# Patient Record
Sex: MALE | Race: WHITE | Employment: OTHER | URBAN - METROPOLITAN AREA
[De-identification: names, ages, dates, MRNs, and addresses within clinical notes are randomized per-mention and may not be internally consistent; named-entity substitution may affect disease eponyms.]

---

## 2018-04-09 ENCOUNTER — HOSPITAL ENCOUNTER (OUTPATIENT)
Dept: MRI IMAGING | Age: 78
Discharge: HOME | End: 2018-04-09
Attending: FAMILY MEDICINE | Admitting: FAMILY MEDICINE

## 2018-04-09 DIAGNOSIS — F19.921: ICD-10-CM

## 2018-04-09 LAB
GFR AFRICAN AMERICAN: 58 ML/MIN/1.73M2
GFR NON-AFRICAN AMERICAN: 48 ML/MIN/1.73M2
POC CREATININE: 1.4 MG/DL (ref 0.9–1.3)

## 2018-08-08 NOTE — ANESTHESIA PRE-OP
Department of Anesthesiology  Preprocedure Note       Name:  Korey Schwarz   Age:  77 y.o.  :  1940                                          MRN:  [id number]         Date:  2018      Surgeon:  Lisa Ramirez    Procedure:  Colonoscopy    Medications prior to admission:   Prior to Admission medications    Medication Sig Start Date End Date Taking? Authorizing Provider   AMITIZA 24 MCG capsule Take 1 capsule by mouth daily 9/15/15   Historical Provider, MD   carbidopa-levodopa-entacapone (STALEVO 200) -200 MG TABS per tablet Take 1 tablet by mouth 4 times daily    Historical Provider, MD   POTASSIUM CHLORIDE PO Take 1 tablet by mouth daily    Historical Provider, MD   MAGNESIUM PO Take 1 tablet by mouth daily    Historical Provider, MD   pregabalin (LYRICA) 100 MG capsule Take 100 mg by mouth 2 times daily     Historical Provider, MD   minocycline (DYNACIN) 100 MG tablet Take 100 mg by mouth daily. Historical Provider, MD   simvastatin (ZOCOR) 20 MG tablet Take 20 mg by mouth nightly. Historical Provider, MD   aspirin 81 MG tablet Take 81 mg by mouth daily. Historical Provider, MD   vitamin B-12 (CYANOCOBALAMIN) 1000 MCG tablet Take 5,000 mcg by mouth 2 times daily     Historical Provider, MD       Current medications:    Current Outpatient Prescriptions   Medication Sig Dispense Refill    AMITIZA 24 MCG capsule Take 1 capsule by mouth daily      carbidopa-levodopa-entacapone (STALEVO 200) -200 MG TABS per tablet Take 1 tablet by mouth 4 times daily      POTASSIUM CHLORIDE PO Take 1 tablet by mouth daily      MAGNESIUM PO Take 1 tablet by mouth daily      pregabalin (LYRICA) 100 MG capsule Take 100 mg by mouth 2 times daily       minocycline (DYNACIN) 100 MG tablet Take 100 mg by mouth daily.  simvastatin (ZOCOR) 20 MG tablet Take 20 mg by mouth nightly.  aspirin 81 MG tablet Take 81 mg by mouth daily.       vitamin B-12 (CYANOCOBALAMIN) 1000 MCG tablet Take 5,000 mcg by mouth 2 Polyps. Endo/Other:    (+) : arthritis:., .                  ROS comment: Scleroderma Abdominal:       Abdomen: soft. Vascular:                                      Anesthesia Plan      general and TIVA     ASA 3     (Pulse Ox on ear for readind because of bad scleraderma of the hands)  Induction: intravenous. Anesthetic plan and risks discussed with patient. Use of blood products discussed with patient whom. Plan discussed with CRNA.                 Emile Bettencourt MD   8/8/2018

## 2018-08-10 ENCOUNTER — HOSPITAL ENCOUNTER (OUTPATIENT)
Dept: SURGERY | Age: 78
Discharge: HOME | End: 2018-08-10
Attending: SPECIALIST | Admitting: SPECIALIST

## 2018-08-10 VITALS
RESPIRATION RATE: 16 BRPM | TEMPERATURE: 97.3 F | SYSTOLIC BLOOD PRESSURE: 154 MMHG | WEIGHT: 172.4 LBS | BODY MASS INDEX: 22.85 KG/M2 | HEART RATE: 67 BPM | HEIGHT: 73 IN | OXYGEN SATURATION: 100 % | DIASTOLIC BLOOD PRESSURE: 92 MMHG

## 2018-08-10 RX ORDER — RIVASTIGMINE TARTRATE 4.5 MG/1
4.5 CAPSULE ORAL 2 TIMES DAILY
COMMUNITY

## 2018-08-10 RX ORDER — SODIUM CHLORIDE, SODIUM LACTATE, POTASSIUM CHLORIDE, CALCIUM CHLORIDE 600; 310; 30; 20 MG/100ML; MG/100ML; MG/100ML; MG/100ML
INJECTION, SOLUTION INTRAVENOUS CONTINUOUS
Status: DISCONTINUED | OUTPATIENT
Start: 2018-08-10 | End: 2018-08-11 | Stop reason: HOSPADM

## 2018-08-10 RX ADMIN — SODIUM CHLORIDE, SODIUM LACTATE, POTASSIUM CHLORIDE, CALCIUM CHLORIDE: 600; 310; 30; 20 INJECTION, SOLUTION INTRAVENOUS at 10:15

## 2018-08-10 ASSESSMENT — PAIN SCALES - GENERAL
PAINLEVEL_OUTOF10: 0
PAINLEVEL_OUTOF10: 0

## 2018-08-10 ASSESSMENT — PAIN - FUNCTIONAL ASSESSMENT: PAIN_FUNCTIONAL_ASSESSMENT: 0-10

## 2018-08-10 NOTE — BRIEF OP NOTE
BRIEF COLONOSCOPY REPORT:    Impression:    1) four 3 mm polyps removed from the ascending colon    2) mild sigmoid divertics   3) internal hemorrhoids        Suggest:   1) If three or more of the resected polyps are adenomas or sessile serrated polyps then follow up colonoscopy in 3 years is suggested   2)  If only 1 or 2 are adenomas or sessile serrated polyps, then follow up in 5 years is suggested   3) If any polyp has a \"villous\" component or high-grade dysplasia, then repeat in 3 years is suggested   4) If all of the polyps are simply hyperplastic then follow up colonoscopy is not needed for 10 years. The complete operative report (including photos) is available in the following locations:   1) soft chart now   2) report will also be scanned and can then be found by going to \"chart review\" then \"notes\" then \"op report\" or by going to \"chart review\" then \"media\" then \"op report\". For review of photos, may need to go to page 2.

## 2018-08-10 NOTE — PROGRESS NOTES
1141-To Pacu, awake, denies any pain, nausea or SOB, family @ bedside, updated on plan of care, call light in reach.   Dr. Ruby Pollack in to update on procedure  1145-Repositioned to semi-fowlers, PO fluids given  1157-Tolerating fluids well, instructions reviewed with pt and family, understanding verbalized  1209-To car per w/c with written instructions,  present

## 2020-08-14 ENCOUNTER — HOSPITAL ENCOUNTER (INPATIENT)
Age: 80
LOS: 15 days | Discharge: HOME HEALTH | DRG: 371 | End: 2020-08-30
Attending: EMERGENCY MEDICINE | Admitting: INTERNAL MEDICINE
Payer: MEDICARE

## 2020-08-14 ENCOUNTER — APPOINTMENT (OUTPATIENT)
Dept: CT IMAGING | Age: 80
DRG: 371 | End: 2020-08-14
Payer: MEDICARE

## 2020-08-14 LAB
ALBUMIN SERPL-MCNC: 3.1 GM/DL (ref 3.4–5)
ALP BLD-CCNC: 98 IU/L (ref 40–129)
ALT SERPL-CCNC: <5 U/L (ref 10–40)
ANION GAP SERPL CALCULATED.3IONS-SCNC: 12 MMOL/L (ref 4–16)
AST SERPL-CCNC: 11 IU/L (ref 15–37)
BASOPHILS ABSOLUTE: 0 K/CU MM
BASOPHILS RELATIVE PERCENT: 0.2 % (ref 0–1)
BILIRUB SERPL-MCNC: 0.4 MG/DL (ref 0–1)
BUN BLDV-MCNC: 21 MG/DL (ref 6–23)
CALCIUM SERPL-MCNC: 9.6 MG/DL (ref 8.3–10.6)
CHLORIDE BLD-SCNC: 98 MMOL/L (ref 99–110)
CO2: 23 MMOL/L (ref 21–32)
CREAT SERPL-MCNC: 1.7 MG/DL (ref 0.9–1.3)
DIFFERENTIAL TYPE: ABNORMAL
EOSINOPHILS ABSOLUTE: 0 K/CU MM
EOSINOPHILS RELATIVE PERCENT: 0.1 % (ref 0–3)
GFR AFRICAN AMERICAN: 47 ML/MIN/1.73M2
GFR NON-AFRICAN AMERICAN: 39 ML/MIN/1.73M2
GLUCOSE BLD-MCNC: 104 MG/DL (ref 70–99)
HCT VFR BLD CALC: 40.2 % (ref 42–52)
HEMOGLOBIN: 12.7 GM/DL (ref 13.5–18)
IMMATURE NEUTROPHIL %: 0.5 % (ref 0–0.43)
LACTATE: 1.3 MMOL/L (ref 0.4–2)
LIPASE: 34 IU/L (ref 13–60)
LYMPHOCYTES ABSOLUTE: 0.5 K/CU MM
LYMPHOCYTES RELATIVE PERCENT: 3.9 % (ref 24–44)
MCH RBC QN AUTO: 30.3 PG (ref 27–31)
MCHC RBC AUTO-ENTMCNC: 31.6 % (ref 32–36)
MCV RBC AUTO: 95.9 FL (ref 78–100)
MONOCYTES ABSOLUTE: 1 K/CU MM
MONOCYTES RELATIVE PERCENT: 8.1 % (ref 0–4)
NUCLEATED RBC %: 0 %
PDW BLD-RTO: 13.5 % (ref 11.7–14.9)
PLATELET # BLD: 233 K/CU MM (ref 140–440)
PMV BLD AUTO: 9.4 FL (ref 7.5–11.1)
POTASSIUM SERPL-SCNC: 4.3 MMOL/L (ref 3.5–5.1)
RBC # BLD: 4.19 M/CU MM (ref 4.6–6.2)
SEGMENTED NEUTROPHILS ABSOLUTE COUNT: 11.2 K/CU MM
SEGMENTED NEUTROPHILS RELATIVE PERCENT: 87.2 % (ref 36–66)
SODIUM BLD-SCNC: 133 MMOL/L (ref 135–145)
TOTAL IMMATURE NEUTOROPHIL: 0.07 K/CU MM
TOTAL NUCLEATED RBC: 0 K/CU MM
TOTAL PROTEIN: 6.8 GM/DL (ref 6.4–8.2)
WBC # BLD: 12.8 K/CU MM (ref 4–10.5)

## 2020-08-14 PROCEDURE — 85025 COMPLETE CBC W/AUTO DIFF WBC: CPT

## 2020-08-14 PROCEDURE — 84154 ASSAY OF PSA FREE: CPT

## 2020-08-14 PROCEDURE — 4500000027

## 2020-08-14 PROCEDURE — 74176 CT ABD & PELVIS W/O CONTRAST: CPT

## 2020-08-14 PROCEDURE — 83690 ASSAY OF LIPASE: CPT

## 2020-08-14 PROCEDURE — 84153 ASSAY OF PSA TOTAL: CPT

## 2020-08-14 PROCEDURE — 80053 COMPREHEN METABOLIC PANEL: CPT

## 2020-08-14 PROCEDURE — 36415 COLL VENOUS BLD VENIPUNCTURE: CPT

## 2020-08-14 PROCEDURE — 83605 ASSAY OF LACTIC ACID: CPT

## 2020-08-14 PROCEDURE — 99285 EMERGENCY DEPT VISIT HI MDM: CPT

## 2020-08-14 PROCEDURE — 2580000003 HC RX 258: Performed by: EMERGENCY MEDICINE

## 2020-08-14 PROCEDURE — 6360000002 HC RX W HCPCS

## 2020-08-14 RX ORDER — 0.9 % SODIUM CHLORIDE 0.9 %
1000 INTRAVENOUS SOLUTION INTRAVENOUS ONCE
Status: COMPLETED | OUTPATIENT
Start: 2020-08-14 | End: 2020-08-14

## 2020-08-14 RX ORDER — ONDANSETRON 2 MG/ML
4 INJECTION INTRAMUSCULAR; INTRAVENOUS EVERY 30 MIN PRN
Status: DISCONTINUED | OUTPATIENT
Start: 2020-08-14 | End: 2020-08-15

## 2020-08-14 RX ADMIN — SODIUM CHLORIDE 1000 ML: 9 INJECTION, SOLUTION INTRAVENOUS at 21:30

## 2020-08-14 NOTE — ED PROVIDER NOTES
As physician-in-triage, I performed a medical screening history and physical exam on this patient. HISTORY OF PRESENT ILLNESS  Mario Dewitt is a 79 y.o. male presents with nausea, vomiting and abdominal pain. No bowel movement for a couple of days. Also having difficulty urinating. Was sent to the ER for possible urinary tract infection versus bowel obstruction. PHYSICAL EXAM  BP (!) 148/86   Pulse 80   Temp 98.9 °F (37.2 °C) (Oral)   Resp 16   Ht 6' 1\" (1.854 m)   Wt 161 lb (73 kg)   SpO2 97%   BMI 21.24 kg/m²     On exam, the patient appears in no acute distress. Speech is clear. Breathing is unlabored. Moves all extremities    Comment: Please note this report has been produced using speech recognition software and may contain errors related to that system including errors in grammar, punctuation, and spelling, as well as words and phrases that may be inappropriate. If there are any questions or concerns please feel free to contact the dictating provider for clarification.         Harjeet Thomas MD  08/14/20 4103

## 2020-08-15 ENCOUNTER — APPOINTMENT (OUTPATIENT)
Dept: CT IMAGING | Age: 80
DRG: 371 | End: 2020-08-15
Payer: MEDICARE

## 2020-08-15 PROBLEM — R19.00 PELVIC MASS: Status: ACTIVE | Noted: 2020-08-15

## 2020-08-15 LAB
AMMONIA: 22 UMOL/L (ref 16–60)
ANION GAP SERPL CALCULATED.3IONS-SCNC: 10 MMOL/L (ref 4–16)
BACTERIA: NEGATIVE /HPF
BACTERIA: NEGATIVE /HPF
BASOPHILS ABSOLUTE: 0 K/CU MM
BASOPHILS RELATIVE PERCENT: 0.2 % (ref 0–1)
BILIRUBIN URINE: NEGATIVE MG/DL
BILIRUBIN URINE: NEGATIVE MG/DL
BLOOD, URINE: ABNORMAL
BLOOD, URINE: ABNORMAL
BUN BLDV-MCNC: 23 MG/DL (ref 6–23)
CALCIUM SERPL-MCNC: 9.3 MG/DL (ref 8.3–10.6)
CHLORIDE BLD-SCNC: 101 MMOL/L (ref 99–110)
CHLORIDE URINE RANDOM: 115 MMOL/L (ref 43–210)
CLARITY: ABNORMAL
CLARITY: CLEAR
CO2: 24 MMOL/L (ref 21–32)
COLOR: ABNORMAL
COLOR: YELLOW
CREAT SERPL-MCNC: 2 MG/DL (ref 0.9–1.3)
CREATININE URINE: 85.6 MG/DL (ref 39–259)
DIFFERENTIAL TYPE: ABNORMAL
EOSINOPHILS ABSOLUTE: 0 K/CU MM
EOSINOPHILS RELATIVE PERCENT: 0.1 % (ref 0–3)
GFR AFRICAN AMERICAN: 39 ML/MIN/1.73M2
GFR NON-AFRICAN AMERICAN: 32 ML/MIN/1.73M2
GLUCOSE BLD-MCNC: 88 MG/DL (ref 70–99)
GLUCOSE, URINE: NEGATIVE MG/DL
GLUCOSE, URINE: NEGATIVE MG/DL
HCT VFR BLD CALC: 38.9 % (ref 42–52)
HEMOGLOBIN: 12.4 GM/DL (ref 13.5–18)
IMMATURE NEUTROPHIL %: 0.5 % (ref 0–0.43)
INR BLD: 1.11 INDEX
KETONES, URINE: ABNORMAL MG/DL
KETONES, URINE: ABNORMAL MG/DL
LEUKOCYTE ESTERASE, URINE: NEGATIVE
LEUKOCYTE ESTERASE, URINE: NEGATIVE
LYMPHOCYTES ABSOLUTE: 0.4 K/CU MM
LYMPHOCYTES RELATIVE PERCENT: 3.7 % (ref 24–44)
MCH RBC QN AUTO: 30.2 PG (ref 27–31)
MCHC RBC AUTO-ENTMCNC: 31.9 % (ref 32–36)
MCV RBC AUTO: 94.9 FL (ref 78–100)
MONOCYTES ABSOLUTE: 1 K/CU MM
MONOCYTES RELATIVE PERCENT: 8.3 % (ref 0–4)
MUCUS: ABNORMAL HPF
MUCUS: ABNORMAL HPF
NITRITE URINE, QUANTITATIVE: NEGATIVE
NITRITE URINE, QUANTITATIVE: NEGATIVE
NUCLEATED RBC %: 0 %
PDW BLD-RTO: 13.4 % (ref 11.7–14.9)
PH, URINE: 5 (ref 5–8)
PH, URINE: 6 (ref 5–8)
PLATELET # BLD: 229 K/CU MM (ref 140–440)
PMV BLD AUTO: 10 FL (ref 7.5–11.1)
POTASSIUM SERPL-SCNC: 4.3 MMOL/L (ref 3.5–5.1)
POTASSIUM, UR: 26.9 MMOL/L (ref 22–119)
PROSTATE SPECIFIC ANTIGEN: 2.76 NG/ML (ref 0–4)
PROT/CREAT RATIO, UR: 0.7
PROTEIN UA: 100 MG/DL
PROTEIN UA: 30 MG/DL
PROTHROMBIN TIME: 13.4 SECONDS (ref 11.7–14.5)
RBC # BLD: 4.1 M/CU MM (ref 4.6–6.2)
RBC URINE: 4 /HPF (ref 0–3)
RBC URINE: 6 /HPF (ref 0–3)
SEGMENTED NEUTROPHILS ABSOLUTE COUNT: 10.4 K/CU MM
SEGMENTED NEUTROPHILS RELATIVE PERCENT: 87.2 % (ref 36–66)
SODIUM BLD-SCNC: 135 MMOL/L (ref 135–145)
SODIUM URINE: 122 MMOL/L (ref 35–167)
SPECIFIC GRAVITY UA: 1.01 (ref 1–1.03)
SPECIFIC GRAVITY UA: 1.02 (ref 1–1.03)
TOTAL IMMATURE NEUTOROPHIL: 0.06 K/CU MM
TOTAL NUCLEATED RBC: 0 K/CU MM
TRANSITIONAL EPITHELIAL: <1 /HPF
TRICHOMONAS: ABNORMAL /HPF
TRICHOMONAS: ABNORMAL /HPF
URINE TOTAL PROTEIN: 63.2 MG/DL
UROBILINOGEN, URINE: NORMAL MG/DL (ref 0.2–1)
UROBILINOGEN, URINE: NORMAL MG/DL (ref 0.2–1)
WBC # BLD: 12 K/CU MM (ref 4–10.5)
WBC UA: 1 /HPF (ref 0–2)
WBC UA: 4 /HPF (ref 0–2)

## 2020-08-15 PROCEDURE — 6360000002 HC RX W HCPCS: Performed by: NURSE PRACTITIONER

## 2020-08-15 PROCEDURE — 81001 URINALYSIS AUTO W/SCOPE: CPT

## 2020-08-15 PROCEDURE — 92610 EVALUATE SWALLOWING FUNCTION: CPT

## 2020-08-15 PROCEDURE — 6360000002 HC RX W HCPCS: Performed by: INTERNAL MEDICINE

## 2020-08-15 PROCEDURE — 36415 COLL VENOUS BLD VENIPUNCTURE: CPT

## 2020-08-15 PROCEDURE — 2700000000 HC OXYGEN THERAPY PER DAY

## 2020-08-15 PROCEDURE — 6370000000 HC RX 637 (ALT 250 FOR IP): Performed by: INTERNAL MEDICINE

## 2020-08-15 PROCEDURE — 82570 ASSAY OF URINE CREATININE: CPT

## 2020-08-15 PROCEDURE — 2580000003 HC RX 258: Performed by: HOSPITALIST

## 2020-08-15 PROCEDURE — 87086 URINE CULTURE/COLONY COUNT: CPT

## 2020-08-15 PROCEDURE — 94761 N-INVAS EAR/PLS OXIMETRY MLT: CPT

## 2020-08-15 PROCEDURE — 2580000003 HC RX 258: Performed by: INTERNAL MEDICINE

## 2020-08-15 PROCEDURE — 84156 ASSAY OF PROTEIN URINE: CPT

## 2020-08-15 PROCEDURE — 85025 COMPLETE CBC W/AUTO DIFF WBC: CPT

## 2020-08-15 PROCEDURE — 85610 PROTHROMBIN TIME: CPT

## 2020-08-15 PROCEDURE — G0103 PSA SCREENING: HCPCS

## 2020-08-15 PROCEDURE — 84133 ASSAY OF URINE POTASSIUM: CPT

## 2020-08-15 PROCEDURE — 84300 ASSAY OF URINE SODIUM: CPT

## 2020-08-15 PROCEDURE — 51702 INSERT TEMP BLADDER CATH: CPT

## 2020-08-15 PROCEDURE — 6360000002 HC RX W HCPCS: Performed by: HOSPITALIST

## 2020-08-15 PROCEDURE — 1200000000 HC SEMI PRIVATE

## 2020-08-15 PROCEDURE — 82140 ASSAY OF AMMONIA: CPT

## 2020-08-15 PROCEDURE — 70450 CT HEAD/BRAIN W/O DYE: CPT

## 2020-08-15 PROCEDURE — 80048 BASIC METABOLIC PNL TOTAL CA: CPT

## 2020-08-15 PROCEDURE — 99221 1ST HOSP IP/OBS SF/LOW 40: CPT | Performed by: SURGERY

## 2020-08-15 PROCEDURE — 82436 ASSAY OF URINE CHLORIDE: CPT

## 2020-08-15 RX ORDER — CARBIDOPA, LEVODOPA AND ENTACAPONE 50; 200; 200 MG/1; MG/1; MG/1
1 TABLET, FILM COATED ORAL 4 TIMES DAILY
Status: DISCONTINUED | OUTPATIENT
Start: 2020-08-15 | End: 2020-08-15 | Stop reason: CLARIF

## 2020-08-15 RX ORDER — ROSUVASTATIN CALCIUM 40 MG/1
40 TABLET, COATED ORAL EVERY MORNING
Status: DISCONTINUED | OUTPATIENT
Start: 2020-08-15 | End: 2020-08-30 | Stop reason: HOSPADM

## 2020-08-15 RX ORDER — PROMETHAZINE HYDROCHLORIDE 12.5 MG/1
12.5 TABLET ORAL EVERY 6 HOURS PRN
Status: DISCONTINUED | OUTPATIENT
Start: 2020-08-15 | End: 2020-08-30 | Stop reason: HOSPADM

## 2020-08-15 RX ORDER — POLYETHYLENE GLYCOL 3350 17 G/17G
17 POWDER, FOR SOLUTION ORAL DAILY PRN
Status: DISCONTINUED | OUTPATIENT
Start: 2020-08-15 | End: 2020-08-30 | Stop reason: HOSPADM

## 2020-08-15 RX ORDER — ESCITALOPRAM OXALATE 10 MG/1
10 TABLET ORAL NIGHTLY
Status: DISCONTINUED | OUTPATIENT
Start: 2020-08-15 | End: 2020-08-30 | Stop reason: HOSPADM

## 2020-08-15 RX ORDER — SODIUM CHLORIDE 0.9 % (FLUSH) 0.9 %
10 SYRINGE (ML) INJECTION PRN
Status: DISCONTINUED | OUTPATIENT
Start: 2020-08-15 | End: 2020-08-30 | Stop reason: HOSPADM

## 2020-08-15 RX ORDER — ONDANSETRON 2 MG/ML
4 INJECTION INTRAMUSCULAR; INTRAVENOUS EVERY 6 HOURS PRN
Status: DISCONTINUED | OUTPATIENT
Start: 2020-08-15 | End: 2020-08-30 | Stop reason: HOSPADM

## 2020-08-15 RX ORDER — ASPIRIN 81 MG/1
81 TABLET, CHEWABLE ORAL DAILY
Status: DISCONTINUED | OUTPATIENT
Start: 2020-08-15 | End: 2020-08-30

## 2020-08-15 RX ORDER — ACETAMINOPHEN 650 MG/1
650 SUPPOSITORY RECTAL EVERY 6 HOURS PRN
Status: DISCONTINUED | OUTPATIENT
Start: 2020-08-15 | End: 2020-08-30 | Stop reason: HOSPADM

## 2020-08-15 RX ORDER — BICALUTAMIDE 50 MG/1
50 TABLET, FILM COATED ORAL DAILY
COMMUNITY

## 2020-08-15 RX ORDER — ENTACAPONE 200 MG/1
200 TABLET ORAL 4 TIMES DAILY
Status: DISCONTINUED | OUTPATIENT
Start: 2020-08-15 | End: 2020-08-30 | Stop reason: HOSPADM

## 2020-08-15 RX ORDER — AMANTADINE HYDROCHLORIDE 100 MG/1
100 CAPSULE, GELATIN COATED ORAL 2 TIMES DAILY
Status: DISCONTINUED | OUTPATIENT
Start: 2020-08-15 | End: 2020-08-30 | Stop reason: HOSPADM

## 2020-08-15 RX ORDER — SODIUM CHLORIDE 0.9 % (FLUSH) 0.9 %
10 SYRINGE (ML) INJECTION EVERY 12 HOURS SCHEDULED
Status: DISCONTINUED | OUTPATIENT
Start: 2020-08-15 | End: 2020-08-30 | Stop reason: HOSPADM

## 2020-08-15 RX ORDER — RIVASTIGMINE TARTRATE 1.5 MG/1
1.5 CAPSULE ORAL 2 TIMES DAILY
Status: DISCONTINUED | OUTPATIENT
Start: 2020-08-15 | End: 2020-08-30 | Stop reason: HOSPADM

## 2020-08-15 RX ORDER — MORPHINE SULFATE 2 MG/ML
2 INJECTION, SOLUTION INTRAMUSCULAR; INTRAVENOUS EVERY 4 HOURS PRN
Status: DISCONTINUED | OUTPATIENT
Start: 2020-08-15 | End: 2020-08-30 | Stop reason: HOSPADM

## 2020-08-15 RX ORDER — BICALUTAMIDE 50 MG/1
50 TABLET, FILM COATED ORAL DAILY
Status: DISCONTINUED | OUTPATIENT
Start: 2020-08-15 | End: 2020-08-30 | Stop reason: HOSPADM

## 2020-08-15 RX ORDER — ACETAMINOPHEN 325 MG/1
650 TABLET ORAL EVERY 6 HOURS PRN
Status: DISCONTINUED | OUTPATIENT
Start: 2020-08-15 | End: 2020-08-30 | Stop reason: HOSPADM

## 2020-08-15 RX ORDER — SODIUM CHLORIDE 9 MG/ML
INJECTION, SOLUTION INTRAVENOUS CONTINUOUS
Status: DISCONTINUED | OUTPATIENT
Start: 2020-08-15 | End: 2020-08-21

## 2020-08-15 RX ADMIN — ACETAMINOPHEN 650 MG: 325 TABLET ORAL at 16:00

## 2020-08-15 RX ADMIN — CARBIDOPA AND LEVODOPA 2 TABLET: 25; 100 TABLET ORAL at 21:06

## 2020-08-15 RX ADMIN — CARBIDOPA AND LEVODOPA 2 TABLET: 25; 100 TABLET ORAL at 10:07

## 2020-08-15 RX ADMIN — RIVASTIGMINE TARTRATE 1.5 MG: 1.5 CAPSULE ORAL at 21:09

## 2020-08-15 RX ADMIN — ENTACAPONE 200 MG: 200 TABLET, FILM COATED ORAL at 13:04

## 2020-08-15 RX ADMIN — SODIUM CHLORIDE: 9 INJECTION, SOLUTION INTRAVENOUS at 04:44

## 2020-08-15 RX ADMIN — CARBIDOPA AND LEVODOPA 2 TABLET: 25; 100 TABLET ORAL at 13:02

## 2020-08-15 RX ADMIN — RIVASTIGMINE TARTRATE 1.5 MG: 1.5 CAPSULE ORAL at 09:55

## 2020-08-15 RX ADMIN — ENTACAPONE 200 MG: 200 TABLET, FILM COATED ORAL at 17:07

## 2020-08-15 RX ADMIN — SODIUM CHLORIDE, PRESERVATIVE FREE 10 ML: 5 INJECTION INTRAVENOUS at 09:56

## 2020-08-15 RX ADMIN — ENTACAPONE 200 MG: 200 TABLET, FILM COATED ORAL at 09:55

## 2020-08-15 RX ADMIN — ESCITALOPRAM OXALATE 10 MG: 10 TABLET ORAL at 21:09

## 2020-08-15 RX ADMIN — ROSUVASTATIN 40 MG: 40 TABLET, FILM COATED ORAL at 10:07

## 2020-08-15 RX ADMIN — ENTACAPONE 200 MG: 200 TABLET, FILM COATED ORAL at 21:09

## 2020-08-15 RX ADMIN — AZITHROMYCIN MONOHYDRATE 500 MG: 500 INJECTION, POWDER, LYOPHILIZED, FOR SOLUTION INTRAVENOUS at 20:14

## 2020-08-15 RX ADMIN — AMANTADINE HYDROCHLORIDE 100 MG: 100 CAPSULE, LIQUID FILLED ORAL at 21:06

## 2020-08-15 RX ADMIN — AMANTADINE HYDROCHLORIDE 100 MG: 100 CAPSULE, LIQUID FILLED ORAL at 10:07

## 2020-08-15 RX ADMIN — ENOXAPARIN SODIUM 30 MG: 30 INJECTION SUBCUTANEOUS at 09:54

## 2020-08-15 RX ADMIN — CEFTRIAXONE 1 G: 1 INJECTION, POWDER, FOR SOLUTION INTRAMUSCULAR; INTRAVENOUS at 16:13

## 2020-08-15 RX ADMIN — ACETAMINOPHEN 650 MG: 650 SUPPOSITORY RECTAL at 23:11

## 2020-08-15 RX ADMIN — CARBIDOPA AND LEVODOPA 2 TABLET: 25; 100 TABLET ORAL at 17:07

## 2020-08-15 RX ADMIN — ASPIRIN 81 MG CHEWABLE TABLET 81 MG: 81 TABLET CHEWABLE at 10:07

## 2020-08-15 RX ADMIN — BICALUTAMIDE 50 MG: 50 TABLET ORAL at 09:55

## 2020-08-15 RX ADMIN — MORPHINE SULFATE 2 MG: 2 INJECTION, SOLUTION INTRAMUSCULAR; INTRAVENOUS at 05:20

## 2020-08-15 RX ADMIN — SODIUM CHLORIDE: 9 INJECTION, SOLUTION INTRAVENOUS at 23:11

## 2020-08-15 ASSESSMENT — PAIN DESCRIPTION - FREQUENCY: FREQUENCY: CONTINUOUS

## 2020-08-15 ASSESSMENT — PAIN SCALES - GENERAL
PAINLEVEL_OUTOF10: 0
PAINLEVEL_OUTOF10: 9
PAINLEVEL_OUTOF10: 9

## 2020-08-15 ASSESSMENT — PAIN DESCRIPTION - PAIN TYPE: TYPE: ACUTE PAIN

## 2020-08-15 ASSESSMENT — PAIN DESCRIPTION - DESCRIPTORS: DESCRIPTORS: CRAMPING;SPASM

## 2020-08-15 ASSESSMENT — PAIN DESCRIPTION - LOCATION: LOCATION: LEG

## 2020-08-15 NOTE — PROGRESS NOTES
PS sent to Dr. Lisa Wilkins \"Pt wife, concerned with how lethargic pt is. Are you able to come assess pt/update wife or call room phone [phone]. Thanks! \"  RINA PinedoN, RN

## 2020-08-15 NOTE — PROGRESS NOTES
mass  Resolved Problems:    * No resolved hospital problems. *    Blood pressure (!) 161/85, pulse 89, temperature 98.6 °F (37 °C), temperature source Oral, resp. rate 17, height 6' 1\" (1.854 m), weight 161 lb (73 kg), SpO2 98 %. Subjective:  Diet:  Poor intake. Pain:  He complains of pain that is mild. Objective:  General Appearance:  Comfortable. Vital signs: (most recent): Blood pressure (!) 161/85, pulse 89, temperature 98.6 °F (37 °C), temperature source Oral, resp. rate 17, height 6' 1\" (1.854 m), weight 161 lb (73 kg), SpO2 98 %. Vital signs are normal.    HEENT: Normal HEENT exam.  (Tanned face compared to torso)    Lungs:  Normal effort. Heart: Normal rate. Abdomen: Abdomen is soft. (No peritoneal signs. BS+). Extremities: Decreased range of motion. Neurological: Patient is alert. Pupils:  Pupils are equal, round, and reactive to light. Skin:  Warm.       Assessment & Plan  abd apin due to pelvic mass with right sided hydronephrosis  -surg and urology eval  -Ct with right pelvic mass   SLIM  - IVF  -has hydronephrosis and consult nephro  Mod PCM  Prostate cancer /sp prostatectomy   -bicalutamide  Dementia parkisons  -excelon, amantidine, sinemet, entapacone  DVT prophyalxis  -lovenox    Lisa Guthrie MD  8/15/2020

## 2020-08-15 NOTE — H&P
- Renee catheter is not present. MS  -B/L extremities strong muscles strength. Full movements. No gross joint deformities. No swelling, intact sensation symmetrical.   SKIN  -Normal coloration, warm, dry. No open wounds or ulcers. NEURO  -normal speech, no lateralizing weakness. [facility]  -Awake, alert, oriented x 3    Past Medical History:      Past Medical History:   Diagnosis Date    Arthritis     bilateral hands    Cancer ([address]     prostate    Hyperlipidemia     Scleroderma ([address]     face skin is black in coloration     Past Surgery History:  Patient  has a past surgical history that includes Prostatectomy (2011); Endoscopy, colon, diagnostic (10/6/2015); Colonoscopy (3/8/13); Colonoscopy (10/6/2015); and Colonoscopy (08/10/2018).   Social History:    FAM HX: Assessed: Noncontributory  Soc HX:   Social History     Socioeconomic History    Marital status:      Spouse name: None    Number of children: None    Years of education: None    Highest education level: None   Occupational History    None   Social Needs    Financial resource strain: None    Food insecurity     Worry: None     Inability: None    Transportation needs     Medical: None     Non-medical: None   Tobacco Use    Smoking status: Never Smoker    Smokeless tobacco: Never Used   Substance and Sexual Activity    Alcohol use: Yes     Comment: rarely    Drug use: No    Sexual activity: None   Lifestyle    Physical activity     Days per week: None     Minutes per session: None    Stress: None   Relationships    Social connections     Talks on phone: None     Gets together: None     Attends Voodoo service: None     Active member of club or organization: None     Attends meetings of clubs or organizations: None     Relationship status: None    Intimate partner violence     Fear of current or ex partner: None     Emotionally abused: None     Physically abused: None     Forced sexual activity: None   Other Topics Concern    None TROPONIN1  Invalid input(s): PRO-BNP    Radiology this visit:  Reviewed. Ct Abdomen Pelvis Wo Contrast Additional Contrast? None    Result Date: 8/14/2020  EXAMINATION: CT OF THE ABDOMEN AND PELVIS WITHOUT CONTRAST 8/14/2020 9:52 pm TECHNIQUE: CT of the abdomen and pelvis was performed without the administration of intravenous contrast. Multiplanar reformatted images are provided for review. Dose modulation, iterative reconstruction, and/or weight based adjustment of the mA/kV was utilized to reduce the radiation dose to as low as reasonably achievable. COMPARISON: None. HISTORY: ORDERING SYSTEM PROVIDED HISTORY: nausea, vomiting, abdominal pain TECHNOLOGIST PROVIDED HISTORY: Reason for exam:->nausea, vomiting, abdominal pain Additional Contrast?->None Reason for Exam: nausea, vomiting, abdominal pain Acuity: Acute Type of Exam: Initial FINDINGS: Lower Chest: There is dependent consolidation in the lungs. Organs: There is mild right hydronephrosis. There is no obstructing stone. No acute abnormality of the liver, spleen, pancreas, adrenals, gallbladder, or left kidney. GI/Bowel: Bowel caliber is normal.  There is no evidence of active bowel inflammation. There is no evidence of acute appendicitis. Pelvis: In the right pelvic sidewall is a 56 x 67 x 66 mm (approximately) ill-defined mass adjacent to the iliac vessels. Borders of the mass are ill-defined. It medially displaces the right ureter. The mass appears somewhat elongated along the course of the iliac vein. The mass has an average density of 35 Hounsfield units. The urinary bladder is unremarkable. There are clips from prostatectomy. Peritoneum/Retroperitoneum: See above. No free air or free fluid. Bones/Soft Tissues: No acute osseous abnormality. Right pelvic mass. Differential diagnosis includes subacute hematoma or infiltrative malignancy. Mild right hydronephrosis due to compression of the ureter by the mass.  Bibasilar pulmonary consolidation or atelectasis. EKG this visit:   EKG: None.     Current Treatment Team:  Treatment Team: Attending Provider: Oh Gregg MD; Consulting Physician: MD Francisco Arango Md, MS [facility]  8/15/2020 12:36 AM      Electronically signed by Francisco Montiel MD on 8/15/2020 at 12:36 AM

## 2020-08-15 NOTE — CONSULTS
Nephrology Service Consultation    Patient:  Niko Roberts  MRN: [id number]  Consulting physician:  Wei Prasad MD  Reason for Consult: arf in setting new mass and hydronephrosis    History Obtained From:  patient, electronic medical record  PCP: Xiomara Irving MD    HISTORY OF PRESENT ILLNESS:   The patient is a 79 y.o. male who presents with weakness and state home with family. Pt with prostate ca nd surgery 2010, parkinson disease, scleroderma , malnourished and poor historian. Present with abd pain and no BM with low uop. Ct scan in er done with right pelvic mass and concern malignancy but also was compressing ureter leading to hydro and now noted arf in above setting. Renal asked evaluate. Past Medical History:        Diagnosis Date    Arthritis     bilateral hands    Cancer ([address]     prostate    Hyperlipidemia     [address]     face skin is black in coloration       Past Surgical History:        Procedure Laterality Date    COLONOSCOPY  3/8/13    Insuffieient prep    COLONOSCOPY  10/6/2015    diverticulosis, polyps x8, ext hem,    COLONOSCOPY  08/10/2018    Polyps x5, Int hemorrhoids, diverticulosis    ENDOSCOPY, COLON, DIAGNOSTIC  10/6/2015    mild erosive esophagitis    PROSTATECTOMY  2011       Medications:   Scheduled Meds:   amantadine  100 mg Oral BID    aspirin  81 mg Oral Daily    escitalopram  10 mg Oral Nightly    rivastigmine  1.5 mg Oral BID    rosuvastatin  40 mg Oral QAM    bicalutamide  50 mg Oral Daily    sodium chloride flush  10 mL Intravenous 2 times per day    enoxaparin  30 mg Subcutaneous Daily    carbidopa-levodopa  2 tablet Oral 4x Daily    And    entacapone  200 mg Oral 4x Daily     Continuous Infusions:   sodium chloride 100 mL/hr at 08/15/20 0444     PRN Meds:.sodium chloride flush, acetaminophen **OR** acetaminophen, polyethylene glycol, promethazine **OR** ondansetron, morphine    Allergies:  Patient has no known allergies.     Social History:   TOBACCO:   reports that he has never smoked. He has never used smokeless tobacco.  ETOH:   reports current alcohol use. OCCUPATION:      Family History:   History reviewed. No pertinent family history. REVIEW OF SYSTEMS:  Negative except for weak soft spoken poor historian weak with constipation. Physical Exam:    Vitals: BP (!) 161/85   Pulse 89   Temp 98.6 °F (37 °C) (Oral)   Resp 17   Ht 6' 1\" (1.854 m)   Wt 161 lb (73 kg)   SpO2 98%   BMI 21.24 kg/m²   General appearance: awake weak  HEENT: Head: Normal, normocephalic, atraumatic. Neck: supple, symmetrical, trachea midline  Lungs: diminished breath sounds bilaterally  Heart: S1, S2 normal  Abdomen: abnormal findings:  hypoactive bowel sounds  Extremities: edema  none  Neurologic: Mental status: alertness: awake    CBC:   Recent Labs     08/14/20 1940   WBC 12.8*   HGB 12.7*        BMP:    Recent Labs     08/14/20  1940 08/15/20  0505   * 135   K 4.3 4.3   CL 98* 101   CO2 23 24   BUN 21 23   CREATININE 1.7* 2.0*   GLUCOSE 104* 88     Hepatic:   Recent Labs     08/14/20 1940   AST 11*   ALT <5*   BILITOT 0.4   ALKPHOS 98     Troponin: No results for input(s): TROPONINI in the last 72 hours. Mg, Phos: No results for input(s): MG, PHOS in the last 72 hours. ABGs: No results found for: PHART, PO2ART, JFN2WAY  INR: No results for input(s): INR in the last 72 hours.   -----------------------------------------------------------------      Assessment and Recommendations     Patient Active Problem List   Diagnosis Code    Weight loss, unintentional R63.4    History of colon polyps Z86.010    Pelvic mass R19.00     Imp/plan  1 arf from atn/pra/hydro  2 right side mass possible malignancy  3 hx prostate ca  4 hyponatremia  5 protein malnutrtion with constipation  6 hx scleroderma    Plan  1 creat 1.12 10/2019 and now with arf and try hydrate and fu urology plan for hydropnephrosis  2 fu oncology eval and rec and may need bx if truly cancer  3 prior surgery fu psa  4 na better  5 start nepro and give laxative  6 check sso and not felt renal related to scleroderma, fu ua as well  Will follow    Electronically signed by Tristen Cali MD on 8/15/2020 at 11:56 AM

## 2020-08-15 NOTE — PROGRESS NOTES
Talked to wife and he has been having issues eating and concern for aspiraiton. Add abx and speech eval. Check CBC.

## 2020-08-15 NOTE — CONSULTS
[facility] SweetetsuyckersJohn[facility] Hospital[facility], [address]   Consult Note  [facility] 1 2 3 4 5    Date: 8/15/2020   Patient: Xiomara Villela   : 1940   DOA: 2020   MRN: [id number]   ROOM#: [id number]     Reason for Consult: Pelvic mass, rt hydronephrosis   Requesting Physician:  Dr. Makayla Mckenna  Collaborating Urologist on Call at time of admission: Dr. Dean Mcgregor:  Abdominal pain    History Obtained From:  patient, electronic medical record    HISTORY OF PRESENT ILLNESS:                The patient is a 79 y.o. male with significant past medical history of prostate cancer s/p RALP 2010, Parkinson's Dz, arthritis, scleroderma, and HLD who presented with abdominal pain, constipation, and decreased urination. Pt states his abdominal pain andconstipation have been ongoing for 2-3 months. Denies gross hematuria, dysuria, or other urinary sx. Pt last seen in our office 2020 by Dr. Oleg Mclaughlin. At that time he had been having ~3 months of significant weakness and having issues with urge incontinence (partially associated with decreased mobility). He is on Eligard. DEWEY in 2017 revealed a band of scar tissue d/t prior RALP but no overt masses    ED Provider Note 20: Xiomara Villela is a 79 y.o. male who presents to the Emergency Department for evaluation of abdominal pain, constipation and decreased urination. The patient states that he has been having intermittent lower abdominal pain. It is crampy and achy in nature. It has been coming and going. There are no exacerbating or relieving factors. He has been constipated and has not had a bowel movement for the past 2 days. He has only urinated once today, but denies the urge to urinate despite his wife giving him fluids. He was seen at his primary care physician's office today who sent him into the emergency department for evaluation of possible bowel obstruction. He denies any nausea or vomiting.  The patient denies fevers, chills, hematemesis, bloody stools, flank pain, tobacco.  ETOH:   reports current alcohol use. DRUGS:   reports no history of drug use. Family History:   History reviewed. No pertinent family history. REVIEW OF SYSTEMS:     CONSTITUTIONAL:  positive for  fatigue  RESPIRATORY:  negative  CARDIOVASCULAR:  negative  GASTROINTESTINAL:  positive for constipation and abdominal pain  GENITOURINARY:  positive for decreased urine output    PHYSICAL EXAM:      VITALS:  BP (!) 193/91   Pulse 82   Temp 97.8 °F (36.6 °C) (Oral)   Resp 16   Ht 6' 1\" (1.854 m)   Wt 161 lb (73 kg)   SpO2 96%   BMI 21.24 kg/m²      TEMPERATURE:  Current - Temp: 97.8 °F (36.6 °C); Max - Temp  Av.4 °F (36.9 °C)  Min: 97.8 °F (36.6 °C)  Max: 98.9 °F (37.2 °C)  24HR BLOOD PRESSURE RANGE:  Systolic (78JLC), LGO:829 , Min:127 , GET:885   ; Diastolic (09JMF), TJF:21, Min:81, Max:92      General appearance: alert, appears stated age, cooperative, fatigued and no distress  Head: Normocephalic, without obvious abnormality, atraumatic  Back: Mild left CVA tenderness  Abdomen: Soft, non-distended, TTP in RLQ    DATA:    WBC:    Lab Results   Component Value Date    WBC 12.8 2020     Hemoglobin/Hematocrit:    Lab Results   Component Value Date    HGB 12.7 2020    HCT 40.2 2020     BMP:    Lab Results   Component Value Date     08/15/2020    K 4.3 08/15/2020     08/15/2020    CO2 24 08/15/2020    BUN 23 08/15/2020    LABALBU 3.1 2020    CREATININE 2.0 08/15/2020    CALCIUM 9.3 08/15/2020    GFRAA 39 08/15/2020    LABGLOM 32 08/15/2020     Imaging:  Ct Abdomen Pelvis Wo Contrast Additional Contrast? None    Result Date: 2020  EXAMINATION: CT OF THE ABDOMEN AND PELVIS WITHOUT CONTRAST 2020 9:52 pm TECHNIQUE: CT of the abdomen and pelvis was performed without the administration of intravenous contrast. Multiplanar reformatted images are provided for review.  Dose modulation, iterative reconstruction, and/or weight based adjustment of the mA/kV was utilized to reduce the radiation dose to as low as reasonably achievable. COMPARISON: None. HISTORY: ORDERING SYSTEM PROVIDED HISTORY: nausea, vomiting, abdominal pain TECHNOLOGIST PROVIDED HISTORY: Reason for exam:->nausea, vomiting, abdominal pain Additional Contrast?->None Reason for Exam: nausea, vomiting, abdominal pain Acuity: Acute Type of Exam: Initial FINDINGS: Lower Chest: There is dependent consolidation in the lungs. Organs: There is mild right hydronephrosis. There is no obstructing stone. No acute abnormality of the liver, spleen, pancreas, adrenals, gallbladder, or left kidney. GI/Bowel: Bowel caliber is normal.  There is no evidence of active bowel inflammation. There is no evidence of acute appendicitis. Pelvis: In the right pelvic sidewall is a 56 x 67 x 66 mm (approximately) ill-defined mass adjacent to the iliac vessels. Borders of the mass are ill-defined. It medially displaces the right ureter. The mass appears somewhat elongated along the course of the iliac vein. The mass has an average density of 35 Hounsfield units. The urinary bladder is unremarkable. There are clips from prostatectomy. Peritoneum/Retroperitoneum: See above. No free air or free fluid. Bones/Soft Tissues: No acute osseous abnormality. Right pelvic mass. Differential diagnosis includes subacute hematoma or infiltrative malignancy. Mild right hydronephrosis due to compression of the ureter by the mass. Bibasilar pulmonary consolidation or atelectasis. Assessment & Plan:      Garima Sheridan is a 79y.o. year old male admitted 8/14/2020 for pelvic mass. H/o prostate cancer s/p RALP 1/2010 at [facility]. Pathology report = [place] 3+4 with negative margins. Pt on Eligard. PSA: 2 3/19, 2.9 6/19    1) Pelvic Mass   8/14/20 CT a/p: Right pelvic mass measuring 56 x 67 x 66 mm, ill-defined mass adjacent to the iliac vessels that medially displaces the right ureter.  The mass has an average density of 35

## 2020-08-15 NOTE — ED NOTES
Bladder scan showed 9 ml of fluid in bladder, Dr Adam Miguel notified.      Devang Salinas, RN  08/14/20 2042

## 2020-08-15 NOTE — ED NOTES
Pt daughter at bedside, states patient has been complaining of abdominal pain, has been vomiting, has been constipated and has not been urinating as frequently as normal.   Daughter states has also had increased weakness, has not been ambulating and she has been  Having to use the wheel chair for him recently.       Laura Watkins RN  08/14/20 4736

## 2020-08-15 NOTE — PROGRESS NOTES
PAtient came in on admission with no skin issues to note. Patient does have some scleroderma on face but it is noted on history.   Will was the second to witness patients skin check off

## 2020-08-16 ENCOUNTER — APPOINTMENT (OUTPATIENT)
Dept: GENERAL RADIOLOGY | Age: 80
DRG: 371 | End: 2020-08-16
Payer: MEDICARE

## 2020-08-16 LAB
ABO/RH: NORMAL
ADENOVIRUS DETECTION BY PCR: NOT DETECTED
ANION GAP SERPL CALCULATED.3IONS-SCNC: 11 MMOL/L (ref 4–16)
ANTIBODY SCREEN: NEGATIVE
BORDETELLA PARAPERTUSSIS BY PCR: NOT DETECTED
BORDETELLA PERTUSSIS PCR: NOT DETECTED
BUN BLDV-MCNC: 22 MG/DL (ref 6–23)
CALCIUM SERPL-MCNC: 9.1 MG/DL (ref 8.3–10.6)
CHLAMYDOPHILA PNEUMONIA PCR: NOT DETECTED
CHLORIDE BLD-SCNC: 104 MMOL/L (ref 99–110)
CO2: 24 MMOL/L (ref 21–32)
CORONAVIRUS 229E PCR: NOT DETECTED
CORONAVIRUS HKU1 PCR: NOT DETECTED
CORONAVIRUS NL63 PCR: NOT DETECTED
CORONAVIRUS OC43 PCR: NOT DETECTED
CREAT SERPL-MCNC: 1.9 MG/DL (ref 0.9–1.3)
CULTURE: NORMAL
D DIMER: 2214 NG/ML(DDU)
FERRITIN: 625 NG/ML (ref 30–400)
FIBRINOGEN LEVEL: 813 MG/DL (ref 196.9–442.1)
GFR AFRICAN AMERICAN: 41 ML/MIN/1.73M2
GFR NON-AFRICAN AMERICAN: 34 ML/MIN/1.73M2
GLUCOSE BLD-MCNC: 73 MG/DL (ref 70–99)
GLUCOSE BLD-MCNC: 79 MG/DL (ref 70–99)
HCT VFR BLD CALC: 37.7 % (ref 42–52)
HEMOGLOBIN: 11.6 GM/DL (ref 13.5–18)
HIGH SENSITIVE C-REACTIVE PROTEIN: >300 MG/L
HUMAN METAPNEUMOVIRUS PCR: NOT DETECTED
INFLUENZA A BY PCR: NOT DETECTED
INFLUENZA A H1 (2009) PCR: NOT DETECTED
INFLUENZA A H1 PANDEMIC PCR: NOT DETECTED
INFLUENZA A H3 PCR: NOT DETECTED
INFLUENZA B BY PCR: NOT DETECTED
INR BLD: 1.03 INDEX
LACTATE DEHYDROGENASE: 206 IU/L (ref 120–246)
Lab: NORMAL
MCH RBC QN AUTO: 29.8 PG (ref 27–31)
MCHC RBC AUTO-ENTMCNC: 30.8 % (ref 32–36)
MCV RBC AUTO: 96.9 FL (ref 78–100)
MYCOPLASMA PNEUMONIAE PCR: NOT DETECTED
PARAINFLUENZA 1 PCR: NOT DETECTED
PARAINFLUENZA 2 PCR: NOT DETECTED
PARAINFLUENZA 3 PCR: NOT DETECTED
PARAINFLUENZA 4 PCR: NOT DETECTED
PDW BLD-RTO: 13.7 % (ref 11.7–14.9)
PLATELET # BLD: 215 K/CU MM (ref 140–440)
PMV BLD AUTO: 9.2 FL (ref 7.5–11.1)
POTASSIUM SERPL-SCNC: 4.1 MMOL/L (ref 3.5–5.1)
PROCALCITONIN: 0.42
PROTHROMBIN TIME: 12.5 SECONDS (ref 11.7–14.5)
RBC # BLD: 3.89 M/CU MM (ref 4.6–6.2)
RHINOVIRUS ENTEROVIRUS PCR: NOT DETECTED
RSV PCR: NOT DETECTED
SODIUM BLD-SCNC: 139 MMOL/L (ref 135–145)
SPECIMEN: NORMAL
WBC # BLD: 12.3 K/CU MM (ref 4–10.5)

## 2020-08-16 PROCEDURE — 6370000000 HC RX 637 (ALT 250 FOR IP): Performed by: HOSPITALIST

## 2020-08-16 PROCEDURE — 86850 RBC ANTIBODY SCREEN: CPT

## 2020-08-16 PROCEDURE — 36415 COLL VENOUS BLD VENIPUNCTURE: CPT

## 2020-08-16 PROCEDURE — 87899 AGENT NOS ASSAY W/OPTIC: CPT

## 2020-08-16 PROCEDURE — 99232 SBSQ HOSP IP/OBS MODERATE 35: CPT | Performed by: SURGERY

## 2020-08-16 PROCEDURE — 6360000002 HC RX W HCPCS: Performed by: NURSE PRACTITIONER

## 2020-08-16 PROCEDURE — 85379 FIBRIN DEGRADATION QUANT: CPT

## 2020-08-16 PROCEDURE — 85610 PROTHROMBIN TIME: CPT

## 2020-08-16 PROCEDURE — 83615 LACTATE (LD) (LDH) ENZYME: CPT

## 2020-08-16 PROCEDURE — 71045 X-RAY EXAM CHEST 1 VIEW: CPT

## 2020-08-16 PROCEDURE — 2060000000 HC ICU INTERMEDIATE R&B

## 2020-08-16 PROCEDURE — 87633 RESP VIRUS 12-25 TARGETS: CPT

## 2020-08-16 PROCEDURE — 86900 BLOOD TYPING SEROLOGIC ABO: CPT

## 2020-08-16 PROCEDURE — 2580000003 HC RX 258: Performed by: INTERNAL MEDICINE

## 2020-08-16 PROCEDURE — 82728 ASSAY OF FERRITIN: CPT

## 2020-08-16 PROCEDURE — 2580000003 HC RX 258: Performed by: HOSPITALIST

## 2020-08-16 PROCEDURE — 6360000002 HC RX W HCPCS: Performed by: HOSPITALIST

## 2020-08-16 PROCEDURE — 87449 NOS EACH ORGANISM AG IA: CPT

## 2020-08-16 PROCEDURE — 86141 C-REACTIVE PROTEIN HS: CPT

## 2020-08-16 PROCEDURE — 6360000002 HC RX W HCPCS: Performed by: INTERNAL MEDICINE

## 2020-08-16 PROCEDURE — 6370000000 HC RX 637 (ALT 250 FOR IP): Performed by: INTERNAL MEDICINE

## 2020-08-16 PROCEDURE — 87798 DETECT AGENT NOS DNA AMP: CPT

## 2020-08-16 PROCEDURE — 84145 PROCALCITONIN (PCT): CPT

## 2020-08-16 PROCEDURE — 87486 CHLMYD PNEUM DNA AMP PROBE: CPT

## 2020-08-16 PROCEDURE — 2700000000 HC OXYGEN THERAPY PER DAY

## 2020-08-16 PROCEDURE — 86235 NUCLEAR ANTIGEN ANTIBODY: CPT

## 2020-08-16 PROCEDURE — 86901 BLOOD TYPING SEROLOGIC RH(D): CPT

## 2020-08-16 PROCEDURE — U0002 COVID-19 LAB TEST NON-CDC: HCPCS

## 2020-08-16 PROCEDURE — 87040 BLOOD CULTURE FOR BACTERIA: CPT

## 2020-08-16 PROCEDURE — 85384 FIBRINOGEN ACTIVITY: CPT

## 2020-08-16 PROCEDURE — 85027 COMPLETE CBC AUTOMATED: CPT

## 2020-08-16 PROCEDURE — 80048 BASIC METABOLIC PNL TOTAL CA: CPT

## 2020-08-16 PROCEDURE — 82962 GLUCOSE BLOOD TEST: CPT

## 2020-08-16 PROCEDURE — 94761 N-INVAS EAR/PLS OXIMETRY MLT: CPT

## 2020-08-16 PROCEDURE — 87581 M.PNEUMON DNA AMP PROBE: CPT

## 2020-08-16 RX ORDER — ACETAMINOPHEN 325 MG/1
650 TABLET ORAL EVERY 6 HOURS PRN
Status: DISCONTINUED | OUTPATIENT
Start: 2020-08-16 | End: 2020-08-16 | Stop reason: SDUPTHER

## 2020-08-16 RX ORDER — ACETAMINOPHEN 650 MG/1
650 SUPPOSITORY RECTAL EVERY 6 HOURS PRN
Status: DISCONTINUED | OUTPATIENT
Start: 2020-08-16 | End: 2020-08-16 | Stop reason: SDUPTHER

## 2020-08-16 RX ORDER — LIDOCAINE 4 G/G
1 PATCH TOPICAL DAILY
Status: DISCONTINUED | OUTPATIENT
Start: 2020-08-16 | End: 2020-08-30 | Stop reason: HOSPADM

## 2020-08-16 RX ADMIN — CEFTRIAXONE 1 G: 1 INJECTION, POWDER, FOR SOLUTION INTRAMUSCULAR; INTRAVENOUS at 13:38

## 2020-08-16 RX ADMIN — ENTACAPONE 200 MG: 200 TABLET, FILM COATED ORAL at 10:47

## 2020-08-16 RX ADMIN — MORPHINE SULFATE 2 MG: 2 INJECTION, SOLUTION INTRAMUSCULAR; INTRAVENOUS at 03:55

## 2020-08-16 RX ADMIN — ENOXAPARIN SODIUM 30 MG: 30 INJECTION SUBCUTANEOUS at 10:46

## 2020-08-16 RX ADMIN — AMANTADINE HYDROCHLORIDE 100 MG: 100 CAPSULE, LIQUID FILLED ORAL at 10:58

## 2020-08-16 RX ADMIN — AZITHROMYCIN MONOHYDRATE 500 MG: 500 INJECTION, POWDER, LYOPHILIZED, FOR SOLUTION INTRAVENOUS at 10:42

## 2020-08-16 RX ADMIN — ROSUVASTATIN 40 MG: 40 TABLET, FILM COATED ORAL at 10:57

## 2020-08-16 RX ADMIN — CARBIDOPA AND LEVODOPA 2 TABLET: 25; 100 TABLET ORAL at 16:57

## 2020-08-16 RX ADMIN — RIVASTIGMINE TARTRATE 1.5 MG: 1.5 CAPSULE ORAL at 10:59

## 2020-08-16 RX ADMIN — CARBIDOPA AND LEVODOPA 2 TABLET: 25; 100 TABLET ORAL at 13:39

## 2020-08-16 RX ADMIN — BICALUTAMIDE 50 MG: 50 TABLET ORAL at 10:47

## 2020-08-16 RX ADMIN — RIVASTIGMINE TARTRATE 1.5 MG: 1.5 CAPSULE ORAL at 23:00

## 2020-08-16 RX ADMIN — ESCITALOPRAM OXALATE 10 MG: 10 TABLET ORAL at 21:33

## 2020-08-16 RX ADMIN — SODIUM CHLORIDE: 9 INJECTION, SOLUTION INTRAVENOUS at 23:18

## 2020-08-16 RX ADMIN — SODIUM CHLORIDE, PRESERVATIVE FREE 10 ML: 5 INJECTION INTRAVENOUS at 10:59

## 2020-08-16 RX ADMIN — ENTACAPONE 200 MG: 200 TABLET, FILM COATED ORAL at 16:57

## 2020-08-16 RX ADMIN — ASPIRIN 81 MG CHEWABLE TABLET 81 MG: 81 TABLET CHEWABLE at 10:57

## 2020-08-16 RX ADMIN — SODIUM CHLORIDE: 9 INJECTION, SOLUTION INTRAVENOUS at 10:41

## 2020-08-16 RX ADMIN — ENTACAPONE 200 MG: 200 TABLET, FILM COATED ORAL at 13:40

## 2020-08-16 RX ADMIN — CARBIDOPA AND LEVODOPA 2 TABLET: 25; 100 TABLET ORAL at 10:46

## 2020-08-16 RX ADMIN — AMANTADINE HYDROCHLORIDE 100 MG: 100 CAPSULE, LIQUID FILLED ORAL at 21:33

## 2020-08-16 RX ADMIN — SODIUM CHLORIDE, PRESERVATIVE FREE 10 ML: 5 INJECTION INTRAVENOUS at 21:34

## 2020-08-16 RX ADMIN — ENTACAPONE 200 MG: 200 TABLET, FILM COATED ORAL at 23:01

## 2020-08-16 RX ADMIN — CARBIDOPA AND LEVODOPA 2 TABLET: 25; 100 TABLET ORAL at 21:33

## 2020-08-16 ASSESSMENT — PAIN SCALES - WONG BAKER
WONGBAKER_NUMERICALRESPONSE: 8
WONGBAKER_NUMERICALRESPONSE: 0

## 2020-08-16 ASSESSMENT — PAIN SCALES - GENERAL: PAINLEVEL_OUTOF10: 8

## 2020-08-16 NOTE — PROGRESS NOTES
Called and left VM w callback number to Baylee Barnard, pt wife to update her on pt condition/transfer order.   Breann Lutz, RINAN, RN

## 2020-08-16 NOTE — PROGRESS NOTES
[facility]   Progress Note  [facility] 0 1 2      Date: 2020   Patient: Marry Cohn   : 1940   DOA: 2020   MRN: [id number]   ROOM#: [id number]     Admit Date: 2020     Collaborating Urologist on Call at time of admission: Dr. Althea Umanzor   CC: Abdominal pain  Reason for Consult: Pelvic mass, rt hydronephrosis    Subjective:     Pain: mild, no nausea and no vomiting,   Bowel Movement/Flatus:   Yes  Voiding: Renee catheter in place draining clear yellow urine    Pt resting in bed, states he is not very comfortable. Appears more lethargic today.     Objective:    Vitals:    BP (!) 189/82   Pulse 89   Temp 98.2 °F (36.8 °C) (Axillary)   Resp 16   Ht 6' 1\" (1.854 m)   Wt 161 lb (73 kg)   SpO2 94%   BMI 21.24 kg/m²    Temp  Av.1 °F (37.3 °C)  Min: 98 °F (36.7 °C)  Max: 100.7 °F (38.2 °C)       Intake/Output Summary (Last 24 hours) at 2020 0819  Last data filed at 2020 0356  Gross per 24 hour   Intake 1336.11 ml   Output 1050 ml   Net 286.11 ml       Physical Exam:   General appearance: alert, appears stated age, cooperative, fatigued and no distress  Head: Normocephalic, without obvious abnormality, atraumatic  Back: Mild left CVA tenderness  Abdomen: Soft, non-distended, mild TTP in RLQ    Labs:   WBC:    Lab Results   Component Value Date    WBC 12.3 2020      Hemoglobin/Hematocrit:    Lab Results   Component Value Date    HGB 11.6 2020    HCT 37.7 2020      BMP:   Lab Results   Component Value Date     2020    K 4.1 2020     2020    CO2 24 2020    BUN 22 2020    LABALBU 3.1 2020    CREATININE 1.9 2020    CALCIUM 9.1 2020    GFRAA 41 2020    LABGLOM 34 2020      PT/INR:    Lab Results   Component Value Date    PROTIME 13.4 08/15/2020    INR 1.11 08/15/2020     Imaging:  Ct Abdomen Pelvis Wo Contrast Additional Contrast? None    Result Date: 2020  EXAMINATION: without the administration of intravenous contrast. Dose modulation, iterative reconstruction, and/or weight based adjustment of the mA/kV was utilized to reduce the radiation dose to as low as reasonably achievable. COMPARISON: Brain MRI 04/09/2018 HISTORY: ORDERING SYSTEM PROVIDED HISTORY: lethargic TECHNOLOGIST PROVIDED HISTORY: Reason for exam:->lethargic Has a \"code stroke\" or \"stroke alert\" been called? ->No Reason for Exam: lethargic Acuity: Acute Type of Exam: Initial FINDINGS: Patient motion in some areas, partially limiting evaluation of those areas. BRAIN/VENTRICLES:  No masses nor acute intracranial hemorrhage. Intact gray/white matter differentiation without findings of acute ischemia. No mass effect nor midline shift. Patent basilar cisterns and foramen magnum. No hydrocephalus. Age-appropriate mild to moderate diffuse atrophy. Mild to moderate subcortical, deep, and periventricular white matter hypodensities likely due to chronic small vessel ischemia with additional pontine involvement. ORBITS:  Bilateral lens implants. Otherwise normal without acute abnormality. SINUSES:  Normally pneumatized and aerated. SOFT TISSUES/SKULL:  No acute soft tissue abnormality. Moderate atherosclerotic calcifications. No acute fracture. 1. No acute intracranial abnormality. 2. Chronic changes as above. Assessment & Plan:      Nadeem Cohen is a 79y.o. year old male admitted 8/14/2020 for pelvic mass. H/o prostate cancer s/p RALP 1/2010 at [facility]. Pathology report = Cecile 3+4 with negative margins. Pt on Eligard. PSA: 2 3/19, 2.9 6/19     1) Pelvic Mass              8/14/20 CT a/p: Right pelvic mass measuring 56 x 67 x 66 mm, ill-defined mass adjacent to the iliac vessels that medially displaces the right ureter. The mass has an average density of 35 Hounsfield units. Differential diagnosis includes subacute hematoma or infiltrative malignancy.  Mild right hydronephrosis due to compression of the ureter by the mass. Recommend tissue bx to confirm diagnosis   Plan for likely tissue bx by IR tomorrow  2) Mild Right Hydronephrosis              Likely ongoing for several weeks              Cr 1.9              Cystoscopy, right ureteral stent placement not indicated at this time              Will closely monitor  3) Prostate Cancer: s/p RALP 1/2010              On [facility]              8/15/20 PSA 2.76    Patient seen and examined, chart reviewed.      Electronically signed by Jed Foy PA-C on 8/16/2020 at 8:19 AM

## 2020-08-16 NOTE — PROGRESS NOTES
GENERAL SURGERY PROGRESS NOTE    CC/HPI:           Patient feels the same. Vitals:    08/15/20 1945 08/16/20 0345 08/16/20 0809 08/16/20 0845   BP: (!) 119/56 (!) 147/82 (!) 189/82    Pulse: 70 85 89    Resp: 16 16 16    Temp: 99.1 °F (37.3 °C) 98 °F (36.7 °C) 98.2 °F (36.8 °C)    TempSrc: Axillary Axillary Axillary    SpO2: 99%  94% 94%   Weight:       Height:         I/O last 3 completed shifts:   In: 1336.1 [P.O.:20; I.V.:1316.1]  Out: 1050 [Urine:1050]  I/O this shift:  In: 120 [P.O.:120]  Out: -     DIET GENERAL; Dysphagia Minced and Moist  Dietary Nutrition Supplements: Renal Oral Supplement    Recent Results (from the past 48 hour(s))   CBC Auto Differential    Collection Time: 08/14/20  7:40 PM   Result Value Ref Range    WBC 12.8 (H) 4.0 - 10.5 K/CU MM    RBC 4.19 (L) 4.6 - 6.2 M/CU MM    Hemoglobin 12.7 (L) 13.5 - 18.0 GM/DL    Hematocrit 40.2 (L) 42 - 52 %    MCV 95.9 78 - 100 FL    MCH 30.3 27 - 31 PG    MCHC 31.6 (L) 32.0 - 36.0 %    RDW 13.5 11.7 - 14.9 %    Platelets 598 222 - 942 K/CU MM    MPV 9.4 7.5 - 11.1 FL    Differential Type AUTOMATED DIFFERENTIAL     Segs Relative 87.2 (H) 36 - 66 %    Lymphocytes % 3.9 (L) 24 - 44 %    Monocytes % 8.1 (H) 0 - 4 %    Eosinophils % 0.1 0 - 3 %    Basophils % 0.2 0 - 1 %    Segs Absolute 11.2 K/CU MM    Lymphocytes Absolute 0.5 K/CU MM    Monocytes Absolute 1.0 K/CU MM    Eosinophils Absolute 0.0 K/CU MM    Basophils Absolute 0.0 K/CU MM    Nucleated RBC % 0.0 %    Total Nucleated RBC 0.0 K/CU MM    Total Immature Neutrophil 0.07 K/CU MM    Immature Neutrophil % 0.5 (H) 0 - 0.43 %   Comprehensive Metabolic Panel w/ Reflex to MG    Collection Time: 08/14/20  7:40 PM   Result Value Ref Range    Sodium 133 (L) 135 - 145 MMOL/L    Potassium 4.3 3.5 - 5.1 MMOL/L    Chloride 98 (L) 99 - 110 mMol/L    CO2 23 21 - 32 MMOL/L    BUN 21 6 - 23 MG/DL    CREATININE 1.7 (H) 0.9 - 1.3 MG/DL    Glucose 104 (H) 70 - 99 MG/DL    Calcium 9.6 8.3 - 10.6 MG/DL    Alb 3.1 (L) 3.4 - 5.0 GM/DL    Total Protein 6.8 6.4 - 8.2 GM/DL    Total Bilirubin 0.4 0.0 - 1.0 MG/DL    ALT <5 (L) 10 - 40 U/L    AST 11 (L) 15 - 37 IU/L    Alkaline Phosphatase 98 40 - 129 IU/L    GFR Non- 39 (L) >60 mL/min/1.73m2    GFR  47 (L) >60 mL/min/1.73m2    Anion Gap 12 4 - 16   Lactic Acid, Plasma    Collection Time: 08/14/20  7:40 PM   Result Value Ref Range    Lactate 1.3 0.4 - 2.0 mMOL/L   Lipase    Collection Time: 08/14/20  7:40 PM   Result Value Ref Range    Lipase 34 13 - 60 IU/L   Urinalysis Reflex to Culture    Collection Time: 08/15/20 12:04 AM    Specimen: Urine   Result Value Ref Range    Color, UA LICO (A) YELLOW    Clarity, UA HAZY (A) CLEAR    Glucose, Urine NEGATIVE NEGATIVE MG/DL    Bilirubin Urine NEGATIVE NEGATIVE MG/DL    Ketones, Urine SMALL (A) NEGATIVE MG/DL    Specific Gravity, UA 1.017 1.001 - 1.035    Blood, Urine SMALL (A) NEGATIVE    pH, Urine 5.0 5.0 - 8.0    Protein,  (A) NEGATIVE MG/DL    Urobilinogen, Urine NORMAL 0.2 - 1.0 MG/DL    Nitrite Urine, Quantitative NEGATIVE NEGATIVE    Leukocyte Esterase, Urine NEGATIVE NEGATIVE    RBC, UA 4 (H) 0 - 3 /HPF    WBC, UA 4 (H) 0 - 2 /HPF    Bacteria, UA NEGATIVE NEGATIVE /HPF    Trans Epithel, UA <1 /HPF    Mucus, UA RARE (A) NEGATIVE HPF    Trichomonas, UA NONE SEEN NONE SEEN /HPF   Basic Metabolic Panel w/ Reflex to MG    Collection Time: 08/15/20  5:05 AM   Result Value Ref Range    Sodium 135 135 - 145 MMOL/L    Potassium 4.3 3.5 - 5.1 MMOL/L    Chloride 101 99 - 110 mMol/L    CO2 24 21 - 32 MMOL/L    Anion Gap 10 4 - 16    BUN 23 6 - 23 MG/DL    CREATININE 2.0 (H) 0.9 - 1.3 MG/DL    Glucose 88 70 - 99 MG/DL    Calcium 9.3 8.3 - 10.6 MG/DL    GFR Non- 32 (L) >60 mL/min/1.73m2    GFR  39 (L) >60 mL/min/1.73m2   Psa screening    Collection Time: 08/15/20  5:05 AM   Result Value Ref Range    PSA 2.76 0 - 4.0 NG/ML   Protime-INR    Collection Time: 08/15/20  5:05 AM   Result Value Ref Range    Protime 13.4 11.7 - 14.5 SECONDS    INR 1.11 INDEX   CBC Auto Differential    Collection Time: 08/15/20  5:05 AM   Result Value Ref Range    WBC 12.0 (H) 4.0 - 10.5 K/CU MM    RBC 4.10 (L) 4.6 - 6.2 M/CU MM    Hemoglobin 12.4 (L) 13.5 - 18.0 GM/DL    Hematocrit 38.9 (L) 42 - 52 %    MCV 94.9 78 - 100 FL    MCH 30.2 27 - 31 PG    MCHC 31.9 (L) 32.0 - 36.0 %    RDW 13.4 11.7 - 14.9 %    Platelets 237 751 - 666 K/CU MM    MPV 10.0 7.5 - 11.1 FL    Differential Type AUTOMATED DIFFERENTIAL     Segs Relative 87.2 (H) 36 - 66 %    Lymphocytes % 3.7 (L) 24 - 44 %    Monocytes % 8.3 (H) 0 - 4 %    Eosinophils % 0.1 0 - 3 %    Basophils % 0.2 0 - 1 %    Segs Absolute 10.4 K/CU MM    Lymphocytes Absolute 0.4 K/CU MM    Monocytes Absolute 1.0 K/CU MM    Eosinophils Absolute 0.0 K/CU MM    Basophils Absolute 0.0 K/CU MM    Nucleated RBC % 0.0 %    Total Nucleated RBC 0.0 K/CU MM    Total Immature Neutrophil 0.06 K/CU MM    Immature Neutrophil % 0.5 (H) 0 - 0.43 %   Electrolytes urine random    Collection Time: 08/15/20  2:15 PM   Result Value Ref Range    Sodium, Ur 122 35 - 167 MMOL/L    Potassium, Ur 26.9 22 - 119 MMOL/L    Chloride 115 43 - 210 MMOL/L   Protein / creatinine ratio, urine    Collection Time: 08/15/20  2:15 PM   Result Value Ref Range    Urine Total Protein 63.2 (H) <12 MG/DL    Creatinine, Ur 85.6 39 - 259 MG/DL    Prot/Creat Ratio, Ur 0.7 (H) <0.2   Urinalysis    Collection Time: 08/15/20  2:15 PM   Result Value Ref Range    Color, UA YELLOW YELLOW    Clarity, UA CLEAR CLEAR    Glucose, Urine NEGATIVE NEGATIVE MG/DL    Bilirubin Urine NEGATIVE NEGATIVE MG/DL    Ketones, Urine MODERATE (A) NEGATIVE MG/DL    Specific Gravity, UA 1.013 1.001 - 1.035    Blood, Urine SMALL (A) NEGATIVE    pH, Urine 6.0 5.0 - 8.0    Protein, UA 30 (A) NEGATIVE MG/DL    Urobilinogen, Urine NORMAL 0.2 - 1.0 MG/DL    Nitrite Urine, Quantitative NEGATIVE NEGATIVE    Leukocyte Esterase, Urine NEGATIVE NEGATIVE    RBC, UA 6 (H) 0 - 3 /HPF    WBC, UA 1 0 - 2 /HPF    Bacteria, UA NEGATIVE NEGATIVE /HPF    Mucus, UA RARE (A) NEGATIVE HPF    Trichomonas, UA NONE SEEN NONE SEEN /HPF   Ammonia    Collection Time: 08/15/20  8:25 PM   Result Value Ref Range    Ammonia 22 16 - 60 UMOL/L   Basic Metabolic Panel w/ Reflex to MG    Collection Time: 08/16/20  5:26 AM   Result Value Ref Range    Sodium 139 135 - 145 MMOL/L    Potassium 4.1 3.5 - 5.1 MMOL/L    Chloride 104 99 - 110 mMol/L    CO2 24 21 - 32 MMOL/L    Anion Gap 11 4 - 16    BUN 22 6 - 23 MG/DL    CREATININE 1.9 (H) 0.9 - 1.3 MG/DL    Glucose 79 70 - 99 MG/DL    Calcium 9.1 8.3 - 10.6 MG/DL    GFR Non- 34 (L) >60 mL/min/1.73m2    GFR  41 (L) >60 mL/min/1.73m2   CBC    Collection Time: 08/16/20  5:26 AM   Result Value Ref Range    WBC 12.3 (H) 4.0 - 10.5 K/CU MM    RBC 3.89 (L) 4.6 - 6.2 M/CU MM    Hemoglobin 11.6 (L) 13.5 - 18.0 GM/DL    Hematocrit 37.7 (L) 42 - 52 %    MCV 96.9 78 - 100 FL    MCH 29.8 27 - 31 PG    MCHC 30.8 (L) 32.0 - 36.0 %    RDW 13.7 11.7 - 14.9 %    Platelets 839 291 - 580 K/CU MM    MPV 9.2 7.5 - 11.1 FL   POCT Glucose    Collection Time: 08/16/20  5:37 AM   Result Value Ref Range    POC Glucose 73 70 - 99 MG/DL   Protime-INR    Collection Time: 08/16/20  6:01 AM   Result Value Ref Range    Protime 12.5 11.7 - 14.5 SECONDS    INR 1.03 INDEX   Fibrinogen    Collection Time: 08/16/20  6:01 AM   Result Value Ref Range    Fibrinogen 813 (H) 196.9 - 442.1 MG/DL   Lactate Dehydrogenase    Collection Time: 08/16/20  6:01 AM   Result Value Ref Range     120 - 246 IU/L   Ferritin    Collection Time: 08/16/20  6:01 AM   Result Value Ref Range    Ferritin 625 (H) 30 - 400 NG/ML   D-Dimer, Quantitative    Collection Time: 08/16/20  6:01 AM   Result Value Ref Range    D-Dimer, Quant 2214 (H) <230 NG/mL(DDU)   Procalcitonin    Collection Time: 08/16/20  6:01 AM   Result Value Ref Range    Procalcitonin 0.423 C-Reactive Protein    Collection Time: 08/16/20  6:01 AM   Result Value Ref Range    CRP, High Sensitivity >300.0 mg/L   TYPE AND SCREEN    Collection Time: 08/16/20 10:02 AM   Result Value Ref Range    ABO/Rh O NEGATIVE     Antibody Screen NEGATIVE        Scheduled Meds:   lidocaine  1 patch Transdermal Daily    amantadine  100 mg Oral BID    aspirin  81 mg Oral Daily    escitalopram  10 mg Oral Nightly    rivastigmine  1.5 mg Oral BID    rosuvastatin  40 mg Oral QAM    bicalutamide  50 mg Oral Daily    sodium chloride flush  10 mL Intravenous 2 times per day    enoxaparin  30 mg Subcutaneous Daily    carbidopa-levodopa  2 tablet Oral 4x Daily    And    entacapone  200 mg Oral 4x Daily    cefTRIAXone (ROCEPHIN) IV  1 g Intravenous Daily    azithromycin  500 mg Intravenous Daily       Continuous Infusions:   sodium chloride 100 mL/hr at 08/16/20 1041       Physical Exam:  HEENT: Anicteric sclerae, Oropharyngeal mucosae moist, pink and intact. Heart:  Normal S1 and S2, RRR  Lungs: Clear to auscultation bilaterally, No audible Wheezes or Rales. Extremities: No edema. Neuro: Alert and Oriented x 3, Non focal.  Abdomen: Soft, Benign, not MILDLY tender lower abdomen. ., Non distended, Positive bowel sounds. Active Problems:    Pelvic mass  Resolved Problems:    * No resolved hospital problems. *      Assessment and Plan:  CT yesterday:  Impression    Right pelvic mass.  Differential diagnosis includes subacute hematoma or    infiltrative malignancy.         Mild right hydronephrosis due to compression of the ureter by the mass.         Bibasilar pulmonary consolidation or atelectasis.       Diagnosis:      Patient Active Problem List   Diagnosis    Weight loss, unintentional    History of colon polyps    Pelvic mass        Assessment & plan:  Lorenza Vasquez is a very pleasant 79 y.o. male presenting with a pelvic mass.     Pelvic mass? Fluid / hematoma?  Noted, on CT percutaneous biopsy I recommend first, and then I'll manage according to its result.     Waiting for IR to proceed with biopsies, hopefully tomorrow, Monday.    ___________________________________________    Anastacio Elliott MD, FACS, [facility]  8/16/2020  12:37 PM

## 2020-08-16 NOTE — PROGRESS NOTES
Nephrology Progress Note  8/16/2020 10:15 AM  Subjective:   Admit Date: 8/14/2020  PCP: Chava Murphy MD  Interval History: pt weak and arousable but hard hearing . Stable uop    Diet: DIET GENERAL; Dysphagia Minced and Moist  Dietary Nutrition Supplements: Renal Oral Supplement  Pain is:None      Data:   Scheduled Meds:   amantadine  100 mg Oral BID    aspirin  81 mg Oral Daily    escitalopram  10 mg Oral Nightly    rivastigmine  1.5 mg Oral BID    rosuvastatin  40 mg Oral QAM    bicalutamide  50 mg Oral Daily    sodium chloride flush  10 mL Intravenous 2 times per day    enoxaparin  30 mg Subcutaneous Daily    carbidopa-levodopa  2 tablet Oral 4x Daily    And    entacapone  200 mg Oral 4x Daily    cefTRIAXone (ROCEPHIN) IV  1 g Intravenous Daily    azithromycin  500 mg Intravenous Daily     Continuous Infusions:   sodium chloride 100 mL/hr at 08/15/20 2311     PRN Meds:sodium chloride flush, acetaminophen **OR** acetaminophen, polyethylene glycol, promethazine **OR** ondansetron, morphine  I/O last 3 completed shifts: In: 1336.1 [P.O.:20; I.V.:1316.1]  Out: 1050 [Urine:1050]  No intake/output data recorded. Intake/Output Summary (Last 24 hours) at 8/16/2020 1015  Last data filed at 8/16/2020 0356  Gross per 24 hour   Intake 1336.11 ml   Output 1050 ml   Net 286.11 ml     CBC:   Recent Labs     08/14/20  1940 08/15/20  0505 08/16/20  0526   WBC 12.8* 12.0* 12.3*   HGB 12.7* 12.4* 11.6*    229 215     BMP:    Recent Labs     08/14/20  1940 08/15/20  0505 08/16/20  0526   * 135 139   K 4.3 4.3 4.1   CL 98* 101 104   CO2 23 24 24   BUN 21 23 22   CREATININE 1.7* 2.0* 1.9*   GLUCOSE 104* 88 79     Hepatic:   Recent Labs     08/14/20 1940   AST 11*   ALT <5*   BILITOT 0.4   ALKPHOS 98     Troponin: No results for input(s): TROPONINI in the last 72 hours. BNP: No results for input(s): BNP in the last 72 hours. Lipids: No results for input(s): CHOL, HDL in the last 72 hours.     Invalid input(s): LDLCALCU  ABGs: No results found for: PHART, PO2ART, SNC9XCE  INR:   Recent Labs     08/15/20  0505 08/16/20  0601   INR 1.11 1.03     Renal Labs  Albumin:    Lab Results   Component Value Date    LABALBU 3.1 08/14/2020     Calcium:    Lab Results   Component Value Date    CALCIUM 9.1 08/16/2020     Phosphorus:  No results found for: PHOS  U/A:    Lab Results   Component Value Date    NITRU NEGATIVE 08/15/2020    COLORU YELLOW 08/15/2020    WBCUA 1 08/15/2020    RBCUA 6 08/15/2020    MUCUS RARE 08/15/2020    TRICHOMONAS NONE SEEN 08/15/2020    BACTERIA NEGATIVE 08/15/2020    CLARITYU CLEAR 08/15/2020    SPECGRAV 1.013 08/15/2020    UROBILINOGEN NORMAL 08/15/2020    BILIRUBINUR NEGATIVE 08/15/2020    BLOODU SMALL 08/15/2020    KETUA MODERATE 08/15/2020     ABG:  No results found for: PHART, GDI2GPB, PO2ART, ADS1BWN, BEART, THGBART, NPO0BVZ, X6BGYKGO  HgBA1c:  No results found for: LABA1C  Microalbumen/Creatinine ratio:  No components found for: RUCREAT          Objective:   Vitals: BP (!) 189/82   Pulse 89   Temp 98.2 °F (36.8 °C) (Axillary)   Resp 16   Ht 6' 1\" (1.854 m)   Wt 161 lb (73 kg)   SpO2 94%   BMI 21.24 kg/m²   General appearance: awake weak confused  HEENT: Head: Normal, normocephalic, atraumatic. Neck: supple, symmetrical, trachea midline  Lungs: diminished breath sounds bilaterally  Heart: S1, S2 normal  Abdomen: abnormal findings:  soft nt  Extremities: edema trace  Neurologic: Mental status: alertness: awake weak hard hearing      Patient Active Problem List:     Weight loss, unintentional     History of colon polyps     Pelvic mass    Assessment and Plan:      IMP:  1 arf from atn/pra/hydro  2 right side mass possible malignancy  3 hx prostate ca  4 hyponatremia  5 protein malnutrtion with constipation  6 hx scleroderma    Plan     1 slowly improved af and + uop will; monitor   2 fu plan for bx and diagnosis  3 urology monitor  4 na stable  5 ? Able to take good oral diet ?  Need peg if not better, try assist with meals  6 fu sclerderma etiology and not felt sclerderma renal crisis  Will follow           Jfef German MD

## 2020-08-16 NOTE — PROGRESS NOTES
Patient arrived to unit at this time. Patient is alert to self and place. Patient denies any pain. Patient is on 2 liters at 100 percent. Will wean off. Came to unit from 4 because of new use of 02. Patient was oriented to room. Call light is in reach and bed alarm is on. Skin sweep done with Trudy Estrada Rn and no skin issues noted expect minor abrasions scattered.

## 2020-08-16 NOTE — PROGRESS NOTES
Nursing notified pt requiring oxygen and had fever. CT with consolidation and on IV abx. Consult IR for biopsy of mass.  Will have to r/o covid with above

## 2020-08-16 NOTE — PROGRESS NOTES
Lorenza Pain is a 79 y.o. male has low grade fever and new oxygen requirement    Current Facility-Administered Medications   Medication Dose Route Frequency Provider Last Rate Last Dose    amantadine (SYMMETREL) capsule 100 mg  100 mg Oral BID Petr Hernandez MD   100 mg at 08/15/20 2106    aspirin chewable tablet 81 mg  81 mg Oral Daily Petr Hernandez MD   81 mg at 08/15/20 1007    escitalopram (LEXAPRO) tablet 10 mg  10 mg Oral Nightly Petr Hernandez MD   10 mg at 08/15/20 2109    rivastigmine (EXELON) capsule 1.5 mg  1.5 mg Oral BID Petr Hernandez MD   1.5 mg at 08/15/20 2109    rosuvastatin (CRESTOR) tablet 40 mg  40 mg Oral QAM Petr Hernandez MD   40 mg at 08/15/20 1007    bicalutamide (CASODEX) chemo tablet 50 mg  50 mg Oral Daily Petr Hernandez MD   50 mg at 08/15/20 0955    0.9 % sodium chloride infusion   Intravenous Continuous Petr Hernandez  mL/hr at 08/15/20 2311      sodium chloride flush 0.9 % injection 10 mL  10 mL Intravenous 2 times per day Petr Hernandez MD   10 mL at 08/15/20 0956    sodium chloride flush 0.9 % injection 10 mL  10 mL Intravenous PRN Petr Hernandez MD        acetaminophen (TYLENOL) tablet 650 mg  650 mg Oral Q6H PRN Petr Hernandez MD   650 mg at 08/15/20 1600    Or    acetaminophen (TYLENOL) suppository 650 mg  650 mg Rectal Q6H PRN Petr Hernandez MD   650 mg at 08/15/20 2311    polyethylene glycol (GLYCOLAX) packet 17 g  17 g Oral Daily PRN Petr Hernandez MD        promethazine (PHENERGAN) tablet 12.5 mg  12.5 mg Oral Q6H PRN Petr Hernandez MD        Or    ondansetron (ZOFRAN) injection 4 mg  4 mg Intravenous Q6H PRN Petr Hernandez MD        enoxaparin (LOVENOX) injection 30 mg  30 mg Subcutaneous Daily Petr Hernandez MD   30 mg at 08/15/20 0954    carbidopa-levodopa (SINEMET)  MG per tablet 2 tablet  2 tablet Oral 4x Daily Petr Hernandez MD   2 tablet at 08/15/20 210    And    entacapone (COMTAN) tablet 200 mg  200 mg Oral 4x Daily Sandra Ochoa MD   200 mg at 08/15/20 2109    morphine (PF) injection 2 mg  2 mg Intravenous Q4H PRN Cyndi Azevedo APRN - CNP   2 mg at 08/16/20 0355    cefTRIAXone (ROCEPHIN) 1 g IVPB in 50 mL D5W minibag  1 g Intravenous Daily Wilfred Snyder MD   Stopped at 08/15/20 1643    azithromycin (ZITHROMAX) 500 mg in dextrose 5 % 250 mL IVPB  500 mg Intravenous Daily Wilfred Snyder MD   Stopped at 08/15/20 2114     No Known Allergies  Active Problems:    Pelvic mass  Resolved Problems:    * No resolved hospital problems. *    Blood pressure (!) 147/82, pulse 85, temperature 98 °F (36.7 °C), temperature source Axillary, resp. rate 16, height 6' 1\" (1.854 m), weight 161 lb (73 kg), SpO2 99 %. Subjective:  Diet:  Poor intake. Pain:  He complains of pain that is mild. Objective:  General Appearance:  Comfortable. Vital signs: (most recent): Blood pressure (!) 189/82, pulse 89, temperature 98.2 °F (36.8 °C), temperature source Axillary, resp. rate 16, height 6' 1\" (1.854 m), weight 161 lb (73 kg), SpO2 94 %. Vital signs are normal.  (Low grade ). HEENT: Normal HEENT exam.  (Tanned face compared to torso)    Lungs:  Normal effort. Heart: Normal rate. Abdomen: Abdomen is soft. (No peritoneal signs. BS+). Extremities: Decreased range of motion. Neurological: Patient is alert. Pupils:  Pupils are equal, round, and reactive to light. Skin:  Warm.       Assessment & Plan  Abd apin due to pelvic mass with right sided hydronephrosis  -surg and urology eval  -Ct with right pelvic mass   -consulted IR for biopsy of mass  Hypoxia suspected aspiration vs gram pos pna  -CT with consolidation and on IV abx  -today needing oxygen and will rule out covid  -CXR  Dysphagia  -on dsyphagia diet  SLIM  - IVF  -has hydronephrosis and consult nephro  Mod PCM  Prostate cancer /sp prostatectomy   -bicalutamide  Dementia parkisons  -excelon, amantidine, sinemet, entapacone  DVT prophyalxis  -lovenox      CXR and r/o covid. Will need biopsy of pelvic mass.  IR consult placed  Joshua Lam MD  8/16/2020

## 2020-08-17 LAB
ANION GAP SERPL CALCULATED.3IONS-SCNC: 9 MMOL/L (ref 4–16)
APTT: 41.6 SECONDS (ref 25.1–37.1)
BASOPHILS ABSOLUTE: 0 K/CU MM
BASOPHILS RELATIVE PERCENT: 0.1 % (ref 0–1)
BUN BLDV-MCNC: 17 MG/DL (ref 6–23)
CALCIUM SERPL-MCNC: 6.7 MG/DL (ref 8.3–10.6)
CHLORIDE BLD-SCNC: 114 MMOL/L (ref 99–110)
CO2: 18 MMOL/L (ref 21–32)
CREAT SERPL-MCNC: 1.2 MG/DL (ref 0.9–1.3)
D DIMER: 1440 NG/ML(DDU)
DIFFERENTIAL TYPE: ABNORMAL
EOSINOPHILS ABSOLUTE: 0.1 K/CU MM
EOSINOPHILS RELATIVE PERCENT: 0.7 % (ref 0–3)
FIBRINOGEN LEVEL: 640 MG/DL (ref 196.9–442.1)
GFR AFRICAN AMERICAN: >60 ML/MIN/1.73M2
GFR NON-AFRICAN AMERICAN: 58 ML/MIN/1.73M2
GLUCOSE BLD-MCNC: 62 MG/DL (ref 70–99)
HCT VFR BLD CALC: 30.8 % (ref 42–52)
HEMOGLOBIN: 9.7 GM/DL (ref 13.5–18)
IMMATURE NEUTROPHIL %: 0.6 % (ref 0–0.43)
INR BLD: 1.11 INDEX
LEGIONELLA URINARY AG: NEGATIVE
LYMPHOCYTES ABSOLUTE: 0.5 K/CU MM
LYMPHOCYTES RELATIVE PERCENT: 5.4 % (ref 24–44)
MAGNESIUM: 1.5 MG/DL (ref 1.8–2.4)
MCH RBC QN AUTO: 30.5 PG (ref 27–31)
MCHC RBC AUTO-ENTMCNC: 31.5 % (ref 32–36)
MCV RBC AUTO: 96.9 FL (ref 78–100)
MONOCYTES ABSOLUTE: 0.6 K/CU MM
MONOCYTES RELATIVE PERCENT: 6.5 % (ref 0–4)
NUCLEATED RBC %: 0 %
PDW BLD-RTO: 13.7 % (ref 11.7–14.9)
PLATELET # BLD: 184 K/CU MM (ref 140–440)
PMV BLD AUTO: 9.3 FL (ref 7.5–11.1)
POTASSIUM SERPL-SCNC: 3.1 MMOL/L (ref 3.5–5.1)
POTASSIUM SERPL-SCNC: 4.3 MMOL/L (ref 3.5–5.1)
PROSTATE SPECIFIC ANTIGEN FREE: 0.6 NG/ML
PROSTATE SPECIFIC ANTIGEN PERCENT FREE: 19 %
PROSTATE SPECIFIC ANTIGEN: 3.1 NG/ML (ref 0–4)
PROTHROMBIN TIME: 13.5 SECONDS (ref 11.7–14.5)
RBC # BLD: 3.18 M/CU MM (ref 4.6–6.2)
REASON FOR REJECTION: NORMAL
REJECTED TEST: NORMAL
SARS-COV-2: NOT DETECTED
SEGMENTED NEUTROPHILS ABSOLUTE COUNT: 8.3 K/CU MM
SEGMENTED NEUTROPHILS RELATIVE PERCENT: 86.7 % (ref 36–66)
SODIUM BLD-SCNC: 141 MMOL/L (ref 135–145)
SOURCE: NORMAL
STREP PNEUMONIAE ANTIGEN: NORMAL
TOTAL IMMATURE NEUTOROPHIL: 0.06 K/CU MM
TOTAL NUCLEATED RBC: 0 K/CU MM
WBC # BLD: 9.6 K/CU MM (ref 4–10.5)

## 2020-08-17 PROCEDURE — 2580000003 HC RX 258: Performed by: INTERNAL MEDICINE

## 2020-08-17 PROCEDURE — 85610 PROTHROMBIN TIME: CPT

## 2020-08-17 PROCEDURE — 85379 FIBRIN DEGRADATION QUANT: CPT

## 2020-08-17 PROCEDURE — 83735 ASSAY OF MAGNESIUM: CPT

## 2020-08-17 PROCEDURE — 6360000002 HC RX W HCPCS: Performed by: HOSPITALIST

## 2020-08-17 PROCEDURE — 2580000003 HC RX 258: Performed by: HOSPITALIST

## 2020-08-17 PROCEDURE — 80048 BASIC METABOLIC PNL TOTAL CA: CPT

## 2020-08-17 PROCEDURE — 84132 ASSAY OF SERUM POTASSIUM: CPT

## 2020-08-17 PROCEDURE — 1200000000 HC SEMI PRIVATE

## 2020-08-17 PROCEDURE — 6360000002 HC RX W HCPCS: Performed by: INTERNAL MEDICINE

## 2020-08-17 PROCEDURE — 85730 THROMBOPLASTIN TIME PARTIAL: CPT

## 2020-08-17 PROCEDURE — 85384 FIBRINOGEN ACTIVITY: CPT

## 2020-08-17 PROCEDURE — 94761 N-INVAS EAR/PLS OXIMETRY MLT: CPT

## 2020-08-17 PROCEDURE — 6370000000 HC RX 637 (ALT 250 FOR IP): Performed by: INTERNAL MEDICINE

## 2020-08-17 PROCEDURE — 87081 CULTURE SCREEN ONLY: CPT

## 2020-08-17 PROCEDURE — 2700000000 HC OXYGEN THERAPY PER DAY

## 2020-08-17 PROCEDURE — 85025 COMPLETE CBC W/AUTO DIFF WBC: CPT

## 2020-08-17 PROCEDURE — 99233 SBSQ HOSP IP/OBS HIGH 50: CPT | Performed by: SURGERY

## 2020-08-17 RX ORDER — POTASSIUM CHLORIDE 7.45 MG/ML
10 INJECTION INTRAVENOUS PRN
Status: DISCONTINUED | OUTPATIENT
Start: 2020-08-17 | End: 2020-08-30 | Stop reason: HOSPADM

## 2020-08-17 RX ORDER — MAGNESIUM SULFATE 4 G/50ML
4 INJECTION INTRAVENOUS ONCE
Status: COMPLETED | OUTPATIENT
Start: 2020-08-17 | End: 2020-08-17

## 2020-08-17 RX ADMIN — ASPIRIN 81 MG CHEWABLE TABLET 81 MG: 81 TABLET CHEWABLE at 09:45

## 2020-08-17 RX ADMIN — AMANTADINE HYDROCHLORIDE 100 MG: 100 CAPSULE, LIQUID FILLED ORAL at 09:45

## 2020-08-17 RX ADMIN — ENTACAPONE 200 MG: 200 TABLET, FILM COATED ORAL at 12:05

## 2020-08-17 RX ADMIN — MAGNESIUM SULFATE HEPTAHYDRATE 4 G: 80 INJECTION, SOLUTION INTRAVENOUS at 14:16

## 2020-08-17 RX ADMIN — ENOXAPARIN SODIUM 30 MG: 30 INJECTION SUBCUTANEOUS at 09:45

## 2020-08-17 RX ADMIN — BICALUTAMIDE 50 MG: 50 TABLET ORAL at 09:28

## 2020-08-17 RX ADMIN — AMANTADINE HYDROCHLORIDE 100 MG: 100 CAPSULE, LIQUID FILLED ORAL at 20:12

## 2020-08-17 RX ADMIN — CARBIDOPA AND LEVODOPA 2 TABLET: 25; 100 TABLET ORAL at 17:04

## 2020-08-17 RX ADMIN — SODIUM CHLORIDE, PRESERVATIVE FREE 10 ML: 5 INJECTION INTRAVENOUS at 20:12

## 2020-08-17 RX ADMIN — CARBIDOPA AND LEVODOPA 2 TABLET: 25; 100 TABLET ORAL at 12:05

## 2020-08-17 RX ADMIN — ESCITALOPRAM OXALATE 10 MG: 10 TABLET ORAL at 20:12

## 2020-08-17 RX ADMIN — POTASSIUM CHLORIDE 10 MEQ: 7.46 INJECTION, SOLUTION INTRAVENOUS at 15:35

## 2020-08-17 RX ADMIN — CEFTRIAXONE 1 G: 1 INJECTION, POWDER, FOR SOLUTION INTRAMUSCULAR; INTRAVENOUS at 09:44

## 2020-08-17 RX ADMIN — RIVASTIGMINE TARTRATE 1.5 MG: 1.5 CAPSULE ORAL at 09:28

## 2020-08-17 RX ADMIN — POTASSIUM CHLORIDE 10 MEQ: 7.46 INJECTION, SOLUTION INTRAVENOUS at 13:32

## 2020-08-17 RX ADMIN — POTASSIUM CHLORIDE 10 MEQ: 7.46 INJECTION, SOLUTION INTRAVENOUS at 17:46

## 2020-08-17 RX ADMIN — POTASSIUM CHLORIDE 10 MEQ: 7.46 INJECTION, SOLUTION INTRAVENOUS at 14:24

## 2020-08-17 RX ADMIN — CALCIUM GLUCONATE 1 G: 94 INJECTION, SOLUTION INTRAVENOUS at 09:44

## 2020-08-17 RX ADMIN — ENTACAPONE 200 MG: 200 TABLET, FILM COATED ORAL at 17:04

## 2020-08-17 RX ADMIN — ENTACAPONE 200 MG: 200 TABLET, FILM COATED ORAL at 20:12

## 2020-08-17 RX ADMIN — CARBIDOPA AND LEVODOPA 2 TABLET: 25; 100 TABLET ORAL at 09:44

## 2020-08-17 RX ADMIN — ENTACAPONE 200 MG: 200 TABLET, FILM COATED ORAL at 09:28

## 2020-08-17 RX ADMIN — CARBIDOPA AND LEVODOPA 2 TABLET: 25; 100 TABLET ORAL at 20:12

## 2020-08-17 RX ADMIN — SODIUM CHLORIDE: 9 INJECTION, SOLUTION INTRAVENOUS at 13:32

## 2020-08-17 RX ADMIN — ROSUVASTATIN 40 MG: 40 TABLET, FILM COATED ORAL at 09:28

## 2020-08-17 RX ADMIN — AZITHROMYCIN MONOHYDRATE 500 MG: 500 INJECTION, POWDER, LYOPHILIZED, FOR SOLUTION INTRAVENOUS at 09:28

## 2020-08-17 RX ADMIN — RIVASTIGMINE TARTRATE 1.5 MG: 1.5 CAPSULE ORAL at 20:12

## 2020-08-17 RX ADMIN — SODIUM CHLORIDE, PRESERVATIVE FREE 10 ML: 5 INJECTION INTRAVENOUS at 09:28

## 2020-08-17 ASSESSMENT — PAIN SCALES - WONG BAKER

## 2020-08-17 ASSESSMENT — PAIN SCALES - GENERAL
PAINLEVEL_OUTOF10: 0
PAINLEVEL_OUTOF10: 0

## 2020-08-17 NOTE — PROGRESS NOTES
Physician Progress Note      Surya Bond  CSN #:                  [id number]  :                       1940  ADMIT DATE:       2020 7:25 PM  [facility] DATE:  RESPONDING  PROVIDER #:        Sobia Pierce MD          QUERY TEXT:    Dr Herschel Gowers    Pt admitted with SLIM . Pt noted to have WBC 12.3, and possible gram positive   Pneumonia in the  progress notes and discharge summary if you are evaluating and /or treating   any of the following: The medical record reflects the following:  Risk Factors: Gram positive pneumonia/aspiration pneumonia, SLIM, ATN, Hypoxia  Clinical Indicators: wbc 12.3, Segs 87 ,Procalcitonin 0.423, LA 1.3,   Respirations 32, Tachycardia at 93, Febrile 100.7 Axillary , dysphasia,   possible aspiration \"here are bilateral symmetric basilar predominant   ground-glass densities with mild interstitial opacities. Bilateral symmetric   interstitial opacities more prominent lung bases. This could represent possible   edema versus atypical pneumonia. \" per chest xray, Treatment: Chest xay,   Speech cx, NS @ 100, Zithromax, Rocephin, labs, Covid testing, Monitoring  Options provided:  -- Sepsis, present on admission  -- No Sepsis, localized infection only  -- Other - I will add my own diagnosis  -- Disagree - Not applicable / Not valid  -- Disagree - Clinically unable to determine / Unknown  -- Refer to Clinical Documentation Reviewer    PROVIDER RESPONSE TEXT:    This patient has localized infection only, patient is not septic.     Query created by: Lauren Valdez on 2020 1:54 PM      Electronically signed by:  Sobia Pierce MD 2020 3:13 PM

## 2020-08-17 NOTE — PROGRESS NOTES
dw IR and may need ct pelvis with contrast prior to biopsy as not want hit a vessel. Risk to renal with contrast but need bx for diagnosis and limit contrast. Agree with need and benefit > risk. Maintain on ivf and monitor. Can proceed with ct with contrast. Will dw pt.

## 2020-08-17 NOTE — PROGRESS NOTES
Hospitalist Progress Note      Name:  Mallika Sanchez /Age/Sex: 1940  (79 y.o. male)   MRN & CSN:  [id number] & [id number] Admission Date/Time: 2020  7:25 PM   Location:  -A PCP: Apolonia Mcwilliams MD         Hospital Day: 4    Assessment and Plan:   Mallika Sanchez is a 79 y.o.  male  who presents with     1) Abd pain due to pelvic mass with right sided hydronephrosis  -CT A/P: Rt pelvic mass with mild Rt hydronephrosis  -Urology and General Surgery on board  -IR consulted for biopsy      2) Acute Resp distress with hypoxia 2/2 possible PNA   -CXR: Bilateral symmetric interstitial opacities  -Concerning for covid  -Follow COVID result  -CRP significantly elevated  -Continue IV abx    3) SLIM  -Cr 1.7--1.2  -Nephrology on board    Other chronic medical conditions; medication resumed unless contraindicated    -Mod PCM  -Prostate cancer /sp prostatectomy   -Dementia parkisons      Diet DIET GENERAL; Dysphagia Minced and Moist  Dietary Nutrition Supplements: Renal Oral Supplement   DVT Prophylaxis [] Lovenox, []  Heparin, [] SCDs, [] Ambulation   GI Prophylaxis [] PPI,  [] H2 Blocker,  [] Carafate,  [] Diet/Tube Feeds   Code Status Full Code   Disposition TBD   MDM      History of Present Illness:     Patient was seen and examined  Denied any worsening abdominal pain No fever, chills, N/V/D  No chest pain or palpitations       Ten point ROS reviewed negative, unless as noted above    Objective: Intake/Output Summary (Last 24 hours) at 2020 1317  Last data filed at 2020 1244  Gross per 24 hour   Intake 820 ml   Output 1400 ml   Net -580 ml      Vitals:   Vitals:    20 1200   BP:    Pulse:    Resp: 19   Temp:    SpO2: 100%     Physical Exam:   GEN Awake male, sitting upright in bed in no apparent distress. Appears given age. EYES Pupils are equally round. No scleral erythema, discharge, or conjunctivitis.   HENT Mucous membranes are moist. Oral pharynx without exudates, no evidence of thrush. NECK Supple, no apparent thyromegaly or masses. RESP Clear to auscultation, no wheezes, rales or rhonchi. Symmetric chest movement while on 2 L of O2  CARDIO/VASC S1/S2 auscultated. Regular rate without appreciable murmurs, rubs, or gallops. No JVD or carotid bruits. Peripheral pulses equal bilaterally and palpable. No peripheral edema. GI Abdomen is soft without significant tenderness, masses, or guarding. Bowel sounds are normoactive. Rectal exam deferred.  No costovertebral angle tenderness. Normal appearing external genitalia. Renee catheter is not present. HEME/LYMPH No palpable cervical lymphadenopathy and no hepatosplenomegaly. No petechiae or ecchymoses. MSK No gross joint deformities. SKIN Normal coloration, warm, dry. NEURO Cranial nerves appear grossly intact, normal speech, no lateralizing weakness. PSYCH Awake, alert. Affect appropriate.     Medications:   Medications:    lidocaine  1 patch Transdermal Daily    amantadine  100 mg Oral BID    aspirin  81 mg Oral Daily    escitalopram  10 mg Oral Nightly    rivastigmine  1.5 mg Oral BID    rosuvastatin  40 mg Oral QAM    bicalutamide  50 mg Oral Daily    sodium chloride flush  10 mL Intravenous 2 times per day    enoxaparin  30 mg Subcutaneous Daily    carbidopa-levodopa  2 tablet Oral 4x Daily    And    entacapone  200 mg Oral 4x Daily    cefTRIAXone (ROCEPHIN) IV  1 g Intravenous Daily    azithromycin  500 mg Intravenous Daily      Infusions:    sodium chloride 100 mL/hr at 08/16/20 2318     PRN Meds: potassium chloride, 10 mEq, PRN  sodium chloride flush, 10 mL, PRN  acetaminophen, 650 mg, Q6H PRN    Or  acetaminophen, 650 mg, Q6H PRN  polyethylene glycol, 17 g, Daily PRN  promethazine, 12.5 mg, Q6H PRN    Or  ondansetron, 4 mg, Q6H PRN  morphine, 2 mg, Q4H PRN          Electronically signed by Trinity Ritchie MD on 8/17/2020 at 1:17 PM

## 2020-08-17 NOTE — PROGRESS NOTES
Patient spouse Nick Nowak updated on patient condition. Cindy states she will update other family members and instructed nurse not to call at this time.

## 2020-08-17 NOTE — PROGRESS NOTES
Family would like to arrange video chat with patient.  Video chat can be accommodated via CN on shift

## 2020-08-17 NOTE — PLAN OF CARE
Problem: Falls - Risk of:  Goal: Will remain free from falls  Description: Will remain free from falls  8/17/2020 1126 by Sima Carroll RN  Outcome: Ongoing  8/17/2020 0042 by Carlos Lau  Outcome: Ongoing  Goal: Absence of physical injury  Description: Absence of physical injury  8/17/2020 1126 by Sima Carroll RN  Outcome: Ongoing  8/17/2020 0042 by Carlos Lau  Outcome: Ongoing     Problem: Skin Integrity:  Goal: Will show no infection signs and symptoms  Description: Will show no infection signs and symptoms  Outcome: Ongoing  Goal: Absence of new skin breakdown  Description: Absence of new skin breakdown  Outcome: Ongoing     Problem: Pain:  Goal: Pain level will decrease  Description: Pain level will decrease  Outcome: Ongoing  Goal: Control of acute pain  Description: Control of acute pain  Outcome: Ongoing  Goal: Control of chronic pain  Description: Control of chronic pain  Outcome: Ongoing     Problem: Gas Exchange - Impaired:  Goal: Levels of oxygenation will improve  Description: Levels of oxygenation will improve  Outcome: Ongoing     Problem: Urinary Elimination:  Goal: Signs and symptoms of infection will decrease  Description: Signs and symptoms of infection will decrease  Outcome: Ongoing  Goal: Complications related to the disease process, condition or treatment will be avoided or minimized  Description: Complications related to the disease process, condition or treatment will be avoided or minimized  Outcome: Ongoing  Goal: Skin integrity will improve  Description: Skin integrity will improve  Outcome: Ongoing  Goal: Ability to recognize the need to void and respond appropriately will improve  Description: Ability to recognize the need to void and respond appropriately will improve  Outcome: Ongoing  Goal: Will remain free from infection  Description: Will remain free from infection  Outcome: Ongoing  Goal: Incidence of incontinence will

## 2020-08-17 NOTE — PROGRESS NOTES
Nephrology Progress Note  8/17/2020 5:34 PM  Subjective:   Admit Date: 8/14/2020  PCP: Nader Rahman MD  Interval History: pt weak and confused     Diet: DIET GENERAL; Dysphagia Minced and Moist  Dietary Nutrition Supplements: Renal Oral Supplement  Pain is:None      Data:   Scheduled Meds:   magnesium sulfate  4 g Intravenous Once    lidocaine  1 patch Transdermal Daily    amantadine  100 mg Oral BID    aspirin  81 mg Oral Daily    escitalopram  10 mg Oral Nightly    rivastigmine  1.5 mg Oral BID    rosuvastatin  40 mg Oral QAM    bicalutamide  50 mg Oral Daily    sodium chloride flush  10 mL Intravenous 2 times per day    enoxaparin  30 mg Subcutaneous Daily    carbidopa-levodopa  2 tablet Oral 4x Daily    And    entacapone  200 mg Oral 4x Daily    cefTRIAXone (ROCEPHIN) IV  1 g Intravenous Daily    azithromycin  500 mg Intravenous Daily     Continuous Infusions:   sodium chloride 100 mL/hr at 08/17/20 1332     PRN Meds:potassium chloride, sodium chloride flush, acetaminophen **OR** acetaminophen, polyethylene glycol, promethazine **OR** ondansetron, morphine  I/O last 3 completed shifts: In: 36 [P.O.:720; I.V.:100]  Out: 1400 [Urine:1400]  No intake/output data recorded. Intake/Output Summary (Last 24 hours) at 8/17/2020 1734  Last data filed at 8/17/2020 1244  Gross per 24 hour   Intake 720 ml   Output 850 ml   Net -130 ml     CBC:   Recent Labs     08/15/20  0505 08/16/20  0526 08/17/20  0515   WBC 12.0* 12.3* 9.6   HGB 12.4* 11.6* 9.7*    215 184     BMP:    Recent Labs     08/15/20  0505 08/16/20  0526 08/17/20  0700    139 141   K 4.3 4.1 3.1*    104 114*   CO2 24 24 18*   BUN 23 22 17   CREATININE 2.0* 1.9* 1.2   GLUCOSE 88 79 62*     Hepatic:   Recent Labs     08/14/20  1940   AST 11*   ALT <5*   BILITOT 0.4   ALKPHOS 98     Troponin: No results for input(s): TROPONINI in the last 72 hours. BNP: No results for input(s): BNP in the last 72 hours.   Lipids: No results for input(s): CHOL, HDL in the last 72 hours. Invalid input(s): LDLCALCU  ABGs: No results found for: PHART, PO2ART, YEO8ODJ  INR:   Recent Labs     08/15/20  0505 08/16/20  0601 08/17/20  0515   INR 1.11 1.03 1.11     Renal Labs  Albumin:    Lab Results   Component Value Date    LABALBU 3.1 08/14/2020     Calcium:    Lab Results   Component Value Date    CALCIUM 6.7 08/17/2020     Phosphorus:  No results found for: PHOS  U/A:    Lab Results   Component Value Date    NITRU NEGATIVE 08/15/2020    COLORU YELLOW 08/15/2020    WBCUA 1 08/15/2020    RBCUA 6 08/15/2020    MUCUS RARE 08/15/2020    TRICHOMONAS NONE SEEN 08/15/2020    BACTERIA NEGATIVE 08/15/2020    CLARITYU CLEAR 08/15/2020    SPECGRAV 1.013 08/15/2020    UROBILINOGEN NORMAL 08/15/2020    BILIRUBINUR NEGATIVE 08/15/2020    BLOODU SMALL 08/15/2020    KETUA MODERATE 08/15/2020     ABG:  No results found for: PHART, OLE9MKE, PO2ART, BUO6BDT, BEART, THGBART, AXC2TAZ, U8QCMTHT  HgBA1c:  No results found for: LABA1C  Microalbumen/Creatinine ratio:  No components found for: RUCREAT          Objective:   Vitals: BP (!) 142/74   Pulse 85   Temp 99.5 °F (37.5 °C)   Resp 24   Ht 6' 1\" (1.854 m)   Wt 149 lb 11.1 oz (67.9 kg)   SpO2 100%   BMI 19.75 kg/m²   General appearance: awake weak confused  HEENT: Head: Normal, normocephalic, atraumatic.   Neck: supple, symmetrical, trachea midline  Lungs: diminished breath sounds bilaterally  Heart: S1, S2 normal  Abdomen: abnormal findings:  soft nt  Extremities: edema trace  Neurologic: Mental status: alertness: awake weak hard hearing      Patient Active Problem List:     Weight loss, unintentional     History of colon polyps     Pelvic mass    Assessment and Plan:      IMP:  1 arf from atn/pra/hydro  2 right side mass possible malignancy  3 hx prostate ca  4 hyponatremia  5 protein malnutrtion with constipation  6 hx scleroderma    Plan     1 renal imrpoved and replete K  2 need ct scan to identify biopsy site and dw ir and agreee as give ivf  3 fu urology  4 na stable  5 monitor bm  6 fu sclerderma ab not renal crisis  Will bc Kerr MD

## 2020-08-17 NOTE — PROGRESS NOTES
GENERAL SURGERY PROGRESS NOTE    CC/HPI:           Patient feels the same.     , I discussed the case with IR, and tomorrow Biopsies, after today's CTA. Vitals:    08/17/20 1337 08/17/20 1402 08/17/20 1704 08/17/20 1906   BP: 122/67 115/85 (!) 142/74    Pulse: 83 85 85 83   Resp: 20 22 24 27   Temp: 99.5 °F (37.5 °C)      TempSrc:       SpO2: 100% 100% 100% 100%   Weight:       Height:         I/O last 3 completed shifts: In: 36 [P.O.:720;  I.V.:100]  Out: 1400 [Urine:1400]  I/O this shift:  In: 1000 [I.V.:500; IV Piggyback:500]  Out: 1250 [Urine:1250]    DIET GENERAL; Dysphagia Minced and Moist  Dietary Nutrition Supplements: Renal Oral Supplement    Recent Results (from the past 48 hour(s))   Ammonia    Collection Time: 08/15/20  8:25 PM   Result Value Ref Range    Ammonia 22 16 - 60 UMOL/L   Basic Metabolic Panel w/ Reflex to MG    Collection Time: 08/16/20  5:26 AM   Result Value Ref Range    Sodium 139 135 - 145 MMOL/L    Potassium 4.1 3.5 - 5.1 MMOL/L    Chloride 104 99 - 110 mMol/L    CO2 24 21 - 32 MMOL/L    Anion Gap 11 4 - 16    BUN 22 6 - 23 MG/DL    CREATININE 1.9 (H) 0.9 - 1.3 MG/DL    Glucose 79 70 - 99 MG/DL    Calcium 9.1 8.3 - 10.6 MG/DL    GFR Non- 34 (L) >60 mL/min/1.73m2    GFR  41 (L) >60 mL/min/1.73m2   CBC    Collection Time: 08/16/20  5:26 AM   Result Value Ref Range    WBC 12.3 (H) 4.0 - 10.5 K/CU MM    RBC 3.89 (L) 4.6 - 6.2 M/CU MM    Hemoglobin 11.6 (L) 13.5 - 18.0 GM/DL    Hematocrit 37.7 (L) 42 - 52 %    MCV 96.9 78 - 100 FL    MCH 29.8 27 - 31 PG    MCHC 30.8 (L) 32.0 - 36.0 %    RDW 13.7 11.7 - 14.9 %    Platelets 475 331 - 110 K/CU MM    MPV 9.2 7.5 - 11.1 FL   POCT Glucose    Collection Time: 08/16/20  5:37 AM   Result Value Ref Range    POC Glucose 73 70 - 99 MG/DL   Protime-INR    Collection Time: 08/16/20  6:01 AM   Result Value Ref Range    Protime 12.5 11.7 - 14.5 SECONDS    INR 1.03 INDEX   Fibrinogen    Collection Time: 08/16/20 6:01 AM   Result Value Ref Range    Fibrinogen 813 (H) 196.9 - 442.1 MG/DL   Lactate Dehydrogenase    Collection Time: 08/16/20  6:01 AM   Result Value Ref Range     120 - 246 IU/L   Ferritin    Collection Time: 08/16/20  6:01 AM   Result Value Ref Range    Ferritin 625 (H) 30 - 400 NG/ML   D-Dimer, Quantitative    Collection Time: 08/16/20  6:01 AM   Result Value Ref Range    D-Dimer, Quant 2214 (H) <230 NG/mL(DDU)   Procalcitonin    Collection Time: 08/16/20  6:01 AM   Result Value Ref Range    Procalcitonin 0.423    C-Reactive Protein    Collection Time: 08/16/20  6:01 AM   Result Value Ref Range    CRP, High Sensitivity >300.0 mg/L   Legionella antigen, urine    Collection Time: 08/16/20  9:15 AM    Specimen: Urine   Result Value Ref Range    Legionella Urinary Ag NEGATIVE NEGATIVE   Strep Pneumoniae Antigen    Collection Time: 08/16/20  9:15 AM    Specimen: CSF   Result Value Ref Range    Strep pneumo Ag URINE NEGATIVE    Respiratory Disease Panel PCR    Collection Time: 08/16/20  9:15 AM    Specimen: Nasopharyngeal; Blood   Result Value Ref Range    Adenovirus Detection by PCR NOT DETECTED NOT DETECTED    Coronavirus 229E PCR NOT DETECTED NOT DETECTED    Coronavirus HKU1 PCR NOT DETECTED NOT DETECTED    Coronavirus NL63 PCR NOT DETECTED NOT DETECTED    Coronavirus OC43 PCR NOT DETECTED NOT DETECTED    Human Metapneumovirus PCR NOT DETECTED NOT DETECTED    Rhinovirus Enterovirus PCR NOT DETECTED NOT DETECTED    Influenza A by PCR NOT DETECTED NOT DETECTED    Influenza A H1 (2009) PCR NOT DETECTED NOT DETECTED    Influenza A H1 Pandemic PCR NOT DETECTED NOT DETECTED    Influenza A H3 PCR NOT DETECTED NOT DETECTED    Influenza B by PCR NOT DETECTED NOT DETECTED    Parainfluenza 1 PCR NOT DETECTED NOT DETECTED    Parainfluenza 2 PCR NOT DETECTED NOT DETECTED    Parainfluenza 3 PCR NOT DETECTED NOT DETECTED    Parainfluenza 4 PCR NOT DETECTED NOT DETECTED    RSV PCR NOT DETECTED NOT DETECTED    Bordetella parapertussis by PCR NOT DETECTED NOT DETECTED    B Pertussis by PCR NOT DETECTED NOT DETECTED    Chlamydophila Pneumonia PCR NOT DETECTED NOT DETECTED    Mycoplasma pneumo by PCR NOT DETECTED NOT DETECTED   Covid-19 Ambulatory    Collection Time: 08/16/20  9:15 AM   Result Value Ref Range    Source VIRAL SWAB     SARS-CoV-2 NOT DETECTED NOT DETECTED   TYPE AND SCREEN    Collection Time: 08/16/20 10:02 AM   Result Value Ref Range    ABO/Rh O NEGATIVE     Antibody Screen NEGATIVE    Protime-INR    Collection Time: 08/17/20  5:15 AM   Result Value Ref Range    Protime 13.5 11.7 - 14.5 SECONDS    INR 1.11 INDEX   APTT    Collection Time: 08/17/20  5:15 AM   Result Value Ref Range    aPTT 41.6 (H) 25.1 - 37.1 SECONDS   Fibrinogen    Collection Time: 08/17/20  5:15 AM   Result Value Ref Range    Fibrinogen 640 (H) 196.9 - 442.1 MG/DL   D-Dimer, Quantitative    Collection Time: 08/17/20  5:15 AM   Result Value Ref Range    D-Dimer, Quant 1440 (H) <230 NG/mL(DDU)   CBC Auto Differential    Collection Time: 08/17/20  5:15 AM   Result Value Ref Range    WBC 9.6 4.0 - 10.5 K/CU MM    RBC 3.18 (L) 4.6 - 6.2 M/CU MM    Hemoglobin 9.7 (L) 13.5 - 18.0 GM/DL    Hematocrit 30.8 (L) 42 - 52 %    MCV 96.9 78 - 100 FL    MCH 30.5 27 - 31 PG    MCHC 31.5 (L) 32.0 - 36.0 %    RDW 13.7 11.7 - 14.9 %    Platelets 287 310 - 922 K/CU MM    MPV 9.3 7.5 - 11.1 FL    Differential Type AUTOMATED DIFFERENTIAL     Segs Relative 86.7 (H) 36 - 66 %    Lymphocytes % 5.4 (L) 24 - 44 %    Monocytes % 6.5 (H) 0 - 4 %    Eosinophils % 0.7 0 - 3 %    Basophils % 0.1 0 - 1 %    Segs Absolute 8.3 K/CU MM    Lymphocytes Absolute 0.5 K/CU MM    Monocytes Absolute 0.6 K/CU MM    Eosinophils Absolute 0.1 K/CU MM    Basophils Absolute 0.0 K/CU MM    Nucleated RBC % 0.0 %    Total Nucleated RBC 0.0 K/CU MM    Total Immature Neutrophil 0.06 K/CU MM    Immature Neutrophil % 0.6 (H) 0 - 0.43 %   SPECIMEN REJECTION    Collection Time: 08/17/20  5:15 AM   Result hydronephrosis due to compression of the ureter by the mass.         Bibasilar pulmonary consolidation or atelectasis.       Diagnosis:      Patient Active Problem List   Diagnosis    Weight loss, unintentional    History of colon polyps    Pelvic mass        Assessment & plan:  Oswald Parish is a very pleasant 79 y.o. male presenting with a pelvic mass.     Pelvic mass? Fluid / hematoma? Noted, on CT percutaneous biopsy I recommend first, and then I'll manage according to its result.     Waiting for IR to proceed with biopsies, definitely tomorrow, Tuesday, after I discussed the case with IR, and will FIRST obtain CTA with urogram today.    ___________________________________________    Sofia Brar MD, FACS, FICS  8/17/2020  7:19 PM

## 2020-08-18 ENCOUNTER — APPOINTMENT (OUTPATIENT)
Dept: CT IMAGING | Age: 80
DRG: 371 | End: 2020-08-18
Payer: MEDICARE

## 2020-08-18 LAB
ANION GAP SERPL CALCULATED.3IONS-SCNC: 11 MMOL/L (ref 4–16)
APTT: 44.9 SECONDS (ref 25.1–37.1)
BASOPHILS ABSOLUTE: 0.1 K/CU MM
BASOPHILS RELATIVE PERCENT: 0.4 % (ref 0–1)
BUN BLDV-MCNC: 22 MG/DL (ref 6–23)
CALCIUM SERPL-MCNC: 8.8 MG/DL (ref 8.3–10.6)
CHLORIDE BLD-SCNC: 103 MMOL/L (ref 99–110)
CO2: 23 MMOL/L (ref 21–32)
CREAT SERPL-MCNC: 1.4 MG/DL (ref 0.9–1.3)
D DIMER: 1459 NG/ML(DDU)
DIFFERENTIAL TYPE: ABNORMAL
EOSINOPHILS ABSOLUTE: 0.1 K/CU MM
EOSINOPHILS RELATIVE PERCENT: 0.4 % (ref 0–3)
FIBRINOGEN LEVEL: 705 MG/DL (ref 196.9–442.1)
GFR AFRICAN AMERICAN: 59 ML/MIN/1.73M2
GFR NON-AFRICAN AMERICAN: 49 ML/MIN/1.73M2
GLUCOSE BLD-MCNC: 95 MG/DL (ref 70–99)
HCT VFR BLD CALC: 35.4 % (ref 42–52)
HEMOGLOBIN: 11.2 GM/DL (ref 13.5–18)
HIGH SENSITIVE C-REACTIVE PROTEIN: 206.3 MG/L
IMMATURE NEUTROPHIL %: 0.7 % (ref 0–0.43)
INR BLD: 1.06 INDEX
LYMPHOCYTES ABSOLUTE: 0.7 K/CU MM
LYMPHOCYTES RELATIVE PERCENT: 5.5 % (ref 24–44)
MAGNESIUM: 2.3 MG/DL (ref 1.8–2.4)
MCH RBC QN AUTO: 29.9 PG (ref 27–31)
MCHC RBC AUTO-ENTMCNC: 31.6 % (ref 32–36)
MCV RBC AUTO: 94.4 FL (ref 78–100)
MONOCYTES ABSOLUTE: 0.7 K/CU MM
MONOCYTES RELATIVE PERCENT: 6.1 % (ref 0–4)
NUCLEATED RBC %: 0 %
PDW BLD-RTO: 13.6 % (ref 11.7–14.9)
PLATELET # BLD: 262 K/CU MM (ref 140–440)
PMV BLD AUTO: 9.4 FL (ref 7.5–11.1)
POTASSIUM SERPL-SCNC: 4.2 MMOL/L (ref 3.5–5.1)
PROCALCITONIN: 0.3
PROTHROMBIN TIME: 12.8 SECONDS (ref 11.7–14.5)
RBC # BLD: 3.75 M/CU MM (ref 4.6–6.2)
SEGMENTED NEUTROPHILS ABSOLUTE COUNT: 10.4 K/CU MM
SEGMENTED NEUTROPHILS RELATIVE PERCENT: 86.9 % (ref 36–66)
SODIUM BLD-SCNC: 137 MMOL/L (ref 135–145)
TOTAL IMMATURE NEUTOROPHIL: 0.08 K/CU MM
TOTAL NUCLEATED RBC: 0 K/CU MM
WBC # BLD: 11.9 K/CU MM (ref 4–10.5)

## 2020-08-18 PROCEDURE — 6360000002 HC RX W HCPCS: Performed by: INTERNAL MEDICINE

## 2020-08-18 PROCEDURE — 6370000000 HC RX 637 (ALT 250 FOR IP): Performed by: HOSPITALIST

## 2020-08-18 PROCEDURE — 94761 N-INVAS EAR/PLS OXIMETRY MLT: CPT

## 2020-08-18 PROCEDURE — 6370000000 HC RX 637 (ALT 250 FOR IP): Performed by: INTERNAL MEDICINE

## 2020-08-18 PROCEDURE — 2580000003 HC RX 258: Performed by: INTERNAL MEDICINE

## 2020-08-18 PROCEDURE — 1200000000 HC SEMI PRIVATE

## 2020-08-18 PROCEDURE — 74178 CT ABD&PLV WO CNTR FLWD CNTR: CPT

## 2020-08-18 PROCEDURE — 86141 C-REACTIVE PROTEIN HS: CPT

## 2020-08-18 PROCEDURE — 85730 THROMBOPLASTIN TIME PARTIAL: CPT

## 2020-08-18 PROCEDURE — 83735 ASSAY OF MAGNESIUM: CPT

## 2020-08-18 PROCEDURE — 6360000002 HC RX W HCPCS: Performed by: HOSPITALIST

## 2020-08-18 PROCEDURE — 99232 SBSQ HOSP IP/OBS MODERATE 35: CPT | Performed by: SURGERY

## 2020-08-18 PROCEDURE — 80048 BASIC METABOLIC PNL TOTAL CA: CPT

## 2020-08-18 PROCEDURE — 2580000003 HC RX 258: Performed by: HOSPITALIST

## 2020-08-18 PROCEDURE — 84145 PROCALCITONIN (PCT): CPT

## 2020-08-18 PROCEDURE — 6360000004 HC RX CONTRAST MEDICATION: Performed by: SURGERY

## 2020-08-18 PROCEDURE — 85610 PROTHROMBIN TIME: CPT

## 2020-08-18 PROCEDURE — 85384 FIBRINOGEN ACTIVITY: CPT

## 2020-08-18 PROCEDURE — 85025 COMPLETE CBC W/AUTO DIFF WBC: CPT

## 2020-08-18 PROCEDURE — 6360000002 HC RX W HCPCS: Performed by: NURSE PRACTITIONER

## 2020-08-18 PROCEDURE — 36415 COLL VENOUS BLD VENIPUNCTURE: CPT

## 2020-08-18 PROCEDURE — 85379 FIBRIN DEGRADATION QUANT: CPT

## 2020-08-18 RX ADMIN — IOPAMIDOL 120 ML: 755 INJECTION, SOLUTION INTRAVENOUS at 09:43

## 2020-08-18 RX ADMIN — ASPIRIN 81 MG CHEWABLE TABLET 81 MG: 81 TABLET CHEWABLE at 12:05

## 2020-08-18 RX ADMIN — ROSUVASTATIN 40 MG: 40 TABLET, FILM COATED ORAL at 12:05

## 2020-08-18 RX ADMIN — RIVASTIGMINE TARTRATE 1.5 MG: 1.5 CAPSULE ORAL at 13:03

## 2020-08-18 RX ADMIN — CARBIDOPA AND LEVODOPA 2 TABLET: 25; 100 TABLET ORAL at 16:48

## 2020-08-18 RX ADMIN — MORPHINE SULFATE 2 MG: 2 INJECTION, SOLUTION INTRAMUSCULAR; INTRAVENOUS at 21:35

## 2020-08-18 RX ADMIN — AMANTADINE HYDROCHLORIDE 100 MG: 100 CAPSULE, LIQUID FILLED ORAL at 21:31

## 2020-08-18 RX ADMIN — AZITHROMYCIN MONOHYDRATE 500 MG: 500 INJECTION, POWDER, LYOPHILIZED, FOR SOLUTION INTRAVENOUS at 13:02

## 2020-08-18 RX ADMIN — CARBIDOPA AND LEVODOPA 2 TABLET: 25; 100 TABLET ORAL at 12:05

## 2020-08-18 RX ADMIN — CEFTRIAXONE 1 G: 1 INJECTION, POWDER, FOR SOLUTION INTRAMUSCULAR; INTRAVENOUS at 09:05

## 2020-08-18 RX ADMIN — AMANTADINE HYDROCHLORIDE 100 MG: 100 CAPSULE, LIQUID FILLED ORAL at 12:05

## 2020-08-18 RX ADMIN — ENTACAPONE 200 MG: 200 TABLET, FILM COATED ORAL at 16:48

## 2020-08-18 RX ADMIN — ENTACAPONE 200 MG: 200 TABLET, FILM COATED ORAL at 13:03

## 2020-08-18 RX ADMIN — ENOXAPARIN SODIUM 30 MG: 30 INJECTION SUBCUTANEOUS at 09:06

## 2020-08-18 RX ADMIN — CARBIDOPA AND LEVODOPA 2 TABLET: 25; 100 TABLET ORAL at 21:31

## 2020-08-18 RX ADMIN — ESCITALOPRAM OXALATE 10 MG: 10 TABLET ORAL at 21:31

## 2020-08-18 RX ADMIN — RIVASTIGMINE TARTRATE 1.5 MG: 1.5 CAPSULE ORAL at 21:36

## 2020-08-18 RX ADMIN — ENTACAPONE 200 MG: 200 TABLET, FILM COATED ORAL at 21:31

## 2020-08-18 RX ADMIN — SODIUM CHLORIDE: 9 INJECTION, SOLUTION INTRAVENOUS at 13:02

## 2020-08-18 ASSESSMENT — PAIN DESCRIPTION - LOCATION: LOCATION: GENERALIZED

## 2020-08-18 ASSESSMENT — PAIN - FUNCTIONAL ASSESSMENT: PAIN_FUNCTIONAL_ASSESSMENT: ACTIVITIES ARE NOT PREVENTED

## 2020-08-18 ASSESSMENT — PAIN DESCRIPTION - PAIN TYPE: TYPE: OTHER (COMMENT)

## 2020-08-18 ASSESSMENT — PAIN DESCRIPTION - PROGRESSION: CLINICAL_PROGRESSION: NOT CHANGED

## 2020-08-18 ASSESSMENT — PAIN SCALES - WONG BAKER: WONGBAKER_NUMERICALRESPONSE: 8

## 2020-08-18 ASSESSMENT — PAIN SCALES - GENERAL: PAINLEVEL_OUTOF10: 0

## 2020-08-18 ASSESSMENT — PAIN DESCRIPTION - ONSET: ONSET: AWAKENED FROM SLEEP

## 2020-08-18 ASSESSMENT — PAIN DESCRIPTION - FREQUENCY: FREQUENCY: CONTINUOUS

## 2020-08-18 ASSESSMENT — PAIN DESCRIPTION - DESCRIPTORS: DESCRIPTORS: CRAMPING;SPASM

## 2020-08-18 NOTE — PROGRESS NOTES
Nephrology Progress Note  8/18/2020 8:54 AM  Subjective:   Admit Date: 8/14/2020  PCP: Macario Cushing, MD     Interval History:  Anticipated CTA of abdomen / pelvis  As well as percutaneous biopsy by IR of right renal mass. Diet: DIET GENERAL; Dysphagia Minced and Moist  Dietary Nutrition Supplements: Renal Oral Supplement    Data:   Scheduled Meds:   lidocaine  1 patch Transdermal Daily    amantadine  100 mg Oral BID    aspirin  81 mg Oral Daily    escitalopram  10 mg Oral Nightly    rivastigmine  1.5 mg Oral BID    rosuvastatin  40 mg Oral QAM    bicalutamide  50 mg Oral Daily    sodium chloride flush  10 mL Intravenous 2 times per day    enoxaparin  30 mg Subcutaneous Daily    carbidopa-levodopa  2 tablet Oral 4x Daily    And    entacapone  200 mg Oral 4x Daily    cefTRIAXone (ROCEPHIN) IV  1 g Intravenous Daily    azithromycin  500 mg Intravenous Daily     Continuous Infusions:   sodium chloride 100 mL/hr at 08/18/20 0854     PRN Meds:potassium chloride, sodium chloride flush, acetaminophen **OR** acetaminophen, polyethylene glycol, promethazine **OR** ondansetron, morphine  I/O last 3 completed shifts: In: 1970 [P.O.:970; I.V.:500; IV Piggyback:500]  Out: 2050 [Urine:2050]  No intake/output data recorded. Intake/Output Summary (Last 24 hours) at 8/18/2020 0854  Last data filed at 8/18/2020 0655  Gross per 24 hour   Intake 1970 ml   Output 2050 ml   Net -80 ml     CBC:   Recent Labs     08/16/20  0526 08/17/20  0515 08/18/20  0422   WBC 12.3* 9.6 11.9*   HGB 11.6* 9.7* 11.2*    184 262     BMP:    Recent Labs     08/16/20  0526 08/17/20  0700 08/17/20 2020    141  --    K 4.1 3.1* 4.3    114*  --    CO2 24 18*  --    BUN 22 17  --    CREATININE 1.9* 1.2  --    GLUCOSE 79 62*  --      Hepatic:   No results for input(s): AST, ALT, ALB, BILITOT, ALKPHOS in the last 72 hours. Troponin: No results for input(s): TROPONINI in the last 72 hours.   BNP: No results for input(s): BNP in the last 72 hours. Lipids: No results for input(s): CHOL, HDL in the last 72 hours. Invalid input(s): LDLCALCU  ABGs: No results found for: PHART, PO2ART, GLA1BOS  INR:   Recent Labs     08/16/20  0601 08/17/20  0515 08/18/20  0422   INR 1.03 1.11 1.06     Renal Labs  Albumin:    Lab Results   Component Value Date    LABALBU 3.1 08/14/2020     Calcium:    Lab Results   Component Value Date    CALCIUM 6.7 08/17/2020     Phosphorus:  No results found for: PHOS  U/A:    Lab Results   Component Value Date    NITRU NEGATIVE 08/15/2020    COLORU YELLOW 08/15/2020    WBCUA 1 08/15/2020    RBCUA 6 08/15/2020    MUCUS RARE 08/15/2020    TRICHOMONAS NONE SEEN 08/15/2020    BACTERIA NEGATIVE 08/15/2020    CLARITYU CLEAR 08/15/2020    SPECGRAV 1.013 08/15/2020    UROBILINOGEN NORMAL 08/15/2020    BILIRUBINUR NEGATIVE 08/15/2020    BLOODU SMALL 08/15/2020    KETUA MODERATE 08/15/2020     ABG:  No results found for: PHART, CEO5QFY, PO2ART, LGM1OOY, BEART, THGBART, VYA1QVR, V8RHTURW  HgBA1c:  No results found for: LABA1C  Microalbumen/Creatinine ratio:  No components found for: RUCREAT    Objective:   Vitals: BP (!) 177/105   Pulse 90   Temp 99.7 °F (37.6 °C) (Oral)   Resp 16   Ht 6' 1\" (1.854 m)   Wt 152 lb 8.9 oz (69.2 kg)   SpO2 92%   BMI 20.13 kg/m²      General appearance:  awake and verbally interactive; confused   HEENT: Head: normocephalic, atraumatic.   Neck: supple, symmetrical, trachea midline  Cardiovascular: normal S1 and S2  Pulmonary: diminished lung sounds bilaterally   Abdomen:  soft / non-tender   Extremities: Trace edema to bilateral lower legs     Patient Active Problem List:     Weight loss, unintentional     History of colon polyps     Pelvic mass    Assessment and Plan:    IMP:  1 arf from atn/pra/hydro  2 right side mass possible malignancy  3 hx prostate ca  4 hyponatremia  5 protein malnutrtion with constipation  6 hx scleroderma    Plan   1.    -uop: 2 liters in the last 24 hours  -in context to anticipated contrast exposure: avoid the use of:   ACEI / ARB / NSAID's and Diuretics at the present time.     -chemistry results from this am are pending   2.   -followed by Dr. Marlene Reynoso: anticipate CTA of abdomen / pelvis   -referral submitted to IR for percutaneous biopsy of the right pelvic mass   3.   -follow-up with Urology on outpatient basis   4.   -chemistry results from this am are pending   5   -anti-scleroderma antibody; in process   -no overt findings suggestive of renal crisis     I spoke with lab this morning in regard to pending chemistry results  This morning from 4 am; I explained that they show they are still in-process. I asked them if they could process and post the results to Epic. Electronically signed by SURENDRA Moffett - CNP                Nephrology Attending Progress Note  8/18/2020 4:50 PM  Subjective:   Admit Date: 8/14/2020  PCP: Marie Flannery MD    Interval History: I have personally performed face to face diagnostic evaluation on this patient. I have personally reviewed pertinent labs and imaging and agree with the care plan above. My additional findings are as follows:   Pt weak and confused in bed this am    Objective:   Vitals: /75   Pulse 85   Temp 99 °F (37.2 °C) (Axillary)   Resp 16   Ht 6' 1\" (1.854 m)   Wt 152 lb 8.9 oz (69.2 kg)   SpO2 93%   BMI 20.13 kg/m²   Weak anxious  Decrease bs  No edema    Assessment and Plan:  IMP:  As stated above    Plan     1 sp ct urogram and monitor renal function  2 renal stable at this time  3 possible rupture appendix and fu dr Valadez Zohaib rec  4 fu urology rec  Monitor affect           Electronically signed by Jyothi Car MD on 8/18/2020 at 4:50 PM

## 2020-08-18 NOTE — PROGRESS NOTES
GENERAL SURGERY PROGRESS NOTE    CC/HPI:           Patient feels the same.     , I discussed the case with IR, and today's Biopsies, after CTA. Vitals:    08/17/20 1906 08/17/20 2000 08/17/20 2354 08/18/20 0200   BP:  (!) 156/84 (!) 158/99 (!) 165/99   Pulse: 83 84 78 78   Resp: 27 19 (!) 31 18   Temp:  98.2 °F (36.8 °C) 97.6 °F (36.4 °C) 98.7 °F (37.1 °C)   TempSrc:  Oral Oral Oral   SpO2: 100%  100% 97%   Weight:   152 lb 8.9 oz (69.2 kg)    Height:         I/O last 3 completed shifts: In: 4283 [P.O.:720; I.V.:500; IV Piggyback:500]  Out: 1750 [Urine:1750]  No intake/output data recorded.     DIET GENERAL; Dysphagia Minced and Moist  Dietary Nutrition Supplements: Renal Oral Supplement    Recent Results (from the past 48 hour(s))   Legionella antigen, urine    Collection Time: 08/16/20  9:15 AM    Specimen: Urine   Result Value Ref Range    Legionella Urinary Ag NEGATIVE NEGATIVE   Strep Pneumoniae Antigen    Collection Time: 08/16/20  9:15 AM    Specimen: CSF   Result Value Ref Range    Strep pneumo Ag URINE NEGATIVE    Respiratory Disease Panel PCR    Collection Time: 08/16/20  9:15 AM    Specimen: Nasopharyngeal; Blood   Result Value Ref Range    Adenovirus Detection by PCR NOT DETECTED NOT DETECTED    Coronavirus 229E PCR NOT DETECTED NOT DETECTED    Coronavirus HKU1 PCR NOT DETECTED NOT DETECTED    Coronavirus NL63 PCR NOT DETECTED NOT DETECTED    Coronavirus OC43 PCR NOT DETECTED NOT DETECTED    Human Metapneumovirus PCR NOT DETECTED NOT DETECTED    Rhinovirus Enterovirus PCR NOT DETECTED NOT DETECTED    Influenza A by PCR NOT DETECTED NOT DETECTED    Influenza A H1 (2009) PCR NOT DETECTED NOT DETECTED    Influenza A H1 Pandemic PCR NOT DETECTED NOT DETECTED    Influenza A H3 PCR NOT DETECTED NOT DETECTED    Influenza B by PCR NOT DETECTED NOT DETECTED    Parainfluenza 1 PCR NOT DETECTED NOT DETECTED    Parainfluenza 2 PCR NOT DETECTED NOT DETECTED    Parainfluenza 3 PCR NOT DETECTED NOT DETECTED Parainfluenza 4 PCR NOT DETECTED NOT DETECTED    RSV PCR NOT DETECTED NOT DETECTED    Bordetella parapertussis by PCR NOT DETECTED NOT DETECTED    B Pertussis by PCR NOT DETECTED NOT DETECTED    Chlamydophila Pneumonia PCR NOT DETECTED NOT DETECTED    Mycoplasma pneumo by PCR NOT DETECTED NOT DETECTED   Covid-19 Ambulatory    Collection Time: 08/16/20  9:15 AM   Result Value Ref Range    Source VIRAL SWAB     SARS-CoV-2 NOT DETECTED NOT DETECTED   TYPE AND SCREEN    Collection Time: 08/16/20 10:02 AM   Result Value Ref Range    ABO/Rh O NEGATIVE     Antibody Screen NEGATIVE    Protime-INR    Collection Time: 08/17/20  5:15 AM   Result Value Ref Range    Protime 13.5 11.7 - 14.5 SECONDS    INR 1.11 INDEX   APTT    Collection Time: 08/17/20  5:15 AM   Result Value Ref Range    aPTT 41.6 (H) 25.1 - 37.1 SECONDS   Fibrinogen    Collection Time: 08/17/20  5:15 AM   Result Value Ref Range    Fibrinogen 640 (H) 196.9 - 442.1 MG/DL   D-Dimer, Quantitative    Collection Time: 08/17/20  5:15 AM   Result Value Ref Range    D-Dimer, Quant 1440 (H) <230 NG/mL(DDU)   CBC Auto Differential    Collection Time: 08/17/20  5:15 AM   Result Value Ref Range    WBC 9.6 4.0 - 10.5 K/CU MM    RBC 3.18 (L) 4.6 - 6.2 M/CU MM    Hemoglobin 9.7 (L) 13.5 - 18.0 GM/DL    Hematocrit 30.8 (L) 42 - 52 %    MCV 96.9 78 - 100 FL    MCH 30.5 27 - 31 PG    MCHC 31.5 (L) 32.0 - 36.0 %    RDW 13.7 11.7 - 14.9 %    Platelets 073 269 - 260 K/CU MM    MPV 9.3 7.5 - 11.1 FL    Differential Type AUTOMATED DIFFERENTIAL     Segs Relative 86.7 (H) 36 - 66 %    Lymphocytes % 5.4 (L) 24 - 44 %    Monocytes % 6.5 (H) 0 - 4 %    Eosinophils % 0.7 0 - 3 %    Basophils % 0.1 0 - 1 %    Segs Absolute 8.3 K/CU MM    Lymphocytes Absolute 0.5 K/CU MM    Monocytes Absolute 0.6 K/CU MM    Eosinophils Absolute 0.1 K/CU MM    Basophils Absolute 0.0 K/CU MM    Nucleated RBC % 0.0 %    Total Nucleated RBC 0.0 K/CU MM    Total Immature Neutrophil 0.06 K/CU MM    Immature Neutrophil % 0.6 (H) 0 - 0.43 %   SPECIMEN REJECTION    Collection Time: 08/17/20  5:15 AM   Result Value Ref Range    Rejected Test BMPX     Reason for Rejection UNABLE TO PERFORM TESTING:    Basic Metabolic Panel w/ Reflex to MG    Collection Time: 08/17/20  7:00 AM   Result Value Ref Range    Sodium 141 135 - 145 MMOL/L    Potassium 3.1 (L) 3.5 - 5.1 MMOL/L    Chloride 114 (H) 99 - 110 mMol/L    CO2 18 (L) 21 - 32 MMOL/L    Anion Gap 9 4 - 16    BUN 17 6 - 23 MG/DL    CREATININE 1.2 0.9 - 1.3 MG/DL    Glucose 62 (L) 70 - 99 MG/DL    Calcium 6.7 (LL) 8.3 - 10.6 MG/DL    GFR Non- 58 (L) >60 mL/min/1.73m2    GFR African American >60 >60 mL/min/1.73m2   Magnesium    Collection Time: 08/17/20  7:00 AM   Result Value Ref Range    Magnesium 1.5 (L) 1.8 - 2.4 mg/dl   Potassium    Collection Time: 08/17/20  8:20 PM   Result Value Ref Range    Potassium 4.3 3.5 - 5.1 MMOL/L   CBC Auto Differential    Collection Time: 08/18/20  4:22 AM   Result Value Ref Range    WBC 11.9 (H) 4.0 - 10.5 K/CU MM    RBC 3.75 (L) 4.6 - 6.2 M/CU MM    Hemoglobin 11.2 (L) 13.5 - 18.0 GM/DL    Hematocrit 35.4 (L) 42 - 52 %    MCV 94.4 78 - 100 FL    MCH 29.9 27 - 31 PG    MCHC 31.6 (L) 32.0 - 36.0 %    RDW 13.6 11.7 - 14.9 %    Platelets 453 603 - 704 K/CU MM    MPV 9.4 7.5 - 11.1 FL    Differential Type AUTOMATED DIFFERENTIAL     Segs Relative 86.9 (H) 36 - 66 %    Lymphocytes % 5.5 (L) 24 - 44 %    Monocytes % 6.1 (H) 0 - 4 %    Eosinophils % 0.4 0 - 3 %    Basophils % 0.4 0 - 1 %    Segs Absolute 10.4 K/CU MM    Lymphocytes Absolute 0.7 K/CU MM    Monocytes Absolute 0.7 K/CU MM    Eosinophils Absolute 0.1 K/CU MM    Basophils Absolute 0.1 K/CU MM    Nucleated RBC % 0.0 %    Total Nucleated RBC 0.0 K/CU MM    Total Immature Neutrophil 0.08 K/CU MM    Immature Neutrophil % 0.7 (H) 0 - 0.43 %       Scheduled Meds:   lidocaine  1 patch Transdermal Daily    amantadine  100 mg Oral BID    aspirin  81 mg Oral Daily    escitalopram 10 mg Oral Nightly    rivastigmine  1.5 mg Oral BID    rosuvastatin  40 mg Oral QAM    bicalutamide  50 mg Oral Daily    sodium chloride flush  10 mL Intravenous 2 times per day    enoxaparin  30 mg Subcutaneous Daily    carbidopa-levodopa  2 tablet Oral 4x Daily    And    entacapone  200 mg Oral 4x Daily    cefTRIAXone (ROCEPHIN) IV  1 g Intravenous Daily    azithromycin  500 mg Intravenous Daily       Continuous Infusions:   sodium chloride 100 mL/hr at 08/17/20 1332       Physical Exam:  HEENT: Anicteric sclerae, Oropharyngeal mucosae moist, pink and intact. Heart:  Normal S1 and S2, RRR  Lungs: Clear to auscultation bilaterally, No audible Wheezes or Rales. Extremities: No edema. Neuro: Alert and Oriented x 3, Non focal.  Abdomen: Soft, Benign, not MILDLY tender lower abdomen. ., Non distended, Positive bowel sounds. Active Problems:    Pelvic mass  Resolved Problems:    * No resolved hospital problems. *      Assessment and Plan:  CT yesterday:  Impression    Right pelvic mass.  Differential diagnosis includes subacute hematoma or    infiltrative malignancy.         Mild right hydronephrosis due to compression of the ureter by the mass.         Bibasilar pulmonary consolidation or atelectasis.       Diagnosis:      Patient Active Problem List   Diagnosis    Weight loss, unintentional    History of colon polyps    Pelvic mass        Assessment & plan:  Eileen Santoro is a very pleasant 79 y.o. male presenting with a pelvic mass.     Pelvic mass? Fluid / hematoma? Noted, on CT percutaneous biopsy I recommend first, and then I'll manage according to its result.     Waiting for IR to proceed with biopsies, probably today or tomorrow, after I discussed the case with IR, and will FIRST obtain CTA with urogram today.    ___________________________________________    Ernst Maurer MD, FACS, FICS  8/18/2020  6:11 AM

## 2020-08-18 NOTE — CARE COORDINATION
TC to pt's spouse to initiate discharge planning. Pt lives at home with spouse, who is his primary caregiver. She states pt has wheelchair, walker, rails in bathroom. Pt often sleeps in lift chair but she is open to getting hospital bed if needed in the future. Pt has had HHC previously but she did not find it helpful as insurance at the time only approved 4 visits. Pt has different insurance this time so she is willing to try again. Discussed options that service her area and she states she will ask her friend who she would recommend as she is a retired RN. Provided my contact info for spouse to follow up with me on [facility].

## 2020-08-18 NOTE — PROGRESS NOTES
Hospitalist Progress Note      Name:  Marry Cohn /Age/Sex: 1940  (79 y.o. male)   MRN & CSN:  [id number] & [id number] Admission Date/Time: 2020  7:25 PM   Location:  [address] [place] [address] PCP: August Baker MD         Hospital Day: 5    Assessment and Plan:   Marry Cohn is a 79 y.o.  male  who presents with      1) Abd pain due to pelvic mass with right sided hydronephrosis  -CT A/P: Rt pelvic mass with mild Rt hydronephrosis  -Urology and General Surgery on board; for CT Urogram  -IR consulted for biopsy        2) Acute Resp distress with hypoxia 2/2 possible PNA   -CXR: Bilateral symmetric interstitial opacities  -Concerning for covid  -COVID 19 negative   -CRP significantly elevated; will trend  -Continue IV abx     3) SLIM  -Cr 1.7--1.4  -Nephrology on board     Other chronic medical conditions; medication resumed unless contraindicated     -Mod PCM  -Prostate cancer /sp prostatectomy   -Dementia parkisons       Diet DIET GENERAL; Dysphagia Minced and Moist  Dietary Nutrition Supplements: Renal Oral Supplement   DVT Prophylaxis [] Lovenox, []  Heparin, [] SCDs, [] Ambulation   GI Prophylaxis [] PPI,  [] H2 Blocker,  [] Carafate,  [] Diet/Tube Feeds   Code Status Full Code   Disposition TBD   MDM      History of Present Illness:     Patient was seen and examined  Denied any worsening abdominal pain  Pleasantly confused but easily redirectable  No acute event overnight       Ten point ROS reviewed negative, unless as noted above    Objective: Intake/Output Summary (Last 24 hours) at 2020 1012  Last data filed at 2020 0655  Gross per 24 hour   Intake 1730 ml   Output 2050 ml   Net -320 ml      Vitals:   Vitals:    20 0851   BP: (!) 177/105   Pulse: 90   Resp: 16   Temp: 99.7 °F (37.6 °C)   SpO2: 92%     Physical Exam:   GEN Awake male, laying in bed in no apparent distress. Appears given age. EYES Pupils are equally round.   No scleral erythema, discharge, or

## 2020-08-19 ENCOUNTER — APPOINTMENT (OUTPATIENT)
Dept: CT IMAGING | Age: 80
DRG: 371 | End: 2020-08-19
Payer: MEDICARE

## 2020-08-19 LAB
ANION GAP SERPL CALCULATED.3IONS-SCNC: 11 MMOL/L (ref 4–16)
APTT: 41.1 SECONDS (ref 25.1–37.1)
BASOPHILS ABSOLUTE: 0 K/CU MM
BASOPHILS RELATIVE PERCENT: 0.2 % (ref 0–1)
BUN BLDV-MCNC: 19 MG/DL (ref 6–23)
CALCIUM SERPL-MCNC: 8.7 MG/DL (ref 8.3–10.6)
CHLORIDE BLD-SCNC: 105 MMOL/L (ref 99–110)
CO2: 21 MMOL/L (ref 21–32)
CREAT SERPL-MCNC: 1.3 MG/DL (ref 0.9–1.3)
D DIMER: 1253 NG/ML(DDU)
DIFFERENTIAL TYPE: ABNORMAL
EOSINOPHILS ABSOLUTE: 0 K/CU MM
EOSINOPHILS RELATIVE PERCENT: 0.2 % (ref 0–3)
FIBRINOGEN LEVEL: 658 MG/DL (ref 196.9–442.1)
GFR AFRICAN AMERICAN: >60 ML/MIN/1.73M2
GFR NON-AFRICAN AMERICAN: 53 ML/MIN/1.73M2
GLUCOSE BLD-MCNC: 94 MG/DL (ref 70–99)
HCT VFR BLD CALC: 34 % (ref 42–52)
HEMOGLOBIN: 10.7 GM/DL (ref 13.5–18)
IMMATURE NEUTROPHIL %: 0.7 % (ref 0–0.43)
INR BLD: 1.21 INDEX
LYMPHOCYTES ABSOLUTE: 0.7 K/CU MM
LYMPHOCYTES RELATIVE PERCENT: 5.5 % (ref 24–44)
MAGNESIUM: 2 MG/DL (ref 1.8–2.4)
MCH RBC QN AUTO: 30 PG (ref 27–31)
MCHC RBC AUTO-ENTMCNC: 31.5 % (ref 32–36)
MCV RBC AUTO: 95.2 FL (ref 78–100)
MONOCYTES ABSOLUTE: 0.7 K/CU MM
MONOCYTES RELATIVE PERCENT: 5.4 % (ref 0–4)
NUCLEATED RBC %: 0 %
PDW BLD-RTO: 13.9 % (ref 11.7–14.9)
PLATELET # BLD: 267 K/CU MM (ref 140–440)
PMV BLD AUTO: 9.2 FL (ref 7.5–11.1)
POTASSIUM SERPL-SCNC: 3.6 MMOL/L (ref 3.5–5.1)
PROTHROMBIN TIME: 14.7 SECONDS (ref 11.7–14.5)
RBC # BLD: 3.57 M/CU MM (ref 4.6–6.2)
SCLERODERMA (SCL-70) AB: 0 AU/ML (ref 0–40)
SEGMENTED NEUTROPHILS ABSOLUTE COUNT: 11.5 K/CU MM
SEGMENTED NEUTROPHILS RELATIVE PERCENT: 88 % (ref 36–66)
SODIUM BLD-SCNC: 137 MMOL/L (ref 135–145)
TOTAL IMMATURE NEUTOROPHIL: 0.09 K/CU MM
TOTAL NUCLEATED RBC: 0 K/CU MM
WBC # BLD: 13.1 K/CU MM (ref 4–10.5)

## 2020-08-19 PROCEDURE — 1200000000 HC SEMI PRIVATE

## 2020-08-19 PROCEDURE — 94761 N-INVAS EAR/PLS OXIMETRY MLT: CPT

## 2020-08-19 PROCEDURE — 85379 FIBRIN DEGRADATION QUANT: CPT

## 2020-08-19 PROCEDURE — 2580000003 HC RX 258: Performed by: INTERNAL MEDICINE

## 2020-08-19 PROCEDURE — 99232 SBSQ HOSP IP/OBS MODERATE 35: CPT | Performed by: SURGERY

## 2020-08-19 PROCEDURE — 85610 PROTHROMBIN TIME: CPT

## 2020-08-19 PROCEDURE — 85384 FIBRINOGEN ACTIVITY: CPT

## 2020-08-19 PROCEDURE — 6360000002 HC RX W HCPCS: Performed by: INTERNAL MEDICINE

## 2020-08-19 PROCEDURE — 83735 ASSAY OF MAGNESIUM: CPT

## 2020-08-19 PROCEDURE — 6360000002 HC RX W HCPCS: Performed by: RADIOLOGY

## 2020-08-19 PROCEDURE — 36415 COLL VENOUS BLD VENIPUNCTURE: CPT

## 2020-08-19 PROCEDURE — 85025 COMPLETE CBC W/AUTO DIFF WBC: CPT

## 2020-08-19 PROCEDURE — 85730 THROMBOPLASTIN TIME PARTIAL: CPT

## 2020-08-19 PROCEDURE — 6370000000 HC RX 637 (ALT 250 FOR IP): Performed by: HOSPITALIST

## 2020-08-19 PROCEDURE — 80048 BASIC METABOLIC PNL TOTAL CA: CPT

## 2020-08-19 PROCEDURE — 6360000002 HC RX W HCPCS: Performed by: HOSPITALIST

## 2020-08-19 PROCEDURE — 76380 CAT SCAN FOLLOW-UP STUDY: CPT

## 2020-08-19 PROCEDURE — 2580000003 HC RX 258: Performed by: HOSPITALIST

## 2020-08-19 PROCEDURE — 2580000003 HC RX 258: Performed by: RADIOLOGY

## 2020-08-19 PROCEDURE — 6360000002 HC RX W HCPCS: Performed by: NURSE PRACTITIONER

## 2020-08-19 RX ORDER — 0.9 % SODIUM CHLORIDE 0.9 %
500 INTRAVENOUS SOLUTION INTRAVENOUS ONCE
Status: COMPLETED | OUTPATIENT
Start: 2020-08-19 | End: 2020-08-19

## 2020-08-19 RX ORDER — MIDAZOLAM HYDROCHLORIDE 1 MG/ML
0.5 INJECTION INTRAMUSCULAR; INTRAVENOUS ONCE
Status: COMPLETED | OUTPATIENT
Start: 2020-08-19 | End: 2020-08-19

## 2020-08-19 RX ORDER — FENTANYL CITRATE 50 UG/ML
25 INJECTION, SOLUTION INTRAMUSCULAR; INTRAVENOUS ONCE
Status: COMPLETED | OUTPATIENT
Start: 2020-08-19 | End: 2020-08-19

## 2020-08-19 RX ADMIN — SODIUM CHLORIDE, PRESERVATIVE FREE 10 ML: 5 INJECTION INTRAVENOUS at 21:23

## 2020-08-19 RX ADMIN — MORPHINE SULFATE 2 MG: 2 INJECTION, SOLUTION INTRAMUSCULAR; INTRAVENOUS at 09:06

## 2020-08-19 RX ADMIN — FENTANYL CITRATE 25 MCG: 50 INJECTION INTRAMUSCULAR; INTRAVENOUS at 13:30

## 2020-08-19 RX ADMIN — MORPHINE SULFATE 2 MG: 2 INJECTION, SOLUTION INTRAMUSCULAR; INTRAVENOUS at 16:40

## 2020-08-19 RX ADMIN — MIDAZOLAM 0.5 MG: 1 INJECTION INTRAMUSCULAR; INTRAVENOUS at 13:39

## 2020-08-19 RX ADMIN — MORPHINE SULFATE 2 MG: 2 INJECTION, SOLUTION INTRAMUSCULAR; INTRAVENOUS at 04:36

## 2020-08-19 RX ADMIN — FENTANYL CITRATE 25 MCG: 50 INJECTION INTRAMUSCULAR; INTRAVENOUS at 13:39

## 2020-08-19 RX ADMIN — MEROPENEM 1 G: 1 INJECTION, POWDER, FOR SOLUTION INTRAVENOUS at 19:40

## 2020-08-19 RX ADMIN — MORPHINE SULFATE 2 MG: 2 INJECTION, SOLUTION INTRAMUSCULAR; INTRAVENOUS at 21:22

## 2020-08-19 RX ADMIN — MEROPENEM 1 G: 1 INJECTION, POWDER, FOR SOLUTION INTRAVENOUS at 11:38

## 2020-08-19 RX ADMIN — SODIUM CHLORIDE 500 ML: 9 INJECTION, SOLUTION INTRAVENOUS at 13:30

## 2020-08-19 RX ADMIN — AZITHROMYCIN MONOHYDRATE 500 MG: 500 INJECTION, POWDER, LYOPHILIZED, FOR SOLUTION INTRAVENOUS at 08:54

## 2020-08-19 RX ADMIN — MIDAZOLAM 0.5 MG: 1 INJECTION INTRAMUSCULAR; INTRAVENOUS at 13:30

## 2020-08-19 RX ADMIN — CEFTRIAXONE 1 G: 1 INJECTION, POWDER, FOR SOLUTION INTRAMUSCULAR; INTRAVENOUS at 08:54

## 2020-08-19 RX ADMIN — SODIUM CHLORIDE: 9 INJECTION, SOLUTION INTRAVENOUS at 21:23

## 2020-08-19 ASSESSMENT — PAIN DESCRIPTION - PAIN TYPE: TYPE: ACUTE PAIN

## 2020-08-19 ASSESSMENT — PAIN DESCRIPTION - ONSET: ONSET: UNABLE TO TELL

## 2020-08-19 ASSESSMENT — PAIN DESCRIPTION - LOCATION: LOCATION: GENERALIZED

## 2020-08-19 ASSESSMENT — PAIN SCALES - GENERAL
PAINLEVEL_OUTOF10: 0

## 2020-08-19 ASSESSMENT — PAIN DESCRIPTION - FREQUENCY: FREQUENCY: CONTINUOUS

## 2020-08-19 ASSESSMENT — PAIN DESCRIPTION - DESCRIPTORS: DESCRIPTORS: PATIENT UNABLE TO DESCRIBE

## 2020-08-19 ASSESSMENT — PAIN SCALES - WONG BAKER: WONGBAKER_NUMERICALRESPONSE: 8

## 2020-08-19 NOTE — PROGRESS NOTES
Nephrology Progress Note  8/19/2020 11:00 AM  Subjective:   Admit Date: 8/14/2020  PCP: Mushtaq Pratt MD     Interval History: Accompanied by wife today. Anticipate biopsy this afternoon. Diet: Dietary Nutrition Supplements: Renal Oral Supplement  Diet NPO, After Midnight    Data:   Scheduled Meds:   meropenem  1 g Intravenous Q8H    lidocaine  1 patch Transdermal Daily    amantadine  100 mg Oral BID    aspirin  81 mg Oral Daily    escitalopram  10 mg Oral Nightly    rivastigmine  1.5 mg Oral BID    rosuvastatin  40 mg Oral QAM    bicalutamide  50 mg Oral Daily    sodium chloride flush  10 mL Intravenous 2 times per day    enoxaparin  30 mg Subcutaneous Daily    carbidopa-levodopa  2 tablet Oral 4x Daily    And    entacapone  200 mg Oral 4x Daily     Continuous Infusions:   sodium chloride 100 mL/hr at 08/18/20 1302     PRN Meds:potassium chloride, sodium chloride flush, acetaminophen **OR** acetaminophen, polyethylene glycol, promethazine **OR** ondansetron, morphine  I/O last 3 completed shifts: In: 1000 [I.V.:1000]  Out: 1975 [Urine:1975]  No intake/output data recorded. Intake/Output Summary (Last 24 hours) at 8/19/2020 1100  Last data filed at 8/19/2020 0438  Gross per 24 hour   Intake 1000 ml   Output 1975 ml   Net -975 ml     CBC:   Recent Labs     08/17/20  0515 08/18/20  0422 08/19/20  0508   WBC 9.6 11.9* 13.1*   HGB 9.7* 11.2* 10.7*    262 267     BMP:    Recent Labs     08/17/20  0700 08/17/20 2020 08/18/20  0422 08/19/20  0508     --  137 137   K 3.1* 4.3 4.2 3.6   *  --  103 105   CO2 18*  --  23 21   BUN 17  --  22 19   CREATININE 1.2  --  1.4* 1.3   GLUCOSE 62*  --  95 94     Hepatic:   No results for input(s): AST, ALT, ALB, BILITOT, ALKPHOS in the last 72 hours. Troponin: No results for input(s): TROPONINI in the last 72 hours. BNP: No results for input(s): BNP in the last 72 hours.   Lipids: No results for input(s): CHOL, HDL in the last 72 hours.    Invalid input(s): LDLCALCU  ABGs: No results found for: PHART, PO2ART, WYF3LCT  INR:   Recent Labs     08/17/20  0515 08/18/20  0422 08/19/20  0508   INR 1.11 1.06 1.21     Renal Labs  Albumin:    Lab Results   Component Value Date    LABALBU 3.1 08/14/2020     Calcium:    Lab Results   Component Value Date    CALCIUM 8.7 08/19/2020     Phosphorus:  No results found for: PHOS  U/A:    Lab Results   Component Value Date    NITRU NEGATIVE 08/15/2020    COLORU YELLOW 08/15/2020    WBCUA 1 08/15/2020    RBCUA 6 08/15/2020    MUCUS RARE 08/15/2020    TRICHOMONAS NONE SEEN 08/15/2020    BACTERIA NEGATIVE 08/15/2020    CLARITYU CLEAR 08/15/2020    SPECGRAV 1.013 08/15/2020    UROBILINOGEN NORMAL 08/15/2020    BILIRUBINUR NEGATIVE 08/15/2020    BLOODU SMALL 08/15/2020    KETUA MODERATE 08/15/2020     ABG:  No results found for: PHART, MPM4GTJ, PO2ART, IRF5HDP, BEART, THGBART, CZI5GLO, E1FOBNIP  HgBA1c:  No results found for: LABA1C  Microalbumen/Creatinine ratio:  No components found for: RUCREAT    Objective:   Vitals: BP (!) 144/91   Pulse 94   Temp 99.9 °F (37.7 °C) (Oral)   Resp 15   Ht 6' 1\" (1.854 m)   Wt 152 lb 8.9 oz (69.2 kg)   SpO2 98%   BMI 20.13 kg/m²      General appearance:  awake and verbally interactive; confused   HEENT: Head: normocephalic, atraumatic.   Neck: supple, symmetrical, trachea midline  Cardiovascular: normal S1 and S2  Pulmonary: diminished lung sounds bilaterally   Abdomen:  soft / non-tender   Extremities: Trace edema to bilateral lower legs     Patient Active Problem List:     Weight loss, unintentional     History of colon polyps     Pelvic mass    Assessment and Plan:    IMP:  1 arf from atn/pra/hydro  2 right side mass possible malignancy  3 hx prostate ca  4 hyponatremia  5 protein malnutrtion with constipation  6 hx scleroderma    Plan   1.    -stable serum creatinine with normal: Na / K / CO2  -uop: 1.97 liters in the last 24 hours   2.   -anticipate biopsy by IR today -followed by Dr. Courtney Quintana; may have perforated appendicitis    3.  -follow-up with Urology   4.   -latest serum sodium:137; following trend   5.    -on oral protein supplementation   6.   -anti-scleroderma antibody; in process   -no overt findings suggestive of renal crisis     Electronically signed by SURENDRA Levy - CNP                  Nephrology Attending Progress Note  8/19/2020 3:06 PM  Subjective:   Admit Date: 8/14/2020  PCP: Sigrid Reyez MD    Interval History: I have personally performed face to face diagnostic evaluation on this patient. I have personally reviewed pertinent labs and imaging and agree with the care plan above. My additional findings are as follows: Pt weak and agiated and unable IR procedure,.      Objective:   Vitals: /72   Pulse 98   Temp 99.9 °F (37.7 °C) (Oral)   Resp (!) 36   Ht 6' 1\" (1.854 m)   Wt 152 lb 8.9 oz (69.2 kg)   SpO2 93%   BMI 20.13 kg/m²   Weak confused  decresae bs  Trace edema    Assessment and Plan:  IMP:  As stated above    Plan     1 scleroderma titer negative  2 bp low stable  3 plan drain fluid with IR in am  4 renal improved  5 Na and K stable  Will follow             Electronically signed by Eron Root MD on 8/19/2020 at 3:06 PM

## 2020-08-19 NOTE — PROGRESS NOTES
GENERAL SURGERY PROGRESS NOTE    CC/HPI:           Patient feels the same. I discussed the case with radiologist yesterday; IR, and today's Biopsies, after CTA yesterday suggested perforated appy. Vitals:    08/18/20 0851 08/18/20 1459 08/18/20 2127 08/19/20 0430   BP: (!) 177/105 128/75 (!) 150/81 (!) 159/76   Pulse: 90 85 90 84   Resp: 16 16 16 16   Temp: 99.7 °F (37.6 °C) 99 °F (37.2 °C) 99.5 °F (37.5 °C) 98.5 °F (36.9 °C)   TempSrc: Oral Axillary Oral Oral   SpO2: 92% 93% 92% 95%   Weight:       Height:         I/O last 3 completed shifts: In: 1000 [I.V.:1000]  Out: 1975 [Urine:1975]  No intake/output data recorded.     Dietary Nutrition Supplements: Renal Oral Supplement  Diet NPO, After Midnight    Recent Results (from the past 50 hour(s))   Potassium    Collection Time: 08/17/20  8:20 PM   Result Value Ref Range    Potassium 4.3 3.5 - 5.1 MMOL/L   Protime-INR    Collection Time: 08/18/20  4:22 AM   Result Value Ref Range    Protime 12.8 11.7 - 14.5 SECONDS    INR 1.06 INDEX   APTT    Collection Time: 08/18/20  4:22 AM   Result Value Ref Range    aPTT 44.9 (H) 25.1 - 37.1 SECONDS   Fibrinogen    Collection Time: 08/18/20  4:22 AM   Result Value Ref Range    Fibrinogen 705 (H) 196.9 - 442.1 MG/DL   D-Dimer, Quantitative    Collection Time: 08/18/20  4:22 AM   Result Value Ref Range    D-Dimer, Quant 1459 (H) <230 NG/mL(DDU)   CBC Auto Differential    Collection Time: 08/18/20  4:22 AM   Result Value Ref Range    WBC 11.9 (H) 4.0 - 10.5 K/CU MM    RBC 3.75 (L) 4.6 - 6.2 M/CU MM    Hemoglobin 11.2 (L) 13.5 - 18.0 GM/DL    Hematocrit 35.4 (L) 42 - 52 %    MCV 94.4 78 - 100 FL    MCH 29.9 27 - 31 PG    MCHC 31.6 (L) 32.0 - 36.0 %    RDW 13.6 11.7 - 14.9 %    Platelets 523 187 - 517 K/CU MM    MPV 9.4 7.5 - 11.1 FL    Differential Type AUTOMATED DIFFERENTIAL     Segs Relative 86.9 (H) 36 - 66 %    Lymphocytes % 5.5 (L) 24 - 44 %    Monocytes % 6.1 (H) 0 - 4 %    Eosinophils % 0.4 0 - 3 % Basophils % 0.4 0 - 1 %    Segs Absolute 10.4 K/CU MM    Lymphocytes Absolute 0.7 K/CU MM    Monocytes Absolute 0.7 K/CU MM    Eosinophils Absolute 0.1 K/CU MM    Basophils Absolute 0.1 K/CU MM    Nucleated RBC % 0.0 %    Total Nucleated RBC 0.0 K/CU MM    Total Immature Neutrophil 0.08 K/CU MM    Immature Neutrophil % 0.7 (H) 0 - 0.43 %   C-reactive protein    Collection Time: 08/18/20  4:22 AM   Result Value Ref Range    CRP, High Sensitivity 206.3 mg/L   Procalcitonin    Collection Time: 08/18/20  4:22 AM   Result Value Ref Range    Procalcitonin 9.336    Basic metabolic panel    Collection Time: 08/18/20  4:22 AM   Result Value Ref Range    Sodium 137 135 - 145 MMOL/L    Potassium 4.2 3.5 - 5.1 MMOL/L    Chloride 103 99 - 110 mMol/L    CO2 23 21 - 32 MMOL/L    Anion Gap 11 4 - 16    BUN 22 6 - 23 MG/DL    CREATININE 1.4 (H) 0.9 - 1.3 MG/DL    Glucose 95 70 - 99 MG/DL    Calcium 8.8 8.3 - 10.6 MG/DL    GFR Non- 49 (L) >60 mL/min/1.73m2    GFR  59 (L) >60 mL/min/1.73m2   Magnesium    Collection Time: 08/18/20  4:22 AM   Result Value Ref Range    Magnesium 2.3 1.8 - 2.4 mg/dl   Protime-INR    Collection Time: 08/19/20  5:08 AM   Result Value Ref Range    Protime 14.7 (H) 11.7 - 14.5 SECONDS    INR 1.21 INDEX   APTT    Collection Time: 08/19/20  5:08 AM   Result Value Ref Range    aPTT 41.1 (H) 25.1 - 37.1 SECONDS   Fibrinogen    Collection Time: 08/19/20  5:08 AM   Result Value Ref Range    Fibrinogen 658 (H) 196.9 - 442.1 MG/DL   CBC Auto Differential    Collection Time: 08/19/20  5:08 AM   Result Value Ref Range    WBC 13.1 (H) 4.0 - 10.5 K/CU MM    RBC 3.57 (L) 4.6 - 6.2 M/CU MM    Hemoglobin 10.7 (L) 13.5 - 18.0 GM/DL    Hematocrit 34.0 (L) 42 - 52 %    MCV 95.2 78 - 100 FL    MCH 30.0 27 - 31 PG    MCHC 31.5 (L) 32.0 - 36.0 %    RDW 13.9 11.7 - 14.9 %    Platelets 553 508 - 242 K/CU MM    MPV 9.2 7.5 - 11.1 FL    Differential Type AUTOMATED DIFFERENTIAL     Segs Relative 88.0 (H) 36 - 66 %    Lymphocytes % 5.5 (L) 24 - 44 %    Monocytes % 5.4 (H) 0 - 4 %    Eosinophils % 0.2 0 - 3 %    Basophils % 0.2 0 - 1 %    Segs Absolute 11.5 K/CU MM    Lymphocytes Absolute 0.7 K/CU MM    Monocytes Absolute 0.7 K/CU MM    Eosinophils Absolute 0.0 K/CU MM    Basophils Absolute 0.0 K/CU MM    Nucleated RBC % 0.0 %    Total Nucleated RBC 0.0 K/CU MM    Total Immature Neutrophil 0.09 K/CU MM    Immature Neutrophil % 0.7 (H) 0 - 0.43 %   Basic metabolic panel    Collection Time: 08/19/20  5:08 AM   Result Value Ref Range    Sodium 137 135 - 145 MMOL/L    Potassium 3.6 3.5 - 5.1 MMOL/L    Chloride 105 99 - 110 mMol/L    CO2 21 21 - 32 MMOL/L    Anion Gap 11 4 - 16    BUN 19 6 - 23 MG/DL    CREATININE 1.3 0.9 - 1.3 MG/DL    Glucose 94 70 - 99 MG/DL    Calcium 8.7 8.3 - 10.6 MG/DL    GFR Non- 53 (L) >60 mL/min/1.73m2    GFR African American >60 >60 mL/min/1.73m2   Magnesium    Collection Time: 08/19/20  5:08 AM   Result Value Ref Range    Magnesium 2.0 1.8 - 2.4 mg/dl       Scheduled Meds:   lidocaine  1 patch Transdermal Daily    amantadine  100 mg Oral BID    aspirin  81 mg Oral Daily    escitalopram  10 mg Oral Nightly    rivastigmine  1.5 mg Oral BID    rosuvastatin  40 mg Oral QAM    bicalutamide  50 mg Oral Daily    sodium chloride flush  10 mL Intravenous 2 times per day    enoxaparin  30 mg Subcutaneous Daily    carbidopa-levodopa  2 tablet Oral 4x Daily    And    entacapone  200 mg Oral 4x Daily    cefTRIAXone (ROCEPHIN) IV  1 g Intravenous Daily    azithromycin  500 mg Intravenous Daily       Continuous Infusions:   sodium chloride 100 mL/hr at 08/18/20 1302       Physical Exam:  HEENT: Anicteric sclerae, Oropharyngeal mucosae moist, pink and intact. Heart:  Normal S1 and S2, RRR  Lungs: Clear to auscultation bilaterally, No audible Wheezes or Rales. Extremities: No edema.   Neuro: Alert and Oriented x 3, Non focal.  Abdomen: Soft, Benign, not MILDLY tender lower abdomen. ., Non distended, Positive bowel sounds. Active Problems:    Pelvic mass  Resolved Problems:    * No resolved hospital problems. *      Assessment and Plan:  CT the day before yesterday:  Impression    Right pelvic mass.  Differential diagnosis includes subacute hematoma or    infiltrative malignancy.         Mild right hydronephrosis due to compression of the ureter by the mass.         Bibasilar pulmonary consolidation or atelectasis.       CT yesterday:  Impression    1. There is a complex 5.5 x 4.7 cm collection in the right pelvis with    surrounding mild peripheral enhancement extends into the right iliopsoas    muscle and likely abuts and encases the right external iliac vessels. Charlynne Plane    is also obstruction of the right ureter resulting in mild    hydroureteronephrosis. Charlynne Plane is a fluid-filled thick-walled structure    extending from the cecum towards this collection raising suspicion for    perforated appendicitis with associated phlegmon/abscess.  Less likely    considerations include hematoma or malignancy either metastatic disease or    lymphoma. 2. Status post prostatectomy.  Otherwise no obvious urinary tract filling    defect or calculus given limitations due to motion artifact. Results were called by Dr. Krishna Silva. Silvana Wilkins MD [facility] on    8/18/2020 at 14:15. Diagnosis:      Patient Active Problem List   Diagnosis    Weight loss, unintentional    History of colon polyps    Pelvic mass        Assessment & plan:  Garima Sheridan is a very pleasant 79 y.o. male presenting with a pelvic mass, ? Perforated appendicitis? .     Percutaneous biopsy I recommend first, and then I'll manage according to its result.     Waiting for IR to proceed with biopsies, probably today, after I discussed the case with radiologist yesterday.    ___________________________________________    Anastacio Elliott MD, [facility], [facility]  8/19/2020  7:05 AM

## 2020-08-19 NOTE — PLAN OF CARE
Problem: Falls - Risk of:  Goal: Will remain free from falls  Description: Will remain free from falls  Outcome: Ongoing  Goal: Absence of physical injury  Description: Absence of physical injury  Outcome: Ongoing     Problem: Skin Integrity:  Goal: Will show no infection signs and symptoms  Description: Will show no infection signs and symptoms  Outcome: Ongoing  Goal: Absence of new skin breakdown  Description: Absence of new skin breakdown  Outcome: Ongoing     Problem: Pain:  Description: Pain management should include both nonpharmacologic and pharmacologic interventions.   Goal: Pain level will decrease  Description: Pain level will decrease  Outcome: Ongoing  Goal: Control of acute pain  Description: Control of acute pain  Outcome: Ongoing  Goal: Control of chronic pain  Description: Control of chronic pain  Outcome: Ongoing     Problem: Gas Exchange - Impaired:  Goal: Levels of oxygenation will improve  Description: Levels of oxygenation will improve  Outcome: Ongoing     Problem: Urinary Elimination:  Goal: Signs and symptoms of infection will decrease  Description: Signs and symptoms of infection will decrease  Outcome: Ongoing  Goal: Complications related to the disease process, condition or treatment will be avoided or minimized  Description: Complications related to the disease process, condition or treatment will be avoided or minimized  Outcome: Ongoing  Goal: Skin integrity will improve  Description: Skin integrity will improve  Outcome: Ongoing  Goal: Ability to recognize the need to void and respond appropriately will improve  Description: Ability to recognize the need to void and respond appropriately will improve  Outcome: Ongoing  Goal: Will remain free from infection  Description: Will remain free from infection  Outcome: Ongoing  Goal: Incidence of incontinence will decrease  Description: Incidence of incontinence will decrease  Outcome: Ongoing     Problem: Health Behavior:  Goal: Compliance with treatment plan for underlying cause of condition will improve  Description: Compliance with treatment plan for underlying cause of condition will improve  Outcome: Ongoing  Goal: Identification of resources available to assist in meeting health care needs will improve  Description: Identification of resources available to assist in meeting health care needs will improve  Outcome: Ongoing     Problem: Fluid Volume:  Goal: Maintenance of adequate hydration will improve  Description: Maintenance of adequate hydration will improve  Outcome: Ongoing

## 2020-08-19 NOTE — PROGRESS NOTES
Notified Dr. Daniela Juares via perfect serve that IR unable to perform procedure to place right pelvic drain due to pt would not lie still for procedure and will re-attempt in the a.m. with anesthesia.

## 2020-08-19 NOTE — PROGRESS NOTES
Hospitalist Progress Note      Name:  Sukhjinder Dias /Age/Sex: 1940  (79 y.o. male)   MRN & CSN:  [id number] & [id number] Admission Date/Time: 2020  7:25 PM   Location:  [address] [place] [address]-A PCP: Noah Hoffman MD         Hospital Day: 6    Assessment and Plan:   Aldair Salcido Dingle is a 79 y.o.  male  who presents with      1) Abd pain due to pelvic mass with right sided hydronephrosis  -CT A/P: Rt pelvic mass with mild Rt hydronephrosis  -Urology and General Surgery on board  - CT Urogram complex 5.5x4.7cm collection in the Rt pelvis with mild peripheral enhancement extending into the Rt iliopsoas muscle as well as obstruction of the Rt ureter with hydroureteronephrosis. Also fluid filled thick walled structure extending from the cecum towards this collection raising suspicion  For perforated appendicitis with associated abscess  -BC: 2020: NGTD  -IR consulted for percutaneous biopsy  -Will start IV Meropenem  -CRP trending down        2) Acute Resp distress with hypoxia 2/2 possible PNA   -CXR: Bilateral symmetric interstitial opacities  -COVID 19 negative   -Currently off O2     3) SLIM  -Cr 1.7--1.4  -Nephrology on board     Other chronic medical conditions; medication resumed unless contraindicated     -Mod PCM  -Prostate cancer /sp prostatectomy   -Dementia parkisons       Diet Dietary Nutrition Supplements: Renal Oral Supplement  Diet NPO, After Midnight   DVT Prophylaxis [] Lovenox, []  Heparin, [] SCDs, [] Ambulation   GI Prophylaxis [] PPI,  [] H2 Blocker,  [] Carafate,  [] Diet/Tube Feeds   Code Status Full Code   Disposition TBD   [facility]      History of Present Illness:     Patient was seen and examined  Still very confused  No acute event overnight      Ten point ROS reviewed negative, unless as noted above    Objective:        Intake/Output Summary (Last 24 hours) at 2020 1010  Last data filed at 2020 0438  Gross per 24 hour   Intake 1000 ml   Output 1975 ml   Net -975 ml      Vitals: Daily PRN  promethazine, 12.5 mg, Q6H PRN    Or  ondansetron, 4 mg, Q6H PRN  morphine, 2 mg, Q4H PRN          Electronically signed by Chiquita Vivas MD on 8/19/2020 at 10:10 AM no chipped teeth

## 2020-08-19 NOTE — CARE COORDINATION
Met c pt's spouse at bedside to continue planning. At this time wifes goal is to try and take pt home (still working on getting the name of [facility] provider) If she takes pt home she would need a miracle lift and hospital bed. She is considering SNF [facility] and would want [facility] as [facility] has been there before. Permission given to share PHI. PT/OT pending- will follow up with pt/spouse tomorrow to continue to establish plan.      If DME needed: [facility]  [address]  [place], [place] [address]  Phone: [phone]  Fax: [phone]  Alternate Phone: [phone]

## 2020-08-20 ENCOUNTER — APPOINTMENT (OUTPATIENT)
Dept: CT IMAGING | Age: 80
DRG: 371 | End: 2020-08-20
Payer: MEDICARE

## 2020-08-20 ENCOUNTER — ANESTHESIA (OUTPATIENT)
Dept: CT IMAGING | Age: 80
DRG: 371 | End: 2020-08-20
Payer: MEDICARE

## 2020-08-20 ENCOUNTER — ANESTHESIA EVENT (OUTPATIENT)
Dept: CT IMAGING | Age: 80
DRG: 371 | End: 2020-08-20
Payer: MEDICARE

## 2020-08-20 VITALS
OXYGEN SATURATION: 99 % | DIASTOLIC BLOOD PRESSURE: 73 MMHG | RESPIRATION RATE: 20 BRPM | SYSTOLIC BLOOD PRESSURE: 116 MMHG

## 2020-08-20 LAB
ANION GAP SERPL CALCULATED.3IONS-SCNC: 11 MMOL/L (ref 4–16)
APTT: 45.1 SECONDS (ref 25.1–37.1)
BASOPHILS ABSOLUTE: 0 K/CU MM
BASOPHILS RELATIVE PERCENT: 0.2 % (ref 0–1)
BUN BLDV-MCNC: 21 MG/DL (ref 6–23)
CALCIUM SERPL-MCNC: 8.8 MG/DL (ref 8.3–10.6)
CHLORIDE BLD-SCNC: 114 MMOL/L (ref 99–110)
CO2: 21 MMOL/L (ref 21–32)
CREAT SERPL-MCNC: 1.4 MG/DL (ref 0.9–1.3)
CULTURE: NORMAL
D DIMER: 2097 NG/ML(DDU)
DIFFERENTIAL TYPE: ABNORMAL
EOSINOPHILS ABSOLUTE: 0.1 K/CU MM
EOSINOPHILS RELATIVE PERCENT: 0.7 % (ref 0–3)
FIBRINOGEN LEVEL: 682 MG/DL (ref 196.9–442.1)
GFR AFRICAN AMERICAN: 59 ML/MIN/1.73M2
GFR NON-AFRICAN AMERICAN: 49 ML/MIN/1.73M2
GLUCOSE BLD-MCNC: 83 MG/DL (ref 70–99)
HCT VFR BLD CALC: 30.6 % (ref 42–52)
HEMOGLOBIN: 9.7 GM/DL (ref 13.5–18)
IMMATURE NEUTROPHIL %: 0.7 % (ref 0–0.43)
INR BLD: 1.14 INDEX
LYMPHOCYTES ABSOLUTE: 0.8 K/CU MM
LYMPHOCYTES RELATIVE PERCENT: 7.8 % (ref 24–44)
Lab: NORMAL
MAGNESIUM: 2 MG/DL (ref 1.8–2.4)
MCH RBC QN AUTO: 29.9 PG (ref 27–31)
MCHC RBC AUTO-ENTMCNC: 31.7 % (ref 32–36)
MCV RBC AUTO: 94.4 FL (ref 78–100)
MONOCYTES ABSOLUTE: 0.6 K/CU MM
MONOCYTES RELATIVE PERCENT: 6.1 % (ref 0–4)
NUCLEATED RBC %: 0 %
PDW BLD-RTO: 14.2 % (ref 11.7–14.9)
PLATELET # BLD: 259 K/CU MM (ref 140–440)
PMV BLD AUTO: 9.5 FL (ref 7.5–11.1)
POTASSIUM SERPL-SCNC: 3.7 MMOL/L (ref 3.5–5.1)
PROTHROMBIN TIME: 13.8 SECONDS (ref 11.7–14.5)
RBC # BLD: 3.24 M/CU MM (ref 4.6–6.2)
SEGMENTED NEUTROPHILS ABSOLUTE COUNT: 8.3 K/CU MM
SEGMENTED NEUTROPHILS RELATIVE PERCENT: 84.5 % (ref 36–66)
SODIUM BLD-SCNC: 146 MMOL/L (ref 135–145)
SPECIMEN: NORMAL
TOTAL IMMATURE NEUTOROPHIL: 0.07 K/CU MM
TOTAL NUCLEATED RBC: 0 K/CU MM
WBC # BLD: 9.8 K/CU MM (ref 4–10.5)

## 2020-08-20 PROCEDURE — 88108 CYTOPATH CONCENTRATE TECH: CPT | Performed by: PATHOLOGY

## 2020-08-20 PROCEDURE — 3700000000 HC ANESTHESIA ATTENDED CARE

## 2020-08-20 PROCEDURE — 87075 CULTR BACTERIA EXCEPT BLOOD: CPT

## 2020-08-20 PROCEDURE — 97163 PT EVAL HIGH COMPLEX 45 MIN: CPT

## 2020-08-20 PROCEDURE — 6360000002 HC RX W HCPCS: Performed by: NURSE ANESTHETIST, CERTIFIED REGISTERED

## 2020-08-20 PROCEDURE — 36415 COLL VENOUS BLD VENIPUNCTURE: CPT

## 2020-08-20 PROCEDURE — 88341 IMHCHEM/IMCYTCHM EA ADD ANTB: CPT | Performed by: PATHOLOGY

## 2020-08-20 PROCEDURE — 3700000001 HC ADD 15 MINUTES (ANESTHESIA)

## 2020-08-20 PROCEDURE — 85379 FIBRIN DEGRADATION QUANT: CPT

## 2020-08-20 PROCEDURE — 87070 CULTURE OTHR SPECIMN AEROBIC: CPT

## 2020-08-20 PROCEDURE — 97530 THERAPEUTIC ACTIVITIES: CPT

## 2020-08-20 PROCEDURE — 99232 SBSQ HOSP IP/OBS MODERATE 35: CPT | Performed by: SURGERY

## 2020-08-20 PROCEDURE — 85730 THROMBOPLASTIN TIME PARTIAL: CPT

## 2020-08-20 PROCEDURE — 85025 COMPLETE CBC W/AUTO DIFF WBC: CPT

## 2020-08-20 PROCEDURE — 2580000003 HC RX 258: Performed by: NURSE ANESTHETIST, CERTIFIED REGISTERED

## 2020-08-20 PROCEDURE — 88305 TISSUE EXAM BY PATHOLOGIST: CPT | Performed by: PATHOLOGY

## 2020-08-20 PROCEDURE — 2580000003 HC RX 258: Performed by: INTERNAL MEDICINE

## 2020-08-20 PROCEDURE — 1200000000 HC SEMI PRIVATE

## 2020-08-20 PROCEDURE — 88342 IMHCHEM/IMCYTCHM 1ST ANTB: CPT | Performed by: PATHOLOGY

## 2020-08-20 PROCEDURE — 6360000002 HC RX W HCPCS: Performed by: NURSE PRACTITIONER

## 2020-08-20 PROCEDURE — 2500000003 HC RX 250 WO HCPCS: Performed by: NURSE ANESTHETIST, CERTIFIED REGISTERED

## 2020-08-20 PROCEDURE — 97167 OT EVAL HIGH COMPLEX 60 MIN: CPT

## 2020-08-20 PROCEDURE — 6370000000 HC RX 637 (ALT 250 FOR IP): Performed by: HOSPITALIST

## 2020-08-20 PROCEDURE — 2709999900 CT DRAINAGE HEMATOMA/SEROMA/FLUID COLLECTION

## 2020-08-20 PROCEDURE — 85610 PROTHROMBIN TIME: CPT

## 2020-08-20 PROCEDURE — 80048 BASIC METABOLIC PNL TOTAL CA: CPT

## 2020-08-20 PROCEDURE — 85384 FIBRINOGEN ACTIVITY: CPT

## 2020-08-20 PROCEDURE — 83735 ASSAY OF MAGNESIUM: CPT

## 2020-08-20 PROCEDURE — 6360000002 HC RX W HCPCS

## 2020-08-20 PROCEDURE — 6360000002 HC RX W HCPCS: Performed by: INTERNAL MEDICINE

## 2020-08-20 PROCEDURE — 2700000000 HC OXYGEN THERAPY PER DAY

## 2020-08-20 PROCEDURE — 97535 SELF CARE MNGMENT TRAINING: CPT

## 2020-08-20 PROCEDURE — 94761 N-INVAS EAR/PLS OXIMETRY MLT: CPT

## 2020-08-20 PROCEDURE — 0W9J30Z DRAINAGE OF PELVIC CAVITY WITH DRAINAGE DEVICE, PERCUTANEOUS APPROACH: ICD-10-PCS | Performed by: RADIOLOGY

## 2020-08-20 PROCEDURE — 87205 SMEAR GRAM STAIN: CPT

## 2020-08-20 RX ORDER — SODIUM CHLORIDE, SODIUM LACTATE, POTASSIUM CHLORIDE, CALCIUM CHLORIDE 600; 310; 30; 20 MG/100ML; MG/100ML; MG/100ML; MG/100ML
INJECTION, SOLUTION INTRAVENOUS CONTINUOUS PRN
Status: DISCONTINUED | OUTPATIENT
Start: 2020-08-20 | End: 2020-08-20 | Stop reason: SDUPTHER

## 2020-08-20 RX ORDER — LIDOCAINE HYDROCHLORIDE 20 MG/ML
INJECTION, SOLUTION INFILTRATION; PERINEURAL PRN
Status: DISCONTINUED | OUTPATIENT
Start: 2020-08-20 | End: 2020-08-20 | Stop reason: SDUPTHER

## 2020-08-20 RX ORDER — SODIUM CHLORIDE 0.9 % (FLUSH) 0.9 %
5 SYRINGE (ML) INJECTION 2 TIMES DAILY
Status: DISCONTINUED | OUTPATIENT
Start: 2020-08-20 | End: 2020-08-30 | Stop reason: HOSPADM

## 2020-08-20 RX ORDER — FENTANYL CITRATE 50 UG/ML
INJECTION, SOLUTION INTRAMUSCULAR; INTRAVENOUS PRN
Status: DISCONTINUED | OUTPATIENT
Start: 2020-08-20 | End: 2020-08-20 | Stop reason: SDUPTHER

## 2020-08-20 RX ORDER — PROPOFOL 10 MG/ML
INJECTION, EMULSION INTRAVENOUS PRN
Status: DISCONTINUED | OUTPATIENT
Start: 2020-08-20 | End: 2020-08-20 | Stop reason: SDUPTHER

## 2020-08-20 RX ADMIN — PROPOFOL 30 MG: 10 INJECTION, EMULSION INTRAVENOUS at 14:17

## 2020-08-20 RX ADMIN — SODIUM CHLORIDE: 9 INJECTION, SOLUTION INTRAVENOUS at 10:14

## 2020-08-20 RX ADMIN — MEROPENEM 1 G: 1 INJECTION, POWDER, FOR SOLUTION INTRAVENOUS at 03:55

## 2020-08-20 RX ADMIN — PROPOFOL 30 MG: 10 INJECTION, EMULSION INTRAVENOUS at 14:05

## 2020-08-20 RX ADMIN — PROPOFOL 30 MG: 10 INJECTION, EMULSION INTRAVENOUS at 14:02

## 2020-08-20 RX ADMIN — MORPHINE SULFATE 2 MG: 2 INJECTION, SOLUTION INTRAMUSCULAR; INTRAVENOUS at 05:48

## 2020-08-20 RX ADMIN — MORPHINE SULFATE 2 MG: 2 INJECTION, SOLUTION INTRAMUSCULAR; INTRAVENOUS at 01:27

## 2020-08-20 RX ADMIN — SODIUM CHLORIDE, POTASSIUM CHLORIDE, SODIUM LACTATE AND CALCIUM CHLORIDE: 600; 310; 30; 20 INJECTION, SOLUTION INTRAVENOUS at 13:47

## 2020-08-20 RX ADMIN — PROPOFOL 30 MG: 10 INJECTION, EMULSION INTRAVENOUS at 13:57

## 2020-08-20 RX ADMIN — MEROPENEM 1 G: 1 INJECTION, POWDER, FOR SOLUTION INTRAVENOUS at 10:27

## 2020-08-20 RX ADMIN — PROPOFOL 30 MG: 10 INJECTION, EMULSION INTRAVENOUS at 14:24

## 2020-08-20 RX ADMIN — FENTANYL CITRATE 50 MCG: 50 INJECTION INTRAMUSCULAR; INTRAVENOUS at 13:55

## 2020-08-20 RX ADMIN — LIDOCAINE HYDROCHLORIDE 100 MG: 20 INJECTION, SOLUTION INFILTRATION; PERINEURAL at 13:57

## 2020-08-20 RX ADMIN — PROPOFOL 30 MG: 10 INJECTION, EMULSION INTRAVENOUS at 14:08

## 2020-08-20 RX ADMIN — PROPOFOL 30 MG: 10 INJECTION, EMULSION INTRAVENOUS at 14:45

## 2020-08-20 RX ADMIN — PROPOFOL 30 MG: 10 INJECTION, EMULSION INTRAVENOUS at 14:14

## 2020-08-20 RX ADMIN — PROPOFOL 30 MG: 10 INJECTION, EMULSION INTRAVENOUS at 13:59

## 2020-08-20 RX ADMIN — PROPOFOL 30 MG: 10 INJECTION, EMULSION INTRAVENOUS at 14:33

## 2020-08-20 RX ADMIN — MORPHINE SULFATE 2 MG: 2 INJECTION, SOLUTION INTRAMUSCULAR; INTRAVENOUS at 12:18

## 2020-08-20 ASSESSMENT — PULMONARY FUNCTION TESTS
PIF_VALUE: 0
PIF_VALUE: 1
PIF_VALUE: 0
PIF_VALUE: 1
PIF_VALUE: 1
PIF_VALUE: 0
PIF_VALUE: 1
PIF_VALUE: 0
PIF_VALUE: 1
PIF_VALUE: 0
PIF_VALUE: 1
PIF_VALUE: 0
PIF_VALUE: 1
PIF_VALUE: 0
PIF_VALUE: 1
PIF_VALUE: 0
PIF_VALUE: 0
PIF_VALUE: 1
PIF_VALUE: 0
PIF_VALUE: 1
PIF_VALUE: 1
PIF_VALUE: 0

## 2020-08-20 ASSESSMENT — PAIN SCALES - GENERAL
PAINLEVEL_OUTOF10: 5
PAINLEVEL_OUTOF10: 7

## 2020-08-20 NOTE — PROGRESS NOTES
Nephrology Progress Note  8/20/2020 9:14 AM  Subjective:   Admit Date: 8/14/2020  PCP: Marie Flannery MD     Interval History: Accompanied by wife today. Anticipate biopsy this afternoon. Diet: Dietary Nutrition Supplements: Renal Oral Supplement  Diet NPO, After Midnight    Data:   Scheduled Meds:   meropenem  1 g Intravenous Q8H    lidocaine  1 patch Transdermal Daily    amantadine  100 mg Oral BID    aspirin  81 mg Oral Daily    escitalopram  10 mg Oral Nightly    rivastigmine  1.5 mg Oral BID    rosuvastatin  40 mg Oral QAM    bicalutamide  50 mg Oral Daily    sodium chloride flush  10 mL Intravenous 2 times per day    enoxaparin  30 mg Subcutaneous Daily    carbidopa-levodopa  2 tablet Oral 4x Daily    And    entacapone  200 mg Oral 4x Daily     Continuous Infusions:   sodium chloride 100 mL/hr at 08/19/20 2123     PRN Meds:potassium chloride, sodium chloride flush, acetaminophen **OR** acetaminophen, polyethylene glycol, promethazine **OR** ondansetron, morphine  I/O last 3 completed shifts:  In: -   Out: 750 [Urine:750]  No intake/output data recorded. Intake/Output Summary (Last 24 hours) at 8/20/2020 0914  Last data filed at 8/20/2020 0551  Gross per 24 hour   Intake --   Output 750 ml   Net -750 ml     CBC:   Recent Labs     08/18/20  0422 08/19/20  0508 08/20/20  0531   WBC 11.9* 13.1* 9.8   HGB 11.2* 10.7* 9.7*    267 259     BMP:    Recent Labs     08/18/20  0422 08/19/20  0508 08/20/20  0531    137 146*   K 4.2 3.6 3.7    105 114*   CO2 23 21 21   BUN 22 19 21   CREATININE 1.4* 1.3 1.4*   GLUCOSE 95 94 83     Hepatic:   No results for input(s): AST, ALT, ALB, BILITOT, ALKPHOS in the last 72 hours. Troponin: No results for input(s): TROPONINI in the last 72 hours. BNP: No results for input(s): BNP in the last 72 hours. Lipids: No results for input(s): CHOL, HDL in the last 72 hours.     Invalid input(s): LDLCALCU  ABGs: No results found for: PHART, PO2ART, PSP6DVB  INR:   Recent Labs     08/18/20  0422 08/19/20  0508 08/20/20  0531   INR 1.06 1.21 1.14     Renal Labs  Albumin:    Lab Results   Component Value Date    LABALBU 3.1 08/14/2020     Calcium:    Lab Results   Component Value Date    CALCIUM 8.8 08/20/2020     Phosphorus:  No results found for: PHOS  U/A:    Lab Results   Component Value Date    NITRU NEGATIVE 08/15/2020    COLORU YELLOW 08/15/2020    WBCUA 1 08/15/2020    RBCUA 6 08/15/2020    MUCUS RARE 08/15/2020    TRICHOMONAS NONE SEEN 08/15/2020    BACTERIA NEGATIVE 08/15/2020    CLARITYU CLEAR 08/15/2020    SPECGRAV 1.013 08/15/2020    UROBILINOGEN NORMAL 08/15/2020    BILIRUBINUR NEGATIVE 08/15/2020    BLOODU SMALL 08/15/2020    KETUA MODERATE 08/15/2020     ABG:  No results found for: PHART, AUL3BXW, PO2ART, AVA1TRP, BEART, THGBART, SAD7ONG, F7GBBKKK  HgBA1c:  No results found for: LABA1C  Microalbumen/Creatinine ratio:  No components found for: RUCREAT    Objective:   Vitals: /77   Pulse 90   Temp 98.1 °F (36.7 °C) (Oral)   Resp 17   Ht 6' 1\" (1.854 m)   Wt 152 lb 8.9 oz (69.2 kg)   SpO2 97%   BMI 20.13 kg/m²      General appearance:  awake and verbally interactive; confused   HEENT: Head: normocephalic, atraumatic.   Neck: supple, symmetrical, trachea midline  Cardiovascular: normal S1 and S2  Pulmonary: diminished lung sounds bilaterally   Abdomen:  soft / non-tender   Extremities: Trace edema to bilateral lower legs     Patient Active Problem List:     Weight loss, unintentional     History of colon polyps     Pelvic mass    Assessment and Plan:    IMP:  1 arf from atn/pra/hydro  2 right side mass possible malignancy  3 hx prostate ca  4 hypernatremia   5 protein malnutrition with constipation  6 hx scleroderma    Plan   1.    -serum creatinine is overall stable  -uop: 750 ml in the last 24 hours   2.   -unable to complete right pelvic drain placement on 8/19; patient not  Able to sit still during the procedure.    -anticipate

## 2020-08-20 NOTE — DISCHARGE INSTR - COC
Continuity of Care Form    Patient Name: Brad Aaron   :  1940  MRN:  [id number]    Admit date:  2020  Discharge date:  ***    Code Status Order: Full Code   Advance Directives:   Advance Care Flowsheet Documentation     Date/Time Healthcare Directive Type of Healthcare Directive Copy in [address] Agent's Name Healthcare Agent's Phone Number    08/15/20 [phone]  Yes, patient has an advance directive for healthcare treatment  Living will  No, copy requested from family  --  --  --          Admitting Physician:  Jonathon Echevarria MD  PCP: Alie Rosario MD    Discharging Nurse: [facility] Unit/Room#: 4120/4120-A  Discharging Unit Phone Number: ***    Emergency Contact:   Extended Emergency Contact Information  Primary Emergency Contact: Yumiko Lindsey  Address: [address] [facility] [address]a [address]rigez [facility] [address], [facility] [address] [facility] [address] Phone: [phone]  Mobile Phone: [phone]  Relation: Spouse  Secondary Emergency Contact: Carlos Vicente  [place] Phone: [phone]  Mobile Phone: [phone]  Relation: Other  Preferred language: English    Past Surgical History:  Past Surgical History:   Procedure Laterality Date    COLONOSCOPY  3/8/13    Insuffieient prep    COLONOSCOPY  10/6/2015    diverticulosis, polyps x8, ext hem,    COLONOSCOPY  08/10/2018    Polyps x5, Int hemorrhoids, diverticulosis    ENDOSCOPY, COLON, DIAGNOSTIC  10/6/2015    mild erosive esophagitis    PROSTATECTOMY         Immunization History: There is no immunization history on file for this patient.     Active Problems:  Patient Active Problem List   Diagnosis Code    Weight loss, unintentional R63.4    History of colon polyps Z86.010    Pelvic mass R19.00       Isolation/Infection:   Isolation          Chemo        Patient Infection Status     Infection Onset Added Last Indicated Last Indicated By Review Planned Expiration Resolved Resolved By    None active Resolved    COVID-19 Rule Out 08/16/20 08/16/20 08/16/20 Covid-19 Ambulatory (Ordered)   08/17/20 Rule-Out Test Resulted          Nurse Assessment:  Last Vital Signs: /77   Pulse 90   Temp 98.1 °F (36.7 °C) (Oral)   Resp 17   Ht 6' 1\" (1.854 m)   Wt 152 lb 8.9 oz (69.2 kg)   SpO2 97%   BMI 20.13 kg/m²     Last documented pain score (0-10 scale): Pain Level: 7  Last Weight:   Wt Readings from Last 1 Encounters:   08/17/20 152 lb 8.9 oz (69.2 kg)     Mental Status:  {IP PT MENTAL STATUS:20030}    IV Access:  { [facility]O IV ACCESS:625492880}    Nursing Mobility/ADLs:  Walking   {P DME MMAH:066738779}  Transfer  {P DME AYIH:342734346}  Bathing  {P DME VJRQ:987139905}  Dressing  {[facility] DME DCJN:463917607}  Toileting  {[facility] DME YRWI:538086736}  Feeding  {[facility] DME NUUU:897584905}  Med Admin  {[facility] DME AEQC:748247270}  Med Delivery   { LEOBARDO MED Delivery:602590610}    Wound Care Documentation and Therapy:        Elimination:  Continence:   · Bowel: {YES / RC:85217}  · Bladder: {YES / IS:01181}  Urinary Catheter: {Urinary Catheter:193785780}   Colostomy/Ileostomy/Ileal Conduit: {YES / HN:26456}       Date of Last BM: ***    Intake/Output Summary (Last 24 hours) at 8/20/2020 1352  Last data filed at 8/20/2020 1138  Gross per 24 hour   Intake --   Output 1250 ml   Net -1250 ml     I/O last 3 completed shifts:  In: -   Out: 750 [Urine:750]    Safety Concerns:     [address] [employer] Safety Concerns:435413147}    Impairments/Disabilities:      [address] [employer] LEOBARDO Impairments/Disabilities:388273192}      Patient's personal belongings (please select all that are sent with patient):  {[facility] DME Belongings:693874157}    RN SIGNATURE:  {Esignature:195365090}    CASE MANAGEMENT/SOCIAL WORK SECTION    Inpatient Status Date: ***    Readmission Risk Assessment Score:  Readmission Risk              Risk of Unplanned Readmission:        11           Discharging to Facility/ Agency   · Name:   · Address:  · Phone:  · Fax:    Dialysis Facility (if applicable)   · Name:  · Address:  · Dialysis Schedule:  · Phone:  · Fax:    / signature: {Esignature:876467154}        PHYSICIAN SECTION      Nutrition Therapy:  Current Nutrition Therapy:   [address] LEOBARDO Diet List:226845987}    Routes of Feeding: {CHP DME Other Feedings:313440386}  Liquids: {Slp liquid thickness:34051}  Daily Fluid Restriction: {CHP DME Yes amt example:711587668}  Last Modified Barium Swallow with Video (Video Swallowing Test): {Done Not Done VZ:908978632}    Treatments at the Time of Hospital Discharge:   Respiratory Treatments: ***  Oxygen Therapy:  {Therapy; copd oxygen:70057}  Ventilator:    {[facility] Vent OKAI:040998433}    Rehab Therapies: {THERAPEUTIC INTERVENTION:4792916570}  Weight Bearing Status/Restrictions: {[facility] Weight Bearin}  Other Medical Equipment (for information only, NOT a DME order):  {EQUIPMENT:439471065}  Other Treatments: ***    Prognosis: {Prognosis:2505271467}    Condition at Discharge: [address] Patient Condition:179859780}    Rehab Potential (if transferring to Rehab): {Prognosis:0791934201}    Recommended Labs or Other Treatments After Discharge: ***    Physician Certification: I certify the above information and transfer of Anu Bertrand  is necessary for the continuing treatment of the diagnosis listed and that he requires {Admit to Appropriate Level of Care:00859} for {GREATER/LESS:835226497} 30 days.      Update Admission H&P: {CHP DME Changes in WXKD:483665376}    PHYSICIAN SIGNATURE:  {Esignature:403165864}

## 2020-08-20 NOTE — PROGRESS NOTES
Procedure: CT guided RLQ pelvic abscess drainage with Dr. Marta VEGA. Mac Sedation: Provided by Martha Angulo. See anesthesia chart. Outcome: 8 Fr pigtail. 10 ml of jamel pus, sent to lab. Orders received. Report called to [facility], [facility].. Transported to floor.     K[facility]by SNOWDEN IR

## 2020-08-20 NOTE — ANESTHESIA POSTPROCEDURE EVALUATION
Department of Anesthesiology  Postprocedure Note    Patient: Tanisha Pandey  MRN: [id number]  YOB: 1940  Date of evaluation: 8/20/2020  Time:  2:59 PM     Procedure Summary     Date:  08/20/20 Room / Location:  [address] [place] [address] CT Scan    Anesthesia Start:  6367 Anesthesia Stop:  1459    Procedure:  CT GUIDED PERCUT DRAINAGE CATH PLCNT, PERITONEAL OR RETRO Diagnosis:  (ABCESS)    Scheduled Providers:   Responsible Provider:  SURENDRA Harris CRNA    Anesthesia Type:  MAC, general ASA Status:  3          Anesthesia Type: MAC, general    Edie Phase I:      Edie Phase II:      Last vitals: Reviewed and per EMR flowsheets.        Anesthesia Post Evaluation    Patient location during evaluation: bedside  Patient participation: complete - patient participated  Level of consciousness: awake and alert  Pain score: 0  Airway patency: patent  Nausea & Vomiting: no vomiting and no nausea  Complications: no  Cardiovascular status: blood pressure returned to baseline and hemodynamically stable  Respiratory status: acceptable, room air, spontaneous ventilation and nonlabored ventilation  Hydration status: stable

## 2020-08-20 NOTE — PROGRESS NOTES
Physical Therapy  [facility] ACUTE CARE PHYSICAL THERAPY EVALUATION  Ameena Lindsey, 1940, 4120/4120-A, 8/20/2020    History  [place]:  The primary encounter diagnosis was Pelvic mass. Diagnoses of Constipation, unspecified constipation type, SLIM (acute kidney injury) ([address] Lower abdominal pain, and Other hydronephrosis were also pertinent to this visit. Patient  has a past medical history of Arthritis, Cancer ([address] Hyperlipidemia, and Scleroderma ([address] Patient  has a past surgical history that includes Prostatectomy (2011); Endoscopy, colon, diagnostic (10/6/2015); Colonoscopy (3/8/13); Colonoscopy (10/6/2015); and Colonoscopy (08/10/2018). Subjective:  Patient states:  \"Where is my wife\"   Pain: denied   Communication with other providers:  Handoff to RN, co-eval with [facility]   Restrictions: general precautions, falls     Home Setup/Prior level of function  Pt is a poor historian. Unable to answer social functional questions. Per charting, pt lives at home with his wife who is his primary caregiver. He has been using a WC lately (family pushes pt in the chair). Examination of body systems (includes body structures/functions, activity/participation limitations):  · Observation:  Supine in bed upon arrival. Lethargic and mumbled most of session. Kept eyes closed. Wet cough throughout but unable to bring anything up. RN aware. Cooperative with therapy though dec overall command following and active participation. · Vision:  Appeared intact   · Hearing:  [facility]   · Cardiopulmonary: stable vitals throughout session. · Orientation: oriented to self    Musculoskeletal  · ROM R/L: Unable to follow cues for AROM. Dec bilat knee extension and hip IR/adduction with PROM. Very rigid. · Strength R/L:  Unable to formally assess strength with MMT due to dec command following and overall arousal. Significant global strength impairments observed in function and endurance.    · Neuro:   Hx of PD    Mobility/treatment:   · Rolling L/R: NT   · Supine to sit:  maxA for bilat LE advancement and uprighting trunk. HOB elevated ~60deg    · Transfers: maxA x 2 squat pivot from bed to recliner   · Sitting balance:  Geovanny progressing to CGA at EOB statically x 5 minutes. Assist with hand placement on bed for supporting self. · Standing balance:  NT   · Gait: NT   · Educated pt on POC, role of PT. Cues for sequencing, posture, weight shift, UE/LE placement to inc safety and indep with mobility. [facility] 6 Clicks Inpatient Mobility:  AM-PAC Inpatient Mobility Raw Score : 8    Safety: patient left in chair with alarm, call light within reach, RN notified, gait belt used. Assessment:  Decreased functional mobility ; Decreased strength; Decreased endurance; Decreased posture; Decreased ADL status; Decreased coordination; Decreased safe awareness; Decreased balance; Decreased cognition; Decreased ROM  Pt is an 79year old male admitted with nausea, vomiting, and abdominal pain due to pelvic mass with right sided hydronephrosis. Hx of PD and prostate cancer w/ prostatectomy. Recommend subacute rehab trial once medically stable. Limited overall participation today with low arousal. Unsure of pts true baseline with no family present and pt being a poor historian. He presents with the above deficits and would benefit from SNF trial to inc safety, dec risk of fall, dec caregiver burden ,and restore function.    Complexity: High   Prognosis: Fair   Plan Times per week: 2+/week  Discharge Recommendations: Subacute/Skilled Nursing Facility  Equipment: If family decides to bring pt home vs SNF pt would need a WC, hospital bed, miracle, and possible BSC     Goals:  Short term goals  Time Frame for Short term goals: 1 week  Short term goal 1: Pt will roll bilat modA  Short term goal 2: Pt will transition sit><supine modA  Short term goal 3: Pt will transfer between surfaces maxA  Short term goal 4: Pt will perform light dynamic sitting activity with single UE support x 5 minutes CGA       Treatment plan:  Bed mobility, transfers, balance, gait, TA, TX,     Recommendations for NURSING mobility: squat pivot with 2 person or miracle     Time:   Time in: 0815  Time out: 0840  Timed treatment minutes: 15  Total time: 25    Electronically signed by:    Nahun Jarrett [id number]  8/20/2020, 12:00 PM

## 2020-08-20 NOTE — ANESTHESIA PRE PROCEDURE
Department of Anesthesiology  Preprocedure Note       Name:  Lorenza Vasquez   Age:  79 y.o.  :  1940                                          MRN:  [id number]         Date:  2020      Surgeon: * Surgery not found *    Procedure:     Medications prior to admission:   Prior to Admission medications    Medication Sig Start Date End Date Taking? Authorizing Provider   bicalutamide (CASODEX) 50 MG chemo tablet Take 50 mg by mouth daily   Yes Historical Provider, MD   rivastigmine (EXELON) 1.5 MG capsule Take 1.5 mg by mouth 2 times daily   Yes Historical Provider, MD   Cholecalciferol (VITAMIN D3) 5000 units TABS Take 10,000 Units by mouth every morning   Yes Historical Provider, MD   amantadine (SYMMETREL) 100 MG capsule Take 100 mg by mouth 2 times daily   Yes Historical Provider, MD   ROSUVASTATIN CALCIUM PO Take 40 mg by mouth every morning   Yes Historical Provider, MD   escitalopram (LEXAPRO) 10 MG tablet Take 10 mg by mouth nightly   Yes Historical Provider, MD   carbidopa-levodopa-entacapone (STALEVO 200) -200 MG TABS per tablet Take 1 tablet by mouth 4 times daily   Yes Historical Provider, MD   aspirin 81 MG tablet Take 81 mg by mouth daily.    Yes Historical Provider, MD   polyethylene glycol (GLYCOLAX) powder Take 17 g by mouth daily as needed    Historical Provider, MD   leuprolide (LUPRON DEPOT, 6-MONTH,) 45 MG injection Inject 45 mg into the muscle once    Historical Provider, MD   vitamin B-12 (CYANOCOBALAMIN) 1000 MCG tablet Take 5,000 mcg by mouth 2 times daily     Historical Provider, MD       Current medications:    Current Facility-Administered Medications   Medication Dose Route Frequency Provider Last Rate Last Dose    meropenem (MERREM) 1 g in sodium chloride 0.9 % 100 mL IVPB (mini-bag)  1 g Intravenous Q12H Trish Vivas MD        potassium chloride 10 mEq/100 mL IVPB (Peripheral Line)  10 mEq Intravenous PRN Trish Vivas  mL/hr at 20 1746 10 mEq at 08/17/20 1746    lidocaine 4 % external patch 1 patch  1 patch Transdermal Daily John Boyer MD   1 patch at 08/20/20 1028    amantadine (SYMMETREL) capsule 100 mg  100 mg Oral BID Katiana Wood MD   100 mg at 08/18/20 2131    aspirin chewable tablet 81 mg  81 mg Oral Daily Katiana Wood MD   81 mg at 08/18/20 1205    escitalopram (LEXAPRO) tablet 10 mg  10 mg Oral Nightly Katiana Wood MD   10 mg at 08/18/20 2131    rivastigmine (EXELON) capsule 1.5 mg  1.5 mg Oral BID Katiana Wood MD   1.5 mg at 08/18/20 2136    rosuvastatin (CRESTOR) tablet 40 mg  40 mg Oral QAM Katiana Wood MD   40 mg at 08/18/20 1205    bicalutamide (CASODEX) chemo tablet 50 mg  50 mg Oral Daily Katiana Wood MD   50 mg at 08/17/20 0928    0.9 % sodium chloride infusion   Intravenous Continuous Katiana Wood  mL/hr at 08/20/20 1014      sodium chloride flush 0.9 % injection 10 mL  10 mL Intravenous 2 times per day Katiana Wood MD   10 mL at 08/19/20 2123    sodium chloride flush 0.9 % injection 10 mL  10 mL Intravenous PRN Katiana Wood MD        acetaminophen (TYLENOL) tablet 650 mg  650 mg Oral Q6H PRN Katiana Wood MD   650 mg at 08/15/20 1600    Or    acetaminophen (TYLENOL) suppository 650 mg  650 mg Rectal Q6H PRN Katiana Wood MD   650 mg at 08/15/20 2311    polyethylene glycol (GLYCOLAX) packet 17 g  17 g Oral Daily PRN Katiana Wood MD        promethazine (PHENERGAN) tablet 12.5 mg  12.5 mg Oral Q6H PRN Katiana Wood MD        Or    ondansetron (ZOFRAN) injection 4 mg  4 mg Intravenous Q6H PRN Katiana Wood MD        enoxaparin (LOVENOX) injection 30 mg  30 mg Subcutaneous Daily Katiana Wood MD   30 mg at 08/18/20 0906    carbidopa-levodopa (SINEMET)  MG per tablet 2 tablet  2 tablet Oral 4x Daily Katiana Wood MD   2 tablet at 08/18/20 2131    And    entacapone (COMTAN) tablet 200 mg  200 mg Oral 4x Daily Katiana Wood MD   200 mg at 08/18/20 2131    morphine (PF) injection 2 mg  2 mg Intravenous Q4H PRN Nanette Neumann, APRN - CNP   2 mg at 08/20/20 1218       Allergies:  No Known Allergies    Problem List:    Patient Active Problem List   Diagnosis Code    Weight loss, unintentional R63.4    History of colon polyps Z86.010    Pelvic mass R19.00       Past Medical History:        Diagnosis Date    Arthritis     bilateral hands    Cancer [facility]     prostate    Hyperlipidemia     Scleroderma ([facility]     face skin is black in coloration       Past Surgical History:        Procedure Laterality Date    COLONOSCOPY  3/8/13    Insuffieient prep    COLONOSCOPY  10/6/2015    diverticulosis, polyps x8, ext hem,    COLONOSCOPY  08/10/2018    Polyps x5, Int hemorrhoids, diverticulosis    ENDOSCOPY, COLON, DIAGNOSTIC  10/6/2015    mild erosive esophagitis    PROSTATECTOMY  2011       Social History:    Social History     Tobacco Use    Smoking status: Never Smoker    Smokeless tobacco: Never Used   Substance Use Topics    Alcohol use: Yes     Comment: rarely                                Counseling given: Not Answered      Vital Signs (Current):   Vitals:    08/19/20 1704 08/19/20 2115 08/20/20 0530 08/20/20 0836   BP: 108/66 115/72 126/73 128/77   Pulse: 93 84 88 90   Resp: 16 30 22 17   Temp: 37.3 °C (99.2 °F) 36.8 °C (98.2 °F) 36.6 °C (97.8 °F) 36.7 °C (98.1 °F)   TempSrc: Oral Oral Oral Oral   SpO2: 95% 91% 97% 97%   Weight:       Height:                                                  BP Readings from Last 3 Encounters:   08/20/20 128/77   08/10/18 (!) 154/92   10/06/15 149/81       NPO Status:                                                                                 BMI:   Wt Readings from Last 3 Encounters:   08/17/20 152 lb 8.9 oz (69.2 kg)   08/10/18 172 lb 6.4 oz (78.2 kg)   10/06/15 158 lb (71.7 kg)     Body mass index is 20.13 kg/m².     CBC:   Lab Results   Component Value Date    WBC 9.8 08/20/2020    RBC 3.24 08/20/2020 HGB 9.7 08/20/2020    HCT 30.6 08/20/2020    MCV 94.4 08/20/2020    RDW 14.2 08/20/2020     08/20/2020       CMP:   Lab Results   Component Value Date     08/20/2020    K 3.7 08/20/2020     08/20/2020    CO2 21 08/20/2020    BUN 21 08/20/2020    CREATININE 1.4 08/20/2020    GFRAA 59 08/20/2020    LABGLOM 49 08/20/2020    GLUCOSE 83 08/20/2020    PROT 6.8 08/14/2020    CALCIUM 8.8 08/20/2020    BILITOT 0.4 08/14/2020    ALKPHOS 98 08/14/2020    AST 11 08/14/2020    ALT <5 08/14/2020       POC Tests: No results for input(s): POCGLU, POCNA, POCK, POCCL, POCBUN, POCHEMO, POCHCT in the last 72 hours. Coags:   Lab Results   Component Value Date    PROTIME 13.8 08/20/2020    INR 1.14 08/20/2020    APTT 45.1 08/20/2020       HCG (If Applicable): No results found for: PREGTESTUR, PREGSERUM, HCG, HCGQUANT     ABGs: No results found for: PHART, PO2ART, CSP5JRI, NMQ3GNQ, BEART, C2TJYESK     Type & Screen (If Applicable):  No results found for: LABABO, LABRH    Drug/Infectious Status (If Applicable):  No results found for: HIV, HEPCAB    COVID-19 Screening (If Applicable):   Lab Results   Component Value Date    COVID19 NOT DETECTED 08/16/2020         Anesthesia Evaluation  Patient summary reviewed and Nursing notes reviewed  Airway: Mallampati: II  TM distance: >3 FB   Neck ROM: full  Mouth opening: > = 3 FB Dental:          Pulmonary:Negative Pulmonary ROS and normal exam                               Cardiovascular:  Exercise tolerance: poor (<4 METS),           Rhythm: regular  Rate: normal                    Neuro/Psych:   (+) neuromuscular disease: Parkinson's disease,              ROS comment: Patient is confused and does not follow direction GI/Hepatic/Renal: Neg GI/Hepatic/Renal ROS            Endo/Other:    (+) : arthritis:., .                  ROS comment: Hx prostate CA s/p prostatectomy   Abdominal:           Vascular: negative vascular ROS.                                        Anesthesia Plan      MAC and general     ASA 3       Induction: intravenous. Anesthetic plan and risks discussed with spouse. Plan discussed with CRNA and attending.                   SURENDRA Arias - CRNA   8/20/2020

## 2020-08-20 NOTE — PLAN OF CARE
Problem: Falls - Risk of:  Goal: Will remain free from falls  Description: Will remain free from falls  Outcome: Ongoing  Goal: Absence of physical injury  Description: Absence of physical injury  Outcome: Ongoing     Problem: Skin Integrity:  Goal: Will show no infection signs and symptoms  Description: Will show no infection signs and symptoms  Outcome: Ongoing  Goal: Absence of new skin breakdown  Description: Absence of new skin breakdown  Outcome: Ongoing     Problem: Pain:  Goal: Pain level will decrease  Description: Pain level will decrease  Outcome: Ongoing  Goal: Control of acute pain  Description: Control of acute pain  Outcome: Ongoing  Goal: Control of chronic pain  Description: Control of chronic pain  Outcome: Ongoing     Problem: Gas Exchange - Impaired:  Goal: Levels of oxygenation will improve  Description: Levels of oxygenation will improve  Outcome: Ongoing     Problem: Urinary Elimination:  Goal: Signs and symptoms of infection will decrease  Description: Signs and symptoms of infection will decrease  Outcome: Ongoing  Goal: Complications related to the disease process, condition or treatment will be avoided or minimized  Description: Complications related to the disease process, condition or treatment will be avoided or minimized  Outcome: Ongoing  Goal: Skin integrity will improve  Description: Skin integrity will improve  Outcome: Ongoing  Goal: Ability to recognize the need to void and respond appropriately will improve  Description: Ability to recognize the need to void and respond appropriately will improve  Outcome: Ongoing  Goal: Will remain free from infection  Description: Will remain free from infection  Outcome: Ongoing  Goal: Incidence of incontinence will decrease  Description: Incidence of incontinence will decrease  Outcome: Ongoing     Problem: Health Behavior:  Goal: Compliance with treatment plan for underlying cause of condition will improve  Description: Compliance with treatment plan for underlying cause of condition will improve  Outcome: Ongoing  Goal: Identification of resources available to assist in meeting health care needs will improve  Description: Identification of resources available to assist in meeting health care needs will improve  Outcome: Ongoing     Problem: Fluid Volume:  Goal: Maintenance of adequate hydration will improve  Description: Maintenance of adequate hydration will improve  Outcome: Ongoing

## 2020-08-20 NOTE — PROGRESS NOTES
Occupational [address] [place] [address] CARE OCCUPATIONAL THERAPY EVALUATION  Melecio Lindsey, 1940, 4120/4120-A, 8/20/2020    History  [place]:  The primary encounter diagnosis was Pelvic mass. Diagnoses of Constipation, unspecified constipation type, SLIM (acute kidney injury) ([address] Lower abdominal pain, and Other hydronephrosis were also pertinent to this visit. Patient  has a past medical history of Arthritis, Cancer ([address] Hyperlipidemia, and Scleroderma ([address] Patient  has a past surgical history that includes Prostatectomy (2011); Endoscopy, colon, diagnostic (10/6/2015); Colonoscopy (3/8/13); Colonoscopy (10/6/2015); and Colonoscopy (08/10/2018). Subjective:  Patient states:  Pt difficulty keeping eyes open throughout session. Pain:  No pain symptoms throughout session. Communication with other providers:  Handoff to RN, co-eval with PT. Restrictions: General Precautions, Fall Risk    Home Setup/Prior level of function   Pt is a poor historian and unable to give PLOF. Per case management note pt lives at home w/ spouse who is primary caregiver. Pt uses wheelchair, walker, grab bars and lift chair to assist w/ functional mobility/transfers.      Examination of body systems (includes body structures/functions, activity/participation limitations):  · Observation:  Supine in bed upon arrival  · Vision:  Difficulty keeping eyes open  · Hearing:  Appears WFL  · Cardiopulmonary:  2L of 02      Body Systems and functions:  · ROM R/L:  ~90 degrees shoulder flexion bilaterally, distal WFL    · Strength R/L:  2+/5,   · Sensation: WFL  · Tone: Normal  · Coordination: Decreased gross/fine motor coordination  · Perception: Decreased initiation/sequencing requiring hand over hand to overcome    Activities of Daily Living (ADLs):  · Feeding: maxA  · Grooming: maxA (washing face w/ warm washcloth)  · UB bathing: maxA  · LB bathing: dependent/total  · UB dressing: maxA  · LB dressing: dependent/total  · Toileting: dependent/total    Cognitive and Psychosocial Functioning:  · Overall cognitive status: Difficulty keeping eyes open, responding to simple questions with one word answers after extensive cuing, increased processing time required. Dementia at baseline  · Affect: Drowsy and confused       Mobility:  · Supine to sit:  maxA  · Transfers: maxA x2 STS and then stand pivot from EOB to reclining chair  · Sitting balance:  CGA. · Standing balance:  maxA. · Toilet/Shower Transfers: DNT             AM-PAC Daily Activity Inpatient   How much help for putting on and taking off regular lower body clothing?: Total  How much help for Bathing?: Total  How much help for Toileting?: Total  How much help for putting on and taking off regular upper body clothing?: Total  How much help for taking care of personal grooming?: Total  How much help for eating meals?: Total  AM-PAC Inpatient Daily Activity Raw Score: 6  AM-PAC Inpatient ADL T-Scale Score : 17.07  ADL Inpatient CMS 0-100% Score: 100  ADL Inpatient CMS G-Code Modifier : CN    Treatment:  Self Care Training:   Cues were given for safety, sequence, UE/LE placement, visual cues, and balance. Activities performed today included grooming      Safety: patient left in chair with chair alarm, call light within reach, RN notified, gait belt used. Assessment:  Pt is a 78 yo male admitted from home for pelvis mass. Pt currently presents w/ deficits in ADL and high level IADL independence, functional activity tolerance, dynamic sitting and standing balance and tolerance and functional transfers, BUE strength/ROM, initiation/sequencing, gross/fine motor coordination and cognition. Pt would benefit from continued acute care OT services w/ discharge to SNF.  If pt's wife wishes to take pt home pt must have DENNYS and hospital bed as pt displays decreased functional tranfers/mobility at this time  Complexity: High  Prognosis: Good, no significant barriers to participation at this time.    Plan  Times per week: 2x+  Times per day: Daily  Current Treatment Recommendations: Strengthening, Endurance Training, Patient/Caregiver Education & Training, ROM, Cognitive Reorientation, Self-Care / ADL, Equipment Evaluation, Education, & procurement, Balance Training, Pain Management, Cognitive/Perceptual Training, Home Management Training, Functional Mobility Training, Safety Education & Training, Positioning     Equipment: defer    Goals:  Pt goal: go home  Time Frame for STGs: discharge  Goal 1: Pt will perform UE ADLs Supervision  Goal 2: Pt will perform LE ADLs Geovanny w/ AD  Goal 3: Pt will perform toileting Geovanny  Goal 4: Pt will perform functional transfer w/ AD Geovanny  Goal 5: Pt will perform functional mobility w/ AD Geovanny  Goal 6: Pt will perform therex/theract in order to increase functional activity tolerance and dynamic standing balance    Treatment plan:  Pt will perform functional task in sit reaching in all 3 planes in order to increase dynamic sitting balance and functional activity tolerance    Recommendations for NURSING activity: DENNYS to chair  Time:   Time in: 0815  Time out: 0839  Timed treatment minutes: 9 minutes  Total time: 24 minutes    Electronically signed by:    Yuki TONG/L [id number]  9:58 AM,8/20/2020

## 2020-08-20 NOTE — CARE COORDINATION
Therapy worked with pt today and rec'd SNF [facility]. Referral called and faxed to [facility] [phone]. I had to leave  for admissions, waiting return call.

## 2020-08-20 NOTE — PROGRESS NOTES
GENERAL SURGERY PROGRESS NOTE    CC/HPI:           Patient feels the same. I discussed the case with radiologist again yesterday; IR, and today's Biopsies, after CTA suggested perforated appy. Vitals:    08/19/20 1704 08/19/20 2115 08/20/20 0530 08/20/20 0836   BP: 108/66 115/72 126/73 128/77   Pulse: 93 84 88 90   Resp: 16 30 22 17   Temp: 99.2 °F (37.3 °C) 98.2 °F (36.8 °C) 97.8 °F (36.6 °C) 98.1 °F (36.7 °C)   TempSrc: Oral Oral Oral Oral   SpO2: 95% 91% 97% 97%   Weight:       Height:         I/O last 3 completed shifts:  In: -   Out: 750 [Urine:750]  No intake/output data recorded.     Dietary Nutrition Supplements: Renal Oral Supplement  Diet NPO, After Midnight    Recent Results (from the past 50 hour(s))   Protime-INR    Collection Time: 08/19/20  5:08 AM   Result Value Ref Range    Protime 14.7 (H) 11.7 - 14.5 SECONDS    INR 1.21 INDEX   APTT    Collection Time: 08/19/20  5:08 AM   Result Value Ref Range    aPTT 41.1 (H) 25.1 - 37.1 SECONDS   Fibrinogen    Collection Time: 08/19/20  5:08 AM   Result Value Ref Range    Fibrinogen 658 (H) 196.9 - 442.1 MG/DL   D-Dimer, Quantitative    Collection Time: 08/19/20  5:08 AM   Result Value Ref Range    D-Dimer, Quant 1253 (H) <230 NG/mL(DDU)   CBC Auto Differential    Collection Time: 08/19/20  5:08 AM   Result Value Ref Range    WBC 13.1 (H) 4.0 - 10.5 K/CU MM    RBC 3.57 (L) 4.6 - 6.2 M/CU MM    Hemoglobin 10.7 (L) 13.5 - 18.0 GM/DL    Hematocrit 34.0 (L) 42 - 52 %    MCV 95.2 78 - 100 FL    MCH 30.0 27 - 31 PG    MCHC 31.5 (L) 32.0 - 36.0 %    RDW 13.9 11.7 - 14.9 %    Platelets 712 859 - 527 K/CU MM    MPV 9.2 7.5 - 11.1 FL    Differential Type AUTOMATED DIFFERENTIAL     Segs Relative 88.0 (H) 36 - 66 %    Lymphocytes % 5.5 (L) 24 - 44 %    Monocytes % 5.4 (H) 0 - 4 %    Eosinophils % 0.2 0 - 3 %    Basophils % 0.2 0 - 1 %    Segs Absolute 11.5 K/CU MM    Lymphocytes Absolute 0.7 K/CU MM    Monocytes Absolute 0.7 K/CU MM    Eosinophils Absolute 0.0 K/CU MM    Basophils Absolute 0.0 K/CU MM    Nucleated RBC % 0.0 %    Total Nucleated RBC 0.0 K/CU MM    Total Immature Neutrophil 0.09 K/CU MM    Immature Neutrophil % 0.7 (H) 0 - 0.43 %   Basic metabolic panel    Collection Time: 08/19/20  5:08 AM   Result Value Ref Range    Sodium 137 135 - 145 MMOL/L    Potassium 3.6 3.5 - 5.1 MMOL/L    Chloride 105 99 - 110 mMol/L    CO2 21 21 - 32 MMOL/L    Anion Gap 11 4 - 16    BUN 19 6 - 23 MG/DL    CREATININE 1.3 0.9 - 1.3 MG/DL    Glucose 94 70 - 99 MG/DL    Calcium 8.7 8.3 - 10.6 MG/DL    GFR Non- 53 (L) >60 mL/min/1.73m2    GFR African American >60 >60 mL/min/1.73m2   Magnesium    Collection Time: 08/19/20  5:08 AM   Result Value Ref Range    Magnesium 2.0 1.8 - 2.4 mg/dl   Protime-INR    Collection Time: 08/20/20  5:31 AM   Result Value Ref Range    Protime 13.8 11.7 - 14.5 SECONDS    INR 1.14 INDEX   APTT    Collection Time: 08/20/20  5:31 AM   Result Value Ref Range    aPTT 45.1 (H) 25.1 - 37.1 SECONDS   Fibrinogen    Collection Time: 08/20/20  5:31 AM   Result Value Ref Range    Fibrinogen 682 (H) 196.9 - 442.1 MG/DL   D-Dimer, Quantitative    Collection Time: 08/20/20  5:31 AM   Result Value Ref Range    D-Dimer, Quant 2097 (H) <230 NG/mL(DDU)   CBC Auto Differential    Collection Time: 08/20/20  5:31 AM   Result Value Ref Range    WBC 9.8 4.0 - 10.5 K/CU MM    RBC 3.24 (L) 4.6 - 6.2 M/CU MM    Hemoglobin 9.7 (L) 13.5 - 18.0 GM/DL    Hematocrit 30.6 (L) 42 - 52 %    MCV 94.4 78 - 100 FL    MCH 29.9 27 - 31 PG    MCHC 31.7 (L) 32.0 - 36.0 %    RDW 14.2 11.7 - 14.9 %    Platelets 638 217 - 908 K/CU MM    MPV 9.5 7.5 - 11.1 FL    Differential Type AUTOMATED DIFFERENTIAL     Segs Relative 84.5 (H) 36 - 66 %    Lymphocytes % 7.8 (L) 24 - 44 %    Monocytes % 6.1 (H) 0 - 4 %    Eosinophils % 0.7 0 - 3 %    Basophils % 0.2 0 - 1 %    Segs Absolute 8.3 K/CU MM    Lymphocytes Absolute 0.8 K/CU MM    Monocytes Absolute 0.6 K/CU MM    Eosinophils Absolute 0.1 K/CU MM    Basophils Absolute 0.0 K/CU MM    Nucleated RBC % 0.0 %    Total Nucleated RBC 0.0 K/CU MM    Total Immature Neutrophil 0.07 K/CU MM    Immature Neutrophil % 0.7 (H) 0 - 0.43 %   Basic metabolic panel    Collection Time: 08/20/20  5:31 AM   Result Value Ref Range    Sodium 146 (H) 135 - 145 MMOL/L    Potassium 3.7 3.5 - 5.1 MMOL/L    Chloride 114 (H) 99 - 110 mMol/L    CO2 21 21 - 32 MMOL/L    Anion Gap 11 4 - 16    BUN 21 6 - 23 MG/DL    CREATININE 1.4 (H) 0.9 - 1.3 MG/DL    Glucose 83 70 - 99 MG/DL    Calcium 8.8 8.3 - 10.6 MG/DL    GFR Non- 49 (L) >60 mL/min/1.73m2    GFR  59 (L) >60 mL/min/1.73m2   Magnesium    Collection Time: 08/20/20  5:31 AM   Result Value Ref Range    Magnesium 2.0 1.8 - 2.4 mg/dl       Scheduled Meds:   meropenem  1 g Intravenous Q8H    lidocaine  1 patch Transdermal Daily    amantadine  100 mg Oral BID    aspirin  81 mg Oral Daily    escitalopram  10 mg Oral Nightly    rivastigmine  1.5 mg Oral BID    rosuvastatin  40 mg Oral QAM    bicalutamide  50 mg Oral Daily    sodium chloride flush  10 mL Intravenous 2 times per day    enoxaparin  30 mg Subcutaneous Daily    carbidopa-levodopa  2 tablet Oral 4x Daily    And    entacapone  200 mg Oral 4x Daily       Continuous Infusions:   sodium chloride 100 mL/hr at 08/19/20 2123       Physical Exam:  HEENT: Anicteric sclerae, Oropharyngeal mucosae moist, pink and intact. Heart:  Normal S1 and S2, RRR  Lungs: Clear to auscultation bilaterally, No audible Wheezes or Rales. Extremities: No edema. Neuro: Alert and Oriented x 3, Non focal.  Abdomen: Soft, Benign, not MILDLY tender lower abdomen. ., Non distended, Positive bowel sounds. Active Problems:    Pelvic mass  Resolved Problems:    * No resolved hospital problems.  *      Assessment and Plan:  CT the day before yesterday:  Impression    Right pelvic mass.  Differential diagnosis includes subacute hematoma or    infiltrative malignancy.         Mild right hydronephrosis due to compression of the ureter by the mass.         Bibasilar pulmonary consolidation or atelectasis.       CT yesterday:  Impression    1. There is a complex 5.5 x 4.7 cm collection in the right pelvis with    surrounding mild peripheral enhancement extends into the right iliopsoas    muscle and likely abuts and encases the right external iliac vessels. Dawood Sos    is also obstruction of the right ureter resulting in mild    hydroureteronephrosis. Dawood Sos is a fluid-filled thick-walled structure    extending from the cecum towards this collection raising suspicion for    perforated appendicitis with associated phlegmon/abscess.  Less likely    considerations include hematoma or malignancy either metastatic disease or    lymphoma. 2. Status post prostatectomy.  Otherwise no obvious urinary tract filling    defect or calculus given limitations due to motion artifact. Results were called by Dr. Era Farr. Oumar Apple MD to [facility] Stevens on    8/18/2020 at 14:15. Diagnosis:      Patient Active Problem List   Diagnosis    Weight loss, unintentional    History of colon polyps    Pelvic mass        Assessment & plan:  Marry Cohn is a very pleasant 79 y.o. male presenting with a pelvic mass, ? Perforated appendicitis? .     Percutaneous biopsy I recommend first, and then I'll manage according to its result.     Waiting for IR to proceed with biopsies, probably today, with anesthesia.    ___________________________________________    Gilles Maldonado MD, FACS, FICS  8/20/2020  9:34 AM

## 2020-08-21 ENCOUNTER — APPOINTMENT (OUTPATIENT)
Dept: GENERAL RADIOLOGY | Age: 80
DRG: 371 | End: 2020-08-21
Payer: MEDICARE

## 2020-08-21 LAB
ANION GAP SERPL CALCULATED.3IONS-SCNC: 12 MMOL/L (ref 4–16)
APTT: 33 SECONDS (ref 25.1–37.1)
BASOPHILS ABSOLUTE: 0 K/CU MM
BASOPHILS RELATIVE PERCENT: 0.4 % (ref 0–1)
BUN BLDV-MCNC: 20 MG/DL (ref 6–23)
CALCIUM SERPL-MCNC: 8.7 MG/DL (ref 8.3–10.6)
CHLORIDE BLD-SCNC: 110 MMOL/L (ref 99–110)
CO2: 22 MMOL/L (ref 21–32)
CREAT SERPL-MCNC: 1.2 MG/DL (ref 0.9–1.3)
CULTURE: NORMAL
CULTURE: NORMAL
D DIMER: 530 NG/ML(DDU)
DIFFERENTIAL TYPE: ABNORMAL
EOSINOPHILS ABSOLUTE: 0.1 K/CU MM
EOSINOPHILS RELATIVE PERCENT: 1.3 % (ref 0–3)
FIBRINOGEN LEVEL: 750 MG/DL (ref 196.9–442.1)
GFR AFRICAN AMERICAN: >60 ML/MIN/1.73M2
GFR NON-AFRICAN AMERICAN: 58 ML/MIN/1.73M2
GLUCOSE BLD-MCNC: 86 MG/DL (ref 70–99)
HCT VFR BLD CALC: 31.4 % (ref 42–52)
HEMOGLOBIN: 9.9 GM/DL (ref 13.5–18)
HIGH SENSITIVE C-REACTIVE PROTEIN: 266.8 MG/L
IMMATURE NEUTROPHIL %: 1.2 % (ref 0–0.43)
INR BLD: 1.14 INDEX
LYMPHOCYTES ABSOLUTE: 1 K/CU MM
LYMPHOCYTES RELATIVE PERCENT: 12.3 % (ref 24–44)
Lab: NORMAL
Lab: NORMAL
MAGNESIUM: 1.9 MG/DL (ref 1.8–2.4)
MCH RBC QN AUTO: 30.2 PG (ref 27–31)
MCHC RBC AUTO-ENTMCNC: 31.5 % (ref 32–36)
MCV RBC AUTO: 95.7 FL (ref 78–100)
MONOCYTES ABSOLUTE: 0.5 K/CU MM
MONOCYTES RELATIVE PERCENT: 5.8 % (ref 0–4)
NUCLEATED RBC %: 0 %
PDW BLD-RTO: 14.6 % (ref 11.7–14.9)
PLATELET # BLD: 283 K/CU MM (ref 140–440)
PMV BLD AUTO: 9.2 FL (ref 7.5–11.1)
POTASSIUM SERPL-SCNC: 3.6 MMOL/L (ref 3.5–5.1)
PRO-BNP: 3152 PG/ML
PROCALCITONIN: 0.31
PROTHROMBIN TIME: 13.8 SECONDS (ref 11.7–14.5)
RBC # BLD: 3.28 M/CU MM (ref 4.6–6.2)
SEGMENTED NEUTROPHILS ABSOLUTE COUNT: 6.7 K/CU MM
SEGMENTED NEUTROPHILS RELATIVE PERCENT: 79 % (ref 36–66)
SODIUM BLD-SCNC: 144 MMOL/L (ref 135–145)
SPECIMEN: NORMAL
SPECIMEN: NORMAL
TOTAL IMMATURE NEUTOROPHIL: 0.1 K/CU MM
TOTAL NUCLEATED RBC: 0 K/CU MM
WBC # BLD: 8.5 K/CU MM (ref 4–10.5)

## 2020-08-21 PROCEDURE — 1200000000 HC SEMI PRIVATE

## 2020-08-21 PROCEDURE — 6370000000 HC RX 637 (ALT 250 FOR IP): Performed by: HOSPITALIST

## 2020-08-21 PROCEDURE — 94761 N-INVAS EAR/PLS OXIMETRY MLT: CPT

## 2020-08-21 PROCEDURE — 85025 COMPLETE CBC W/AUTO DIFF WBC: CPT

## 2020-08-21 PROCEDURE — 85384 FIBRINOGEN ACTIVITY: CPT

## 2020-08-21 PROCEDURE — 85379 FIBRIN DEGRADATION QUANT: CPT

## 2020-08-21 PROCEDURE — 36415 COLL VENOUS BLD VENIPUNCTURE: CPT

## 2020-08-21 PROCEDURE — 2580000003 HC RX 258: Performed by: INTERNAL MEDICINE

## 2020-08-21 PROCEDURE — 71045 X-RAY EXAM CHEST 1 VIEW: CPT

## 2020-08-21 PROCEDURE — 6360000002 HC RX W HCPCS: Performed by: INTERNAL MEDICINE

## 2020-08-21 PROCEDURE — 83880 ASSAY OF NATRIURETIC PEPTIDE: CPT

## 2020-08-21 PROCEDURE — 2580000003 HC RX 258: Performed by: RADIOLOGY

## 2020-08-21 PROCEDURE — 99232 SBSQ HOSP IP/OBS MODERATE 35: CPT | Performed by: SURGERY

## 2020-08-21 PROCEDURE — 86141 C-REACTIVE PROTEIN HS: CPT

## 2020-08-21 PROCEDURE — 6370000000 HC RX 637 (ALT 250 FOR IP): Performed by: INTERNAL MEDICINE

## 2020-08-21 PROCEDURE — 84145 PROCALCITONIN (PCT): CPT

## 2020-08-21 PROCEDURE — 80048 BASIC METABOLIC PNL TOTAL CA: CPT

## 2020-08-21 PROCEDURE — 85730 THROMBOPLASTIN TIME PARTIAL: CPT

## 2020-08-21 PROCEDURE — 85610 PROTHROMBIN TIME: CPT

## 2020-08-21 PROCEDURE — 83735 ASSAY OF MAGNESIUM: CPT

## 2020-08-21 PROCEDURE — 2700000000 HC OXYGEN THERAPY PER DAY

## 2020-08-21 RX ORDER — FUROSEMIDE 10 MG/ML
40 INJECTION INTRAMUSCULAR; INTRAVENOUS ONCE
Status: COMPLETED | OUTPATIENT
Start: 2020-08-21 | End: 2020-08-21

## 2020-08-21 RX ORDER — AMLODIPINE BESYLATE 5 MG/1
5 TABLET ORAL DAILY
Status: DISCONTINUED | OUTPATIENT
Start: 2020-08-21 | End: 2020-08-30 | Stop reason: HOSPADM

## 2020-08-21 RX ORDER — CLONIDINE HYDROCHLORIDE 0.1 MG/1
0.1 TABLET ORAL 3 TIMES DAILY PRN
Status: DISCONTINUED | OUTPATIENT
Start: 2020-08-21 | End: 2020-08-30 | Stop reason: HOSPADM

## 2020-08-21 RX ADMIN — BICALUTAMIDE 50 MG: 50 TABLET ORAL at 10:40

## 2020-08-21 RX ADMIN — ROSUVASTATIN 40 MG: 40 TABLET, FILM COATED ORAL at 10:38

## 2020-08-21 RX ADMIN — SODIUM CHLORIDE: 9 INJECTION, SOLUTION INTRAVENOUS at 00:12

## 2020-08-21 RX ADMIN — MEROPENEM 1 G: 1 INJECTION, POWDER, FOR SOLUTION INTRAVENOUS at 21:18

## 2020-08-21 RX ADMIN — AMLODIPINE BESYLATE 5 MG: 5 TABLET ORAL at 10:38

## 2020-08-21 RX ADMIN — CARBIDOPA AND LEVODOPA 2 TABLET: 25; 100 TABLET ORAL at 20:19

## 2020-08-21 RX ADMIN — CARBIDOPA AND LEVODOPA 2 TABLET: 25; 100 TABLET ORAL at 16:27

## 2020-08-21 RX ADMIN — RIVASTIGMINE TARTRATE 1.5 MG: 1.5 CAPSULE ORAL at 20:20

## 2020-08-21 RX ADMIN — CARBIDOPA AND LEVODOPA 2 TABLET: 25; 100 TABLET ORAL at 10:37

## 2020-08-21 RX ADMIN — SODIUM CHLORIDE, PRESERVATIVE FREE 5 ML: 5 INJECTION INTRAVENOUS at 20:20

## 2020-08-21 RX ADMIN — RIVASTIGMINE TARTRATE 1.5 MG: 1.5 CAPSULE ORAL at 10:37

## 2020-08-21 RX ADMIN — FUROSEMIDE 40 MG: 10 INJECTION, SOLUTION INTRAMUSCULAR; INTRAVENOUS at 16:26

## 2020-08-21 RX ADMIN — MEROPENEM 1 G: 1 INJECTION, POWDER, FOR SOLUTION INTRAVENOUS at 00:12

## 2020-08-21 RX ADMIN — SODIUM CHLORIDE, PRESERVATIVE FREE 10 ML: 5 INJECTION INTRAVENOUS at 20:20

## 2020-08-21 RX ADMIN — SODIUM CHLORIDE, PRESERVATIVE FREE 5 ML: 5 INJECTION INTRAVENOUS at 11:00

## 2020-08-21 RX ADMIN — ENTACAPONE 200 MG: 200 TABLET, FILM COATED ORAL at 16:27

## 2020-08-21 RX ADMIN — AMANTADINE HYDROCHLORIDE 100 MG: 100 CAPSULE, LIQUID FILLED ORAL at 20:19

## 2020-08-21 RX ADMIN — MEROPENEM 1 G: 1 INJECTION, POWDER, FOR SOLUTION INTRAVENOUS at 10:39

## 2020-08-21 RX ADMIN — SODIUM CHLORIDE, PRESERVATIVE FREE 10 ML: 5 INJECTION INTRAVENOUS at 10:38

## 2020-08-21 RX ADMIN — ENOXAPARIN SODIUM 30 MG: 30 INJECTION SUBCUTANEOUS at 10:38

## 2020-08-21 RX ADMIN — ASPIRIN 81 MG CHEWABLE TABLET 81 MG: 81 TABLET CHEWABLE at 10:37

## 2020-08-21 RX ADMIN — AMANTADINE HYDROCHLORIDE 100 MG: 100 CAPSULE, LIQUID FILLED ORAL at 10:59

## 2020-08-21 RX ADMIN — SODIUM CHLORIDE, PRESERVATIVE FREE 5 ML: 5 INJECTION INTRAVENOUS at 00:13

## 2020-08-21 RX ADMIN — ENTACAPONE 200 MG: 200 TABLET, FILM COATED ORAL at 20:20

## 2020-08-21 RX ADMIN — ENTACAPONE 200 MG: 200 TABLET, FILM COATED ORAL at 10:39

## 2020-08-21 RX ADMIN — ESCITALOPRAM OXALATE 10 MG: 10 TABLET ORAL at 20:19

## 2020-08-21 NOTE — CARE COORDINATION
I spoke with April Rose at [facility] yesterday and she states the fax did not come through entirely and requested I refax today. I refaxed everything this am and called and spoke with Marlon Hastings to confirm they have received and they are reviewing. Pt not anticipated to be medically ready for discharge this weekend.

## 2020-08-21 NOTE — PROGRESS NOTES
GENERAL SURGERY PROGRESS NOTE    CC/HPI:           Patient feels the same. . nonverbal..    Vitals:    08/20/20 1645 08/20/20 2018 08/21/20 0541 08/21/20 0858   BP: (!) 157/85 133/71 (!) 161/78 (!) 181/102   Pulse: 91 89 89 86   Resp: 20 18 20 23   Temp: 98.4 °F (36.9 °C) 98.7 °F (37.1 °C) 98.6 °F (37 °C) 97.1 °F (36.2 °C)   TempSrc: Oral Oral Oral Axillary   SpO2: 98% 97% 99% 91%   Weight:   156 lb (70.8 kg)    Height:         I/O last 3 completed shifts: In: 1400 [I.V.:1300; IV Piggyback:100]  Out: 1360 [Urine:1350; Drains:10]  No intake/output data recorded.     Dietary Nutrition Supplements: Renal Oral Supplement  Diet NPO, After Midnight    Recent Results (from the past 50 hour(s))   Protime-INR    Collection Time: 08/20/20  5:31 AM   Result Value Ref Range    Protime 13.8 11.7 - 14.5 SECONDS    INR 1.14 INDEX   APTT    Collection Time: 08/20/20  5:31 AM   Result Value Ref Range    aPTT 45.1 (H) 25.1 - 37.1 SECONDS   Fibrinogen    Collection Time: 08/20/20  5:31 AM   Result Value Ref Range    Fibrinogen 682 (H) 196.9 - 442.1 MG/DL   D-Dimer, Quantitative    Collection Time: 08/20/20  5:31 AM   Result Value Ref Range    D-Dimer, Quant 2097 (H) <230 NG/mL(DDU)   CBC Auto Differential    Collection Time: 08/20/20  5:31 AM   Result Value Ref Range    WBC 9.8 4.0 - 10.5 K/CU MM    RBC 3.24 (L) 4.6 - 6.2 M/CU MM    Hemoglobin 9.7 (L) 13.5 - 18.0 GM/DL    Hematocrit 30.6 (L) 42 - 52 %    MCV 94.4 78 - 100 FL    MCH 29.9 27 - 31 PG    MCHC 31.7 (L) 32.0 - 36.0 %    RDW 14.2 11.7 - 14.9 %    Platelets 259 754 - 943 K/CU MM    MPV 9.5 7.5 - 11.1 FL    Differential Type AUTOMATED DIFFERENTIAL     Segs Relative 84.5 (H) 36 - 66 %    Lymphocytes % 7.8 (L) 24 - 44 %    Monocytes % 6.1 (H) 0 - 4 %    Eosinophils % 0.7 0 - 3 %    Basophils % 0.2 0 - 1 %    Segs Absolute 8.3 K/CU MM    Lymphocytes Absolute 0.8 K/CU MM    Monocytes Absolute 0.6 K/CU MM    Eosinophils Absolute 0.1 K/CU MM    Basophils Absolute 0.0 K/CU MM Type AUTOMATED DIFFERENTIAL     Segs Relative 79.0 (H) 36 - 66 %    Lymphocytes % 12.3 (L) 24 - 44 %    Monocytes % 5.8 (H) 0 - 4 %    Eosinophils % 1.3 0 - 3 %    Basophils % 0.4 0 - 1 %    Segs Absolute 6.7 K/CU MM    Lymphocytes Absolute 1.0 K/CU MM    Monocytes Absolute 0.5 K/CU MM    Eosinophils Absolute 0.1 K/CU MM    Basophils Absolute 0.0 K/CU MM    Nucleated RBC % 0.0 %    Total Nucleated RBC 0.0 K/CU MM    Total Immature Neutrophil 0.10 K/CU MM    Immature Neutrophil % 1.2 (H) 0 - 0.43 %   Basic metabolic panel    Collection Time: 08/21/20  6:17 AM   Result Value Ref Range    Sodium 144 135 - 145 MMOL/L    Potassium 3.6 3.5 - 5.1 MMOL/L    Chloride 110 99 - 110 mMol/L    CO2 22 21 - 32 MMOL/L    Anion Gap 12 4 - 16    BUN 20 6 - 23 MG/DL    CREATININE 1.2 0.9 - 1.3 MG/DL    Glucose 86 70 - 99 MG/DL    Calcium 8.7 8.3 - 10.6 MG/DL    GFR Non- 58 (L) >60 mL/min/1.73m2    GFR African American >60 >60 mL/min/1.73m2   Magnesium    Collection Time: 08/21/20  6:17 AM   Result Value Ref Range    Magnesium 1.9 1.8 - 2.4 mg/dl   Procalcitonin    Collection Time: 08/21/20  6:17 AM   Result Value Ref Range    Procalcitonin 0.308        Scheduled Meds:   meropenem  1 g Intravenous Q12H    sodium chloride flush  5 mL Intracatheter BID    lidocaine  1 patch Transdermal Daily    amantadine  100 mg Oral BID    aspirin  81 mg Oral Daily    escitalopram  10 mg Oral Nightly    rivastigmine  1.5 mg Oral BID    rosuvastatin  40 mg Oral QAM    bicalutamide  50 mg Oral Daily    sodium chloride flush  10 mL Intravenous 2 times per day    enoxaparin  30 mg Subcutaneous Daily    carbidopa-levodopa  2 tablet Oral 4x Daily    And    entacapone  200 mg Oral 4x Daily       Continuous Infusions:   sodium chloride 100 mL/hr at 08/21/20 0012       Physical Exam:  HEENT: Anicteric sclerae, Oropharyngeal mucosae moist, pink and intact.   Heart:  Normal S1 and S2, RRR  Lungs: Clear to auscultation bilaterally, No audible Wheezes or Rales. Extremities: No edema. Neuro: Alert and Oriented x 3, Non focal.  Abdomen: Soft, Benign, not MILDLY tender lower abdomen. ., Non distended, Positive bowel sounds. Active Problems:    Pelvic mass  Resolved Problems:    * No resolved hospital problems. *      Assessment and Plan:  CT the day before yesterday:  Impression    Right pelvic mass.  Differential diagnosis includes subacute hematoma or    infiltrative malignancy.         Mild right hydronephrosis due to compression of the ureter by the mass.         Bibasilar pulmonary consolidation or atelectasis.       CT yesterday:  Impression    1. There is a complex 5.5 x 4.7 cm collection in the right pelvis with    surrounding mild peripheral enhancement extends into the right iliopsoas    muscle and likely abuts and encases the right external iliac vessels. Gearline Josh    is also obstruction of the right ureter resulting in mild    hydroureteronephrosis. Gearline Josh is a fluid-filled thick-walled structure    extending from the cecum towards this collection raising suspicion for    perforated appendicitis with associated phlegmon/abscess.  Less likely    considerations include hematoma or malignancy either metastatic disease or    lymphoma. 2. Status post prostatectomy.  Otherwise no obvious urinary tract filling    defect or calculus given limitations due to motion artifact. Results were called by Dr. Minesh Guzman. Tere Kuo MD to [facility] on    8/18/2020 at 14:15.       Yesterday's IR:  Impression    1.  Technically successful placement of an 8 [place] drain in the right lower    quadrant collection.         2.  Aspiration of purulent material supports the diagnosis of ruptured    appendicitis with abscess\phlegmon, though continued attention on follow-up    is recommended.         RECOMMENDATIONS:    1.  Drain to bulb drainage.         2.  Flush right lower quadrant drain with 2-5 cc normal saline b.i.d.         3.  Record daily drain output. Diagnosis:      Patient Active Problem List   Diagnosis    Weight loss, unintentional    History of colon polyps    Pelvic mass        Assessment & plan:  Xiomara Villela is a very pleasant 79 y.o. male presenting with a pelvic mass, ? Perforated appendicitis? .     Percutaneous biopsy was done yesterday, and sent to the lab and pathology for cytology I asked IR to. . I'll manage according to its result. Due to his EXTREME Fragility. . ONLY if neoplastic would require surgery, but if it is ONLY perforated appendicitis, will hopefully manage non operatively. .      ___________________________________________    Nat Cody MD, FACS, FICS  8/21/2020  9:45 AM

## 2020-08-21 NOTE — PROGRESS NOTES
Nephrology Progress Note  8/21/2020 9:18 AM  Subjective:   Admit Date: 8/14/2020  PCP: Alan Harvey MD     Interval History: pelvic drain placed yesterday  -patient is voicing no post-procedure problems or issues. Diet: Dietary Nutrition Supplements: Renal Oral Supplement  Diet NPO, After Midnight    Data:   Scheduled Meds:   meropenem  1 g Intravenous Q12H    sodium chloride flush  5 mL Intracatheter BID    lidocaine  1 patch Transdermal Daily    amantadine  100 mg Oral BID    aspirin  81 mg Oral Daily    escitalopram  10 mg Oral Nightly    rivastigmine  1.5 mg Oral BID    rosuvastatin  40 mg Oral QAM    bicalutamide  50 mg Oral Daily    sodium chloride flush  10 mL Intravenous 2 times per day    enoxaparin  30 mg Subcutaneous Daily    carbidopa-levodopa  2 tablet Oral 4x Daily    And    entacapone  200 mg Oral 4x Daily     Continuous Infusions:   sodium chloride 100 mL/hr at 08/21/20 0012     PRN Meds:potassium chloride, sodium chloride flush, acetaminophen **OR** acetaminophen, polyethylene glycol, promethazine **OR** ondansetron, morphine  I/O last 3 completed shifts: In: 1400 [I.V.:1300; IV Piggyback:100]  Out: 1360 [Urine:1350; Drains:10]  No intake/output data recorded. Intake/Output Summary (Last 24 hours) at 8/21/2020 0918  Last data filed at 8/21/2020 0541  Gross per 24 hour   Intake 1400 ml   Output 1360 ml   Net 40 ml     CBC:   Recent Labs     08/19/20  0508 08/20/20  0531 08/21/20  0617   WBC 13.1* 9.8 8.5   HGB 10.7* 9.7* 9.9*    259 283     BMP:    Recent Labs     08/19/20  0508 08/20/20  0531 08/21/20  0617    146* 144   K 3.6 3.7 3.6    114* 110   CO2 21 21 22   BUN 19 21 20   CREATININE 1.3 1.4* 1.2   GLUCOSE 94 83 86     Hepatic:   No results for input(s): AST, ALT, ALB, BILITOT, ALKPHOS in the last 72 hours. Troponin: No results for input(s): TROPONINI in the last 72 hours. BNP: No results for input(s): BNP in the last 72 hours.   Lipids: No results for input(s): CHOL, HDL in the last 72 hours. Invalid input(s): LDLCALCU  ABGs: No results found for: PHART, PO2ART, BKJ6VYH  INR:   Recent Labs     08/19/20  0508 08/20/20  0531 08/21/20  0617   INR 1.21 1.14 1.14     Renal Labs  Albumin:    Lab Results   Component Value Date    LABALBU 3.1 08/14/2020     Calcium:    Lab Results   Component Value Date    CALCIUM 8.7 08/21/2020     Phosphorus:  No results found for: PHOS  U/A:    Lab Results   Component Value Date    NITRU NEGATIVE 08/15/2020    COLORU YELLOW 08/15/2020    WBCUA 1 08/15/2020    RBCUA 6 08/15/2020    MUCUS RARE 08/15/2020    TRICHOMONAS NONE SEEN 08/15/2020    BACTERIA NEGATIVE 08/15/2020    CLARITYU CLEAR 08/15/2020    SPECGRAV 1.013 08/15/2020    UROBILINOGEN NORMAL 08/15/2020    BILIRUBINUR NEGATIVE 08/15/2020    BLOODU SMALL 08/15/2020    KETUA MODERATE 08/15/2020     ABG:  No results found for: PHART, NYO1HUY, PO2ART, ZTF4HXL, BEART, THGBART, CEG7XUF, C3DCSFUZ  HgBA1c:  No results found for: LABA1C  Microalbumen/Creatinine ratio:  No components found for: RUCREAT    Objective:   Vitals: BP (!) 181/102   Pulse 86   Temp 97.1 °F (36.2 °C) (Axillary)   Resp 23   Ht 6' 1\" (1.854 m)   Wt 156 lb (70.8 kg)   SpO2 91%   BMI 20.58 kg/m²      General appearance:  awake and verbally interactive  HEENT: Head: normocephalic, atraumatic. Neck: supple, symmetrical, trachea midline  Cardiovascular: normal S1 and S2  Pulmonary: diminished lung sounds bilaterally   Abdomen:  soft / non-tender   Extremities: Trace edema to bilateral lower legs   -drain in place to right lower later pelvic region     Patient Active Problem List:     Weight loss, unintentional     History of colon polyps     Pelvic mass    Assessment and Plan:    IMP:  1 arf from atn/pra/hydro  2 right side mass possible malignancy  3 hx prostate ca  4 hypernatremia   5 protein malnutrition with constipation  6 hx scleroderma  7.  Intervals of elevated blood pressure     Plan   1.    -uop: 1.35 liters in the last 24 hours via jones   -latest serum creatinine 1.2 with normal Na / K / CO2  2.   -right pelvic drain placement on 8/20   3.   -follow-up with Urology   4   -latest serum sodium:144; following trend   5.   -poor appetite: supplementation via oral protein BID   6   -SCL-70 antibody testing was negative   7. -BP trend: systolic BP's have recently been 133 - 181  -PRN Clonidine ordered for systolic BP greater than 272    Post-hospitalization planning: per CM note from 8/20:   Referral submitted to [facility]. Electronically signed by SURENDRA Viera - CNP                  Nephrology Attending Progress Note  8/21/2020 11:59 AM  Subjective:   Admit Date: 8/14/2020  PCP: Shadia Chaidez MD    Interval History: I have personally performed face to face diagnostic evaluation on this patient. I have personally reviewed pertinent labs and imaging and agree with the care plan above.  My additional findings are as follows:  Pt was arousable today and weak    Objective:   Vitals: BP (!) 181/102   Pulse 86   Temp 97.1 °F (36.2 °C) (Axillary)   Resp 23   Ht 6' 1\" (1.854 m)   Wt 156 lb (70.8 kg)   SpO2 91%   BMI 20.58 kg/m²   Soft nt  arouable no edema    Assessment and Plan:  IMP:  As stated above    Plan     1 sp drain pus and on abx  2 bp increase and adjust meds and also agitated  3 stable uop and creat 1.2 monitor as improve  4 urology monitor  5 na stable  6 work up intake   Will follow           Electronically signed by Debbie Hassan MD on 8/21/2020 at 11:59 AM

## 2020-08-21 NOTE — PROGRESS NOTES
[facility] [address]   Progress Note  [address] 0 1 2      Date: 2020   Patient: John Carson   : 1940   DOA: 2020   MRN: [id number]   ROOM#: [id number]     Admit Date: 2020     Collaborating Urologist on Call at time of admission: Dr. Denise Atkinson        CC: Abdominal pain  Reason for Consult: Pelvic mass, rt hydronephrosis    Subjective:     Pain: none, no nausea and no vomiting,   Bowel Movement/Flatus:   Yes  Voiding: Renee catheter in place draining clear yellow urine     Pt resting comfortably in bed, denies any pain.     Objective:    Vitals:    BP (!) 181/102   Pulse 86   Temp 97.1 °F (36.2 °C) (Axillary)   Resp 23   Ht 6' 1\" (1.854 m)   Wt 156 lb (70.8 kg)   SpO2 92%   BMI 20.58 kg/m²    Temp  Av.1 °F (36.7 °C)  Min: 97.1 °F (36.2 °C)  Max: 98.7 °F (37.1 °C)       Intake/Output Summary (Last 24 hours) at 2020 1343  Last data filed at 2020 0541  Gross per 24 hour   Intake 1400 ml   Output 860 ml   Net 540 ml       Physical Exam:   General appearance: alert, appears stated age, cooperative, fatigued and no distress  Head: Normocephalic, without obvious abnormality, atraumatic  Back: No CVA tenderness  Abdomen: Soft, non-distended, mild TTP in RLQ  : Renee catheter in place draining clear yellow urine    Labs:   WBC:    Lab Results   Component Value Date    WBC 8.5 2020      Hemoglobin/Hematocrit:    Lab Results   Component Value Date    HGB 9.9 2020    HCT 31.4 2020      BMP:   Lab Results   Component Value Date     2020    K 3.6 2020     2020    CO2 22 2020    BUN 20 2020    LABALBU 3.1 2020    CREATININE 1.2 2020    CALCIUM 8.7 2020    GFRAA >60 2020    LABGLOM 58 2020      PT/INR:    Lab Results   Component Value Date    PROTIME 13.8 2020    INR 1.14 2020      PTT:    Lab Results   Component Value Date    APTT 33.0 2020       Imaging:  Ct Abdomen Pelvis Wo Contrast Additional Contrast? None    Result Date: 8/16/2020  EXAMINATION: CT OF THE ABDOMEN AND PELVIS WITHOUT CONTRAST 8/14/2020 9:52 pm TECHNIQUE: CT of the abdomen and pelvis was performed without the administration of intravenous contrast. Multiplanar reformatted images are provided for review. Dose modulation, iterative reconstruction, and/or weight based adjustment of the mA/kV was utilized to reduce the radiation dose to as low as reasonably achievable. COMPARISON: None. HISTORY: ORDERING SYSTEM PROVIDED HISTORY: nausea, vomiting, abdominal pain TECHNOLOGIST PROVIDED HISTORY: Reason for exam:->nausea, vomiting, abdominal pain Additional Contrast?->None Reason for Exam: nausea, vomiting, abdominal pain Acuity: Acute Type of Exam: Initial FINDINGS: Lower Chest: There is dependent consolidation in the lungs. Organs: There is mild right hydronephrosis. There is no obstructing stone. No acute abnormality of the liver, spleen, pancreas, adrenals, gallbladder, or left kidney. GI/Bowel: Bowel caliber is normal.  There is no evidence of active bowel inflammation. There is no evidence of acute appendicitis. Pelvis: In the right pelvic sidewall is a 56 x 67 x 66 mm (approximately) ill-defined mass adjacent to the iliac vessels. Borders of the mass are ill-defined. It medially displaces the right ureter. The mass appears somewhat elongated along the course of the iliac vein. The mass has an average density of 35 Hounsfield units. The urinary bladder is unremarkable. There are clips from prostatectomy. Peritoneum/Retroperitoneum: See above. No free air or free fluid. Bones/Soft Tissues: No acute osseous abnormality. Right pelvic mass. Differential diagnosis includes subacute hematoma or infiltrative malignancy. Mild right hydronephrosis due to compression of the ureter by the mass. Bibasilar pulmonary consolidation or atelectasis.      Ct Head Wo Contrast    Result Date: 8/15/2020  EXAMINATION: CT OF THE HEAD WITHOUT CONTRAST 8/15/2020 6:09 pm TECHNIQUE: CT of the head was performed without the administration of intravenous contrast. Dose modulation, iterative reconstruction, and/or weight based adjustment of the mA/kV was utilized to reduce the radiation dose to as low as reasonably achievable. COMPARISON: Brain MRI 04/09/2018 HISTORY: ORDERING SYSTEM PROVIDED HISTORY: lethargic TECHNOLOGIST PROVIDED HISTORY: Reason for exam:->lethargic Has a \"code stroke\" or \"stroke alert\" been called? ->No Reason for Exam: lethargic Acuity: Acute Type of Exam: Initial FINDINGS: Patient motion in some areas, partially limiting evaluation of those areas. BRAIN/VENTRICLES:  No masses nor acute intracranial hemorrhage. Intact gray/white matter differentiation without findings of acute ischemia. No mass effect nor midline shift. Patent basilar cisterns and foramen magnum. No hydrocephalus. Age-appropriate mild to moderate diffuse atrophy. Mild to moderate subcortical, deep, and periventricular white matter hypodensities likely due to chronic small vessel ischemia with additional pontine involvement. ORBITS:  Bilateral lens implants. Otherwise normal without acute abnormality. SINUSES:  Normally pneumatized and aerated. SOFT TISSUES/SKULL:  No acute soft tissue abnormality. Moderate atherosclerotic calcifications. No acute fracture. 1. No acute intracranial abnormality. 2. Chronic changes as above. Xr Chest Portable    Result Date: 8/21/2020  EXAMINATION: ONE XRAY VIEW OF THE CHEST 8/21/2020 12:02 pm COMPARISON: 08/16/2020 HISTORY: ORDERING SYSTEM PROVIDED HISTORY: SOb, requiring O2 support TECHNOLOGIST PROVIDED HISTORY: Reason for exam:->SOb, requiring O2 support Reason for Exam: SOb, requiring O2 support FINDINGS: The heart and mediastinal structures are stable. Worsening bilateral airspace disease is noted consistent with pneumonia. No significant pleural effusion is seen. Worsening bilateral airspace disease. Xr Chest 1 View    Result Date: 8/16/2020  EXAMINATION: ONE XRAY VIEW OF THE CHEST 8/16/2020 10:07 am COMPARISON: CT abdomen/pelvis 08/14/2020. HISTORY: ORDERING SYSTEM PROVIDED HISTORY: hypoxia and fever TECHNOLOGIST PROVIDED HISTORY: Reason for exam:->hypoxia and fever Acuity: Acute Type of Exam: Subsequent/Follow-up FINDINGS: Single view provided. Moderate rotation. Mediastinal and cardiac silhouettes are normal.  The lung volumes are normal.  There are bilateral symmetric basilar predominant ground-glass densities with mild interstitial opacities. No lobar consolidation. No large effusion or pneumothorax. No free subdiaphragmatic air. Bilateral symmetric interstitial opacities more prominent lung bases. This could represent possible edema versus atypical pneumonia. Ct Urogram    Result Date: 8/20/2020  EXAMINATION: CT UROGRAM 8/18/2020 9:39 am TECHNIQUE: CT of the abdomen and pelvis was performed before and after the administration of intravenous contrast as per CT urogram protocol. Multiplanar reformatted images as well as MIP urogram images are provided for review. Dose modulation, iterative reconstruction, and/or weight based adjustment of the mA/kV was utilized to reduce the radiation dose to as low as reasonably achievable. COMPARISON: 08/14/2020 HISTORY: ORDERING SYSTEM PROVIDED HISTORY: eval the abnormality noted on CT - and please do a 5 minutes delay urogram TECHNOLOGIST PROVIDED HISTORY: Reason for exam:->eval the abnormality noted on CT - and please do a 5 minutes delay urogram Reason for exam:->Please do a 5 min delkay urogram Reason for Exam: abd pain;urinary retention Acuity: Unknown Type of Exam: Unknown Additional signs and symptoms: pt moved too much during CT scan,unable to follow directions;AMS;eval abnormality noted on CT FINDINGS: Overall exam is moderately limited due to motion artifact.  Lower Chest: New trace bilateral pleural effusions with worsening bibasilar ground-glass and consolidative opacities. Kidneys and Urinary Tract: No urinary tract calculus. There is delayed excretion of contrast material into the right renal collecting system. There is mild right hydroureteronephrosis extending to the level of the pelvis. The kidneys otherwise enhance symmetrically. Probable bilateral renal cysts. No other definite urinary tract filling defect. The left ureter is not well opacified on the postcontrast images. Renee catheter within a decompressed bladder. Organs: The liver, spleen, pancreas, gallbladder and adrenal glands are grossly unremarkable. No biliary duct dilation. GI/Bowel: No dilated loops of bowel or bowel wall thickening. No definite free air. There is a tubular thick-walled fluid-filled structure which extends from the cecum (best seen on series 604 image 70) felt to be the appendix. The tip of the appendix appears to communicate with a complex 5.5 x 4.7 cm collection which demonstrates mild peripheral enhancement. This abuts and likely extends into the right iliopsoas muscle and likely abuts and or encases the right external iliac vessels. This collection also obstructs the right ureter. Pelvis: Status post prostatectomy. No pathologic enlarged adenopathy is otherwise identified. No other significant free fluid. Peritoneum/Retroperitoneum: The aorta is normal in caliber with moderate to severe atherosclerosis. No retroperitoneal or mesenteric adenopathy. No definite ascites or drainable fluid collection. Bones/Soft Tissues: No acute findings in the bones or soft tissues. Diffuse osteopenia. 1. There is a complex 5.5 x 4.7 cm collection in the right pelvis with surrounding mild peripheral enhancement extends into the right iliopsoas muscle and likely abuts and encases the right external iliac vessels. There is also obstruction of the right ureter resulting in mild hydroureteronephrosis.   There is a the expected area of the collection. Next, the tract was sequentially dilated to accommodate an 8 [place] drain. A total of approximately 15 cc of thick bloody/purulent was aspirated and sent for cytology, gram stain and culture. The drain was secured to the skin with suture. Drain was attached to a suction bulb. The patient tolerated the procedure well and there were no immediate complications. EBL: Less than 5 cc FINDINGS: 1.  8 Vietnamese drain seen within the right lower quadrant, which yielded purulent material. 2.  No free air identified within the visualized abdomen. 3.  Previously noted thickening of the appendix and the size of the collection are not significantly changed, though evaluation is somewhat limited without IV contrast.     1.  Technically successful placement of an 8 Vietnamese drain in the right lower quadrant collection. 2.  Aspiration of purulent material supports the diagnosis of ruptured appendicitis with abscess\phlegmon, though continued attention on follow-up is recommended. RECOMMENDATIONS: 1. Drain to bulb drainage. 2.  Flush right lower quadrant drain with 2-5 cc normal saline b.i.d. 3.  Record daily drain output. Assessment & Plan:      Anh Quinones is a 79y.o. year old male admitted 8/14/2020 for pelvic mass. H/o prostate cancer s/p RALP 1/2010 at [facility]. Pathology report = [place] 3+4 with negative margins. Pt on Eligard. PSA: 2 3/19, 2.9 6/19     1) Pelvic Mass              6/64/55 CT a/p: Right pelvic mass measuring 56 x 67 x 66 mm, ill-defined mass adjacent to the iliac vessels that medially displaces the right ureter. The mass has an average density of 35 Hounsfield units. Differential diagnosis includes subacute hematoma or infiltrative malignancy.  Mild right hydronephrosis due to compression of the ureter by the mass.              8/20/20 s/p percutaneous bx performed by IR   Pathology results pending  2) Mild Right Hydronephrosis              Likely ongoing

## 2020-08-21 NOTE — PROGRESS NOTES
Comprehensive Nutrition Assessment    Type and Reason for Visit:  Initial    Nutrition Recommendations/Plan:   Continue current diet order and ONS  Encourage po intake as able  Will closely monitor nutrition status, poc    Nutrition Assessment:  Pt admitted for pelvic mass, pt unavailable at time of visit attempt, poor po intake per documentation, on ONS, pt is high nutrition risk at this time    Malnutrition Assessment:  Malnutrition Status:     Insufficient data    Estimated Daily Nutrient Needs:  Energy (kcal):  9784-6282; Weight Used for Energy Requirements:  Admission     Protein (g):  ; Weight Used for Protein Requirements:  Current         Wounds:  None       Current Nutrition Therapies:    Dietary Nutrition Supplements: Renal Oral Supplement  DIET GENERAL; Dysphagia Minced and Moist  Dietary Nutrition Supplements: Standard Oral Supplement    Anthropometric Measures:  · Height: 6' 0.99\" (185.4 cm)  · Current Body Weight: 156 lb 1.4 oz (70.8 kg)    · Ideal Body Weight: 184 lbs; % Ideal Body Weight 84.8 %   · BMI: 20.6  · Adjusted Body Weight:  ; No Adjustment     · BMI Categories: Underweight (BMI less than 22) age over 72       Nutrition Diagnosis:   · Inadequate oral intake related to ANGELITA as evidenced by intake 0-25%  Nutrition Interventions:   Food and/or Nutrient Delivery:  Continue Current Diet, Continue Oral Nutrition Supplement  Nutrition Education/Counseling:  No recommendation at this time   Coordination of Nutrition Care:  Continued Inpatient Monitoring    Goals:  pt will consume greater than 50% of meals and supplements       Nutrition Monitoring and Evaluation:   Food/Nutrient Intake Outcomes:  Food and Nutrient Intake, Supplement Intake  Physical Signs/Symptoms Outcomes:  Chewing or Swallowing, Weight, Biochemical Data     Discharge Planning:     Too soon to determine     Electronically signed by Pamella Smith MS, RD, LD on 8/21/20 at 4:09 PM EDT    Contact: [phone]

## 2020-08-21 NOTE — PROGRESS NOTES
Hospitalist Progress Note      Name:  Xiomara Villela /Age/Sex: 1940  (79 y.o. male)   MRN & CSN:  [id number] & [id number] Admission Date/Time: 2020  7:25 PM   Location:  [address] [place] [address] PCP: Alan Harvey MD         Hospital Day: 8    Assessment and Plan:   Xiomara Villela is a 79 y.o.  male  who presents with Pelvic mass    1. Pelvic Mass  · CT A/P with right pelvic mass with right hydronephrosis  · CT urography with 5.5 x 4.7 cm collection in the right pelvis with midl peripheral enhancement extending into the right iliopsoas muscle as well as obstruction of the right ureter with hydroureteronephrosis. Also fluid filled thick walled structure extending from the cecum towards this collection raising suspicion for perforated appendicitis with associated abscess. · Afebrile, WBC wnl  · S/P IR with 15 cc of thick bloody/purulent. · Currently on Meropenem, surgical culture with gram positive and gram negative cocci. · Pathology pending  · Urology and Surgery following     2. Acute Respiratory Failure with Hypoxia: due to PNA  · On O2 via NC at 2 LPM, short of breath this morning. · CXR with worsening bilateral airspace disease  · Respiratory studies including COVID 19 negative. · Continue antibiotics for now. · Give Lasix x 1, check pro BNP. 3. Acute kidney injury: creatinine 2.0 on admission, now normal. IVF DC because of fluid overload. 4. Moderate Protein Calorie Malnutrition    5. Prostate Cancer: S/p Prostatectomy    6. Dementia with Parkinson's Disease    D/W Mark.     Diet Dietary Nutrition Supplements: Renal Oral Supplement  DIET GENERAL; Dysphagia Minced and Moist  Dietary Nutrition Supplements: Standard Oral Supplement   DVT Prophylaxis [x] Lovenox, []  Heparin, [] SCDs, [] Warfarin  [] NOAC     GI Prophylaxis [] PPI,  [] H2 Blocker,  [] Carafate,  [x] Diet/Tube Feeds   Code Status Full Code   MDM [] Low, [x] Moderate,[]  High     History of Present Illness:     Chief Complaint: Pelvic mass    Seen and examined. No abdominal pain. No chest pain or shortness of breath. Ten point ROS reviewed negative, unless as noted above    Objective: Intake/Output Summary (Last 24 hours) at 8/21/2020 1437  Last data filed at 8/21/2020 0541  Gross per 24 hour   Intake 1400 ml   Output 860 ml   Net 540 ml      Vitals:   Vitals:    08/21/20 1214   BP:    Pulse:    Resp:    Temp:    SpO2: 92%     Physical Exam:   GEN Awake male, sitting upright in bed in no apparent distress. Appears given age. EYES Pupils are equally round. No scleral erythema, discharge, or conjunctivitis. HENT Mucous membranes are moist. Oral pharynx without exudates, no evidence of thrush. NECK Supple, no apparent thyromegaly or masses. RESP Clear to auscultation, no wheezes, rales or rhonchi. Symmetric chest movement while on room air. CARDIO/VASC S1/S2 auscultated. Regular rate without appreciable murmurs, rubs, or gallops. No JVD or carotid bruits. Peripheral pulses equal bilaterally and palpable. No peripheral edema. GI Abdomen is soft without significant tenderness, masses, or guarding. Bowel sounds are normoactive. Rectal exam deferred. RLQ drain with 10 ml in the last 24 hours. MSK No gross joint deformities. SKIN Normal coloration, warm, dry. NEURO Cranial nerves appear grossly intact, normal speech, no lateralizing weakness.   PSYCH Awake, alert, oriented x 2    Medications:   Medications:    amLODIPine  5 mg Oral Daily    meropenem  1 g Intravenous Q12H    sodium chloride flush  5 mL Intracatheter BID    lidocaine  1 patch Transdermal Daily    amantadine  100 mg Oral BID    aspirin  81 mg Oral Daily    escitalopram  10 mg Oral Nightly    rivastigmine  1.5 mg Oral BID    rosuvastatin  40 mg Oral QAM    bicalutamide  50 mg Oral Daily    sodium chloride flush  10 mL Intravenous 2 times per day    enoxaparin  30 mg Subcutaneous Daily    carbidopa-levodopa  2 tablet Oral 4x Daily    And    entacapone  200 mg Oral 4x Daily      Infusions:   PRN Meds: cloNIDine, 0.1 mg, TID PRN  potassium chloride, 10 mEq, PRN  sodium chloride flush, 10 mL, PRN  acetaminophen, 650 mg, Q6H PRN    Or  acetaminophen, 650 mg, Q6H PRN  polyethylene glycol, 17 g, Daily PRN  promethazine, 12.5 mg, Q6H PRN    Or  ondansetron, 4 mg, Q6H PRN  morphine, 2 mg, Q4H PRN        Recent Labs     08/19/20  0508 08/20/20  0531 08/21/20  0617   WBC 13.1* 9.8 8.5   HGB 10.7* 9.7* 9.9*   HCT 34.0* 30.6* 31.4*    259 283      Recent Labs     08/19/20  0508 08/20/20  0531 08/21/20  0617    146* 144   K 3.6 3.7 3.6    114* 110   CO2 21 21 22   BUN 19 21 20   CREATININE 1.3 1.4* 1.2     Recent Labs     08/19/20  0508 08/20/20  0531 08/21/20  0617   INR 1.21 1.14 1.14     Imaging reviewed      Electronically signed by Damaris Contreras MD on 8/21/2020 at 2:37 PM

## 2020-08-22 LAB
ANION GAP SERPL CALCULATED.3IONS-SCNC: 8 MMOL/L (ref 4–16)
APTT: 49.1 SECONDS (ref 25.1–37.1)
BASOPHILS ABSOLUTE: 0 K/CU MM
BASOPHILS RELATIVE PERCENT: 0.4 % (ref 0–1)
BUN BLDV-MCNC: 22 MG/DL (ref 6–23)
CALCIUM SERPL-MCNC: 9.2 MG/DL (ref 8.3–10.6)
CHLORIDE BLD-SCNC: 104 MMOL/L (ref 99–110)
CO2: 29 MMOL/L (ref 21–32)
CREAT SERPL-MCNC: 1.1 MG/DL (ref 0.9–1.3)
D DIMER: 1839 NG/ML(DDU)
DIFFERENTIAL TYPE: ABNORMAL
EOSINOPHILS ABSOLUTE: 0.1 K/CU MM
EOSINOPHILS RELATIVE PERCENT: 1.8 % (ref 0–3)
FIBRINOGEN LEVEL: 570 MG/DL (ref 196.9–442.1)
GFR AFRICAN AMERICAN: >60 ML/MIN/1.73M2
GFR NON-AFRICAN AMERICAN: >60 ML/MIN/1.73M2
GLUCOSE BLD-MCNC: 97 MG/DL (ref 70–99)
HCT VFR BLD CALC: 33.7 % (ref 42–52)
HEMOGLOBIN: 10.7 GM/DL (ref 13.5–18)
IMMATURE NEUTROPHIL %: 1.1 % (ref 0–0.43)
INR BLD: 1.11 INDEX
LYMPHOCYTES ABSOLUTE: 1.3 K/CU MM
LYMPHOCYTES RELATIVE PERCENT: 17.8 % (ref 24–44)
MAGNESIUM: 1.9 MG/DL (ref 1.8–2.4)
MCH RBC QN AUTO: 30.1 PG (ref 27–31)
MCHC RBC AUTO-ENTMCNC: 31.8 % (ref 32–36)
MCV RBC AUTO: 94.7 FL (ref 78–100)
MONOCYTES ABSOLUTE: 0.5 K/CU MM
MONOCYTES RELATIVE PERCENT: 6.3 % (ref 0–4)
NUCLEATED RBC %: 0 %
PDW BLD-RTO: 14.4 % (ref 11.7–14.9)
PLATELET # BLD: 327 K/CU MM (ref 140–440)
PMV BLD AUTO: 9.3 FL (ref 7.5–11.1)
POTASSIUM SERPL-SCNC: 3.5 MMOL/L (ref 3.5–5.1)
PROTHROMBIN TIME: 13.5 SECONDS (ref 11.7–14.5)
RBC # BLD: 3.56 M/CU MM (ref 4.6–6.2)
SEGMENTED NEUTROPHILS ABSOLUTE COUNT: 5.2 K/CU MM
SEGMENTED NEUTROPHILS RELATIVE PERCENT: 72.6 % (ref 36–66)
SODIUM BLD-SCNC: 141 MMOL/L (ref 135–145)
TOTAL IMMATURE NEUTOROPHIL: 0.08 K/CU MM
TOTAL NUCLEATED RBC: 0 K/CU MM
WBC # BLD: 7.2 K/CU MM (ref 4–10.5)

## 2020-08-22 PROCEDURE — 85730 THROMBOPLASTIN TIME PARTIAL: CPT

## 2020-08-22 PROCEDURE — 99232 SBSQ HOSP IP/OBS MODERATE 35: CPT | Performed by: SURGERY

## 2020-08-22 PROCEDURE — 36415 COLL VENOUS BLD VENIPUNCTURE: CPT

## 2020-08-22 PROCEDURE — 6360000002 HC RX W HCPCS: Performed by: INTERNAL MEDICINE

## 2020-08-22 PROCEDURE — 80048 BASIC METABOLIC PNL TOTAL CA: CPT

## 2020-08-22 PROCEDURE — 94761 N-INVAS EAR/PLS OXIMETRY MLT: CPT

## 2020-08-22 PROCEDURE — 85610 PROTHROMBIN TIME: CPT

## 2020-08-22 PROCEDURE — 85379 FIBRIN DEGRADATION QUANT: CPT

## 2020-08-22 PROCEDURE — 1200000000 HC SEMI PRIVATE

## 2020-08-22 PROCEDURE — 85384 FIBRINOGEN ACTIVITY: CPT

## 2020-08-22 PROCEDURE — 2580000003 HC RX 258: Performed by: INTERNAL MEDICINE

## 2020-08-22 PROCEDURE — 83735 ASSAY OF MAGNESIUM: CPT

## 2020-08-22 PROCEDURE — 2700000000 HC OXYGEN THERAPY PER DAY

## 2020-08-22 PROCEDURE — 2580000003 HC RX 258: Performed by: RADIOLOGY

## 2020-08-22 PROCEDURE — 6370000000 HC RX 637 (ALT 250 FOR IP): Performed by: INTERNAL MEDICINE

## 2020-08-22 PROCEDURE — 85025 COMPLETE CBC W/AUTO DIFF WBC: CPT

## 2020-08-22 PROCEDURE — 6360000002 HC RX W HCPCS: Performed by: NURSE PRACTITIONER

## 2020-08-22 RX ORDER — FUROSEMIDE 10 MG/ML
40 INJECTION INTRAMUSCULAR; INTRAVENOUS ONCE
Status: COMPLETED | OUTPATIENT
Start: 2020-08-22 | End: 2020-08-22

## 2020-08-22 RX ADMIN — ENTACAPONE 200 MG: 200 TABLET, FILM COATED ORAL at 09:59

## 2020-08-22 RX ADMIN — FUROSEMIDE 40 MG: 10 INJECTION, SOLUTION INTRAMUSCULAR; INTRAVENOUS at 18:32

## 2020-08-22 RX ADMIN — AMANTADINE HYDROCHLORIDE 100 MG: 100 CAPSULE, LIQUID FILLED ORAL at 10:06

## 2020-08-22 RX ADMIN — BICALUTAMIDE 50 MG: 50 TABLET ORAL at 09:59

## 2020-08-22 RX ADMIN — ASPIRIN 81 MG CHEWABLE TABLET 81 MG: 81 TABLET CHEWABLE at 09:58

## 2020-08-22 RX ADMIN — MORPHINE SULFATE 2 MG: 2 INJECTION, SOLUTION INTRAMUSCULAR; INTRAVENOUS at 04:37

## 2020-08-22 RX ADMIN — CARBIDOPA AND LEVODOPA 2 TABLET: 25; 100 TABLET ORAL at 17:25

## 2020-08-22 RX ADMIN — ENTACAPONE 200 MG: 200 TABLET, FILM COATED ORAL at 21:18

## 2020-08-22 RX ADMIN — RIVASTIGMINE TARTRATE 1.5 MG: 1.5 CAPSULE ORAL at 21:18

## 2020-08-22 RX ADMIN — ENOXAPARIN SODIUM 30 MG: 30 INJECTION SUBCUTANEOUS at 10:00

## 2020-08-22 RX ADMIN — RIVASTIGMINE TARTRATE 1.5 MG: 1.5 CAPSULE ORAL at 09:59

## 2020-08-22 RX ADMIN — SODIUM CHLORIDE, PRESERVATIVE FREE 10 ML: 5 INJECTION INTRAVENOUS at 21:19

## 2020-08-22 RX ADMIN — ESCITALOPRAM OXALATE 10 MG: 10 TABLET ORAL at 21:18

## 2020-08-22 RX ADMIN — ROSUVASTATIN 40 MG: 40 TABLET, FILM COATED ORAL at 09:58

## 2020-08-22 RX ADMIN — CARBIDOPA AND LEVODOPA 2 TABLET: 25; 100 TABLET ORAL at 13:55

## 2020-08-22 RX ADMIN — AMLODIPINE BESYLATE 5 MG: 5 TABLET ORAL at 10:02

## 2020-08-22 RX ADMIN — ENTACAPONE 200 MG: 200 TABLET, FILM COATED ORAL at 17:25

## 2020-08-22 RX ADMIN — CARBIDOPA AND LEVODOPA 2 TABLET: 25; 100 TABLET ORAL at 09:59

## 2020-08-22 RX ADMIN — ENTACAPONE 200 MG: 200 TABLET, FILM COATED ORAL at 13:55

## 2020-08-22 RX ADMIN — AMANTADINE HYDROCHLORIDE 100 MG: 100 CAPSULE, LIQUID FILLED ORAL at 21:23

## 2020-08-22 RX ADMIN — MEROPENEM 1 G: 1 INJECTION, POWDER, FOR SOLUTION INTRAVENOUS at 10:06

## 2020-08-22 RX ADMIN — SODIUM CHLORIDE, PRESERVATIVE FREE 5 ML: 5 INJECTION INTRAVENOUS at 10:02

## 2020-08-22 RX ADMIN — CARBIDOPA AND LEVODOPA 2 TABLET: 25; 100 TABLET ORAL at 21:18

## 2020-08-22 RX ADMIN — SODIUM CHLORIDE, PRESERVATIVE FREE 10 ML: 5 INJECTION INTRAVENOUS at 10:00

## 2020-08-22 RX ADMIN — MEROPENEM 1 G: 1 INJECTION, POWDER, FOR SOLUTION INTRAVENOUS at 18:30

## 2020-08-22 ASSESSMENT — PAIN SCALES - WONG BAKER
WONGBAKER_NUMERICALRESPONSE: 0

## 2020-08-22 ASSESSMENT — PAIN SCALES - GENERAL
PAINLEVEL_OUTOF10: 5
PAINLEVEL_OUTOF10: 0
PAINLEVEL_OUTOF10: 0

## 2020-08-22 ASSESSMENT — PAIN DESCRIPTION - PAIN TYPE: TYPE: CHRONIC PAIN

## 2020-08-22 NOTE — PROGRESS NOTES
Nephrology Progress Note  8/22/2020 9:55 AM  Subjective:   Admit Date: 8/14/2020  PCP: Sukh Rosado MD  Interval History: pt starting to get more awake    Diet: Dietary Nutrition Supplements: Renal Oral Supplement  DIET GENERAL; Dysphagia Minced and Moist  Dietary Nutrition Supplements: Standard Oral Supplement  Pain is:None      Data:   Scheduled Meds:   amLODIPine  5 mg Oral Daily    meropenem  1 g Intravenous Q12H    sodium chloride flush  5 mL Intracatheter BID    lidocaine  1 patch Transdermal Daily    amantadine  100 mg Oral BID    aspirin  81 mg Oral Daily    escitalopram  10 mg Oral Nightly    rivastigmine  1.5 mg Oral BID    rosuvastatin  40 mg Oral QAM    bicalutamide  50 mg Oral Daily    sodium chloride flush  10 mL Intravenous 2 times per day    enoxaparin  30 mg Subcutaneous Daily    carbidopa-levodopa  2 tablet Oral 4x Daily    And    entacapone  200 mg Oral 4x Daily     Continuous Infusions:    PRN Meds:cloNIDine, potassium chloride, sodium chloride flush, acetaminophen **OR** acetaminophen, polyethylene glycol, promethazine **OR** ondansetron, morphine  I/O last 3 completed shifts: In: 800 [P.O.:800]  Out: 5960 [Urine:5950; Drains:10]  No intake/output data recorded. Intake/Output Summary (Last 24 hours) at 8/22/2020 0955  Last data filed at 8/22/2020 4441  Gross per 24 hour   Intake 800 ml   Output 5960 ml   Net -5160 ml     CBC:   Recent Labs     08/20/20  0531 08/21/20  0617 08/22/20  0531   WBC 9.8 8.5 7.2   HGB 9.7* 9.9* 10.7*    283 327     BMP:    Recent Labs     08/20/20  0531 08/21/20  0617 08/22/20  0531   * 144 141   K 3.7 3.6 3.5   * 110 104   CO2 21 22 29   BUN 21 20 22   CREATININE 1.4* 1.2 1.1   GLUCOSE 83 86 97     Hepatic:   No results for input(s): AST, ALT, ALB, BILITOT, ALKPHOS in the last 72 hours. Troponin: No results for input(s): TROPONINI in the last 72 hours. BNP: No results for input(s): BNP in the last 72 hours.   Lipids: No surgery follow on abx and affect better trying avoid surgery  3 fu urology  4 na stable  5 trying get more intake  rec to rehab           Andriy Grayson MD

## 2020-08-22 NOTE — PROGRESS NOTES
Hospitalist Progress Note      Name:  Niko Roberts /Age/Sex: 1940  (79 y.o. male)   MRN & CSN:  [id number] & [id number] Admission Date/Time: 2020  7:25 PM   Location:  [address] [place] [address] PCP: Xiomara Irving MD         Hospital Day: 9    History of Present Illness:     Chief Complaint: Pelvic mass  Niko Roberts is a 79 y.o.  male  who presents with Pelvic mass     The patient seen and examined at bed side. He denies having any pain or SOB. Ten point ROS reviewed negative, unless as noted above    Objective: Intake/Output Summary (Last 24 hours) at 2020 0829  Last data filed at 2020 0161  Gross per 24 hour   Intake 800 ml   Output 5960 ml   Net -5160 ml      Vitals:   Vitals:    20 0540   BP: 139/81   Pulse: 71   Resp: 16   Temp: 97.8 °F (36.6 °C)   SpO2: 96%     Physical Exam:   General Appearance: alert and awake in no apparent distress  Cardiovascular: normal rate, regular rhythm, normal S1 and S2, no murmurs  Pulmonary/Chest: clear to auscultation bilaterally  Abdomen: soft, non-tender, non-distended, normal bowel sounds, no masses.  RLQ drain tube seen  Extremities: no cyanosis, clubbing or edema, pulse   Skin: warm and dry, no rash or erythema  Head: normocephalic and atraumatic  Eyes: pupils equal, round, and reactive to light  Neck: supple and non-tender without mass, no thyromegaly   Musculoskeletal: normal range of motion, no joint swelling, deformity or tenderness  Neurological: alert, oriented X2,    Medications:   Medications:    amLODIPine  5 mg Oral Daily    meropenem  1 g Intravenous Q12H    sodium chloride flush  5 mL Intracatheter BID    lidocaine  1 patch Transdermal Daily    amantadine  100 mg Oral BID    aspirin  81 mg Oral Daily    escitalopram  10 mg Oral Nightly    rivastigmine  1.5 mg Oral BID    rosuvastatin  40 mg Oral QAM    bicalutamide  50 mg Oral Daily    sodium chloride flush  10 mL Intravenous 2 times per day    enoxaparin  30 mg Subcutaneous Daily    carbidopa-levodopa  2 tablet Oral 4x Daily    And    entacapone  200 mg Oral 4x Daily      Infusions:   PRN Meds: cloNIDine, 0.1 mg, TID PRN  potassium chloride, 10 mEq, PRN  sodium chloride flush, 10 mL, PRN  acetaminophen, 650 mg, Q6H PRN    Or  acetaminophen, 650 mg, Q6H PRN  polyethylene glycol, 17 g, Daily PRN  promethazine, 12.5 mg, Q6H PRN    Or  ondansetron, 4 mg, Q6H PRN  morphine, 2 mg, Q4H PRN            Pertinent New Labs & Imaging Studies     CBC with Differential:    Lab Results   Component Value Date    WBC 7.2 08/22/2020    RBC 3.56 08/22/2020    HGB 10.7 08/22/2020    HCT 33.7 08/22/2020     08/22/2020    MCV 94.7 08/22/2020    MCH 30.1 08/22/2020    MCHC 31.8 08/22/2020    RDW 14.4 08/22/2020    SEGSPCT 72.6 08/22/2020    LYMPHOPCT 17.8 08/22/2020    MONOPCT 6.3 08/22/2020    BASOPCT 0.4 08/22/2020    MONOSABS 0.5 08/22/2020    LYMPHSABS 1.3 08/22/2020    EOSABS 0.1 08/22/2020    BASOSABS 0.0 08/22/2020    DIFFTYPE AUTOMATED DIFFERENTIAL 08/22/2020     CMP:    Lab Results   Component Value Date     08/22/2020    K 3.5 08/22/2020     08/22/2020    CO2 29 08/22/2020    BUN 22 08/22/2020    CREATININE 1.1 08/22/2020    GFRAA >60 08/22/2020    LABGLOM >60 08/22/2020    GLUCOSE 97 08/22/2020    PROT 6.8 08/14/2020    LABALBU 3.1 08/14/2020    CALCIUM 9.2 08/22/2020    BILITOT 0.4 08/14/2020    ALKPHOS 98 08/14/2020    AST 11 08/14/2020    ALT <5 08/14/2020     Ct Abdomen Pelvis Wo Contrast Additional Contrast? None    Result Date: 8/16/2020  EXAMINATION: CT OF THE ABDOMEN AND PELVIS WITHOUT CONTRAST 8/14/2020 9:52 pm TECHNIQUE: CT of the abdomen and pelvis was performed without the administration of intravenous contrast. Multiplanar reformatted images are provided for review. Dose modulation, iterative reconstruction, and/or weight based adjustment of the mA/kV was utilized to reduce the radiation dose to as low as reasonably achievable. COMPARISON: None. HISTORY: ORDERING SYSTEM PROVIDED HISTORY: nausea, vomiting, abdominal pain TECHNOLOGIST PROVIDED HISTORY: Reason for exam:->nausea, vomiting, abdominal pain Additional Contrast?->None Reason for Exam: nausea, vomiting, abdominal pain Acuity: Acute Type of Exam: Initial FINDINGS: Lower Chest: There is dependent consolidation in the lungs. Organs: There is mild right hydronephrosis. There is no obstructing stone. No acute abnormality of the liver, spleen, pancreas, adrenals, gallbladder, or left kidney. GI/Bowel: Bowel caliber is normal.  There is no evidence of active bowel inflammation. There is no evidence of acute appendicitis. Pelvis: In the right pelvic sidewall is a 56 x 67 x 66 mm (approximately) ill-defined mass adjacent to the iliac vessels. Borders of the mass are ill-defined. It medially displaces the right ureter. The mass appears somewhat elongated along the course of the iliac vein. The mass has an average density of 35 Hounsfield units. The urinary bladder is unremarkable. There are clips from prostatectomy. Peritoneum/Retroperitoneum: See above. No free air or free fluid. Bones/Soft Tissues: No acute osseous abnormality. Right pelvic mass. Differential diagnosis includes subacute hematoma or infiltrative malignancy. Mild right hydronephrosis due to compression of the ureter by the mass. Bibasilar pulmonary consolidation or atelectasis. Ct Head Wo Contrast    Result Date: 8/15/2020  EXAMINATION: CT OF THE HEAD WITHOUT CONTRAST 8/15/2020 6:09 pm TECHNIQUE: CT of the head was performed without the administration of intravenous contrast. Dose modulation, iterative reconstruction, and/or weight based adjustment of the mA/kV was utilized to reduce the radiation dose to as low as reasonably achievable.  COMPARISON: Brain MRI 04/09/2018 HISTORY: ORDERING SYSTEM PROVIDED HISTORY: lethargic TECHNOLOGIST PROVIDED HISTORY: Reason for exam:->lethargic Has a \"code stroke\" or \"stroke alert\" been bilateral airspace dz  Pro BNP slightly elevated    SLIM - Improved  Likely from ATN with post obstructive uropathy  Hydronephrosis on kidney  Nephrology recommendations appreciated    Moderate PCM  Dietary following, appreciated recommendations    Prostate cancer  S/p Prostatectomy    Anemia of Chronic disorders    Dementia with Parkinson's Dz    Diet Dietary Nutrition Supplements: Renal Oral Supplement  DIET GENERAL; Dysphagia Minced and Moist  Dietary Nutrition Supplements: Standard Oral Supplement   DVT Prophylaxis [x] Lovenox, []  Heparin, [] SCDs, [] Ambulation   GI Prophylaxis [] PPI,  [] H2 Blocker,  [] Carafate,  [x] Diet/Tube Feeds   Code Status Full Code   Disposition Patient requires continued admission due to Pelvic abscess   MDM [] Low, [] Moderate,[]  High      Electronically signed by Erendira Vega MD on 8/22/2020 at 8:29 AM

## 2020-08-22 NOTE — PROGRESS NOTES
GENERAL SURGERY PROGRESS NOTE    Krystyna Pacheco is a 79 y.o. male s/p IR drain of pelvic fluid collection on 8/20. Subjective:  Doing ok this morning. Denies significant abdominal pain. Reports tolerating diet. Denies complaints. Objective:    Vitals: VITALS:  BP (!) 142/76   Pulse 69   Temp 98.2 °F (36.8 °C) (Oral)   Resp 15   Ht 6' 0.99\" (1.854 m)   Wt 156 lb (70.8 kg)   SpO2 96%   BMI 20.59 kg/m²     I/O: 08/21 0701 - 08/22 0700  In: 800 [P.O.:800]  Out: 5960 [Urine:5950; Drains:10]    Labs/Imaging Results:   Lab Results   Component Value Date     08/22/2020    K 3.5 08/22/2020     08/22/2020    CO2 29 08/22/2020    BUN 22 08/22/2020    CREATININE 1.1 08/22/2020    GLUCOSE 97 08/22/2020    CALCIUM 9.2 08/22/2020      Lab Results   Component Value Date    WBC 7.2 08/22/2020    HGB 10.7 (L) 08/22/2020    HCT 33.7 (L) 08/22/2020    MCV 94.7 08/22/2020     08/22/2020         Scheduled Meds: amLODIPine, 5 mg, Oral, Daily    meropenem, 1 g, Intravenous, Q12H    sodium chloride flush, 5 mL, Intracatheter, BID    lidocaine, 1 patch, Transdermal, Daily    amantadine, 100 mg, Oral, BID    aspirin, 81 mg, Oral, Daily    escitalopram, 10 mg, Oral, Nightly    rivastigmine, 1.5 mg, Oral, BID    rosuvastatin, 40 mg, Oral, QAM    bicalutamide, 50 mg, Oral, Daily    sodium chloride flush, 10 mL, Intravenous, 2 times per day    enoxaparin, 30 mg, Subcutaneous, Daily    carbidopa-levodopa, 2 tablet, Oral, 4x Daily **AND** entacapone, 200 mg, Oral, 4x Daily    Physical Exam:  General: A&O x 3, no distress. HEENT: Anicteric sclerae, MMM. Extremities: No edema bilat LE. Abdomen: Soft, nontender, nondistended. RLQ drain in place with dark bloody/purulent drainage--10cc output recorded    Assessment & plan:  Jeannie López [place] is a very pleasant 79 y. o. male presenting with a pelvic mass, ? Perforated appendicitis? .     Cytology pending from IR biopsy      - monitor drain output  -

## 2020-08-23 LAB
ANION GAP SERPL CALCULATED.3IONS-SCNC: 9 MMOL/L (ref 4–16)
APTT: 50.9 SECONDS (ref 25.1–37.1)
BASOPHILS ABSOLUTE: 0.1 K/CU MM
BASOPHILS RELATIVE PERCENT: 0.7 % (ref 0–1)
BUN BLDV-MCNC: 22 MG/DL (ref 6–23)
CALCIUM SERPL-MCNC: 8.9 MG/DL (ref 8.3–10.6)
CHLORIDE BLD-SCNC: 98 MMOL/L (ref 99–110)
CO2: 31 MMOL/L (ref 21–32)
CREAT SERPL-MCNC: 1.1 MG/DL (ref 0.9–1.3)
D DIMER: 924 NG/ML(DDU)
DIFFERENTIAL TYPE: ABNORMAL
EOSINOPHILS ABSOLUTE: 0.1 K/CU MM
EOSINOPHILS RELATIVE PERCENT: 2 % (ref 0–3)
FIBRINOGEN LEVEL: 526 MG/DL (ref 196.9–442.1)
GFR AFRICAN AMERICAN: >60 ML/MIN/1.73M2
GFR NON-AFRICAN AMERICAN: >60 ML/MIN/1.73M2
GLUCOSE BLD-MCNC: 106 MG/DL (ref 70–99)
HCT VFR BLD CALC: 34 % (ref 42–52)
HEMOGLOBIN: 10.7 GM/DL (ref 13.5–18)
IMMATURE NEUTROPHIL %: 1.5 % (ref 0–0.43)
INR BLD: 1.11 INDEX
LYMPHOCYTES ABSOLUTE: 1.6 K/CU MM
LYMPHOCYTES RELATIVE PERCENT: 22.6 % (ref 24–44)
MAGNESIUM: 1.9 MG/DL (ref 1.8–2.4)
MCH RBC QN AUTO: 29.7 PG (ref 27–31)
MCHC RBC AUTO-ENTMCNC: 31.5 % (ref 32–36)
MCV RBC AUTO: 94.4 FL (ref 78–100)
MONOCYTES ABSOLUTE: 0.6 K/CU MM
MONOCYTES RELATIVE PERCENT: 8.3 % (ref 0–4)
NUCLEATED RBC %: 0 %
PDW BLD-RTO: 14.4 % (ref 11.7–14.9)
PLATELET # BLD: 345 K/CU MM (ref 140–440)
PMV BLD AUTO: 9.3 FL (ref 7.5–11.1)
POTASSIUM SERPL-SCNC: 3.3 MMOL/L (ref 3.5–5.1)
PROTHROMBIN TIME: 13.4 SECONDS (ref 11.7–14.5)
RBC # BLD: 3.6 M/CU MM (ref 4.6–6.2)
SEGMENTED NEUTROPHILS ABSOLUTE COUNT: 4.5 K/CU MM
SEGMENTED NEUTROPHILS RELATIVE PERCENT: 64.9 % (ref 36–66)
SODIUM BLD-SCNC: 138 MMOL/L (ref 135–145)
TOTAL IMMATURE NEUTOROPHIL: 0.1 K/CU MM
TOTAL NUCLEATED RBC: 0 K/CU MM
WBC # BLD: 6.9 K/CU MM (ref 4–10.5)

## 2020-08-23 PROCEDURE — 80048 BASIC METABOLIC PNL TOTAL CA: CPT

## 2020-08-23 PROCEDURE — 6370000000 HC RX 637 (ALT 250 FOR IP): Performed by: INTERNAL MEDICINE

## 2020-08-23 PROCEDURE — 2580000003 HC RX 258: Performed by: INTERNAL MEDICINE

## 2020-08-23 PROCEDURE — 6370000000 HC RX 637 (ALT 250 FOR IP): Performed by: HOSPITALIST

## 2020-08-23 PROCEDURE — 6360000002 HC RX W HCPCS: Performed by: INTERNAL MEDICINE

## 2020-08-23 PROCEDURE — 99231 SBSQ HOSP IP/OBS SF/LOW 25: CPT | Performed by: SURGERY

## 2020-08-23 PROCEDURE — 85730 THROMBOPLASTIN TIME PARTIAL: CPT

## 2020-08-23 PROCEDURE — 2580000003 HC RX 258: Performed by: RADIOLOGY

## 2020-08-23 PROCEDURE — 83735 ASSAY OF MAGNESIUM: CPT

## 2020-08-23 PROCEDURE — 94761 N-INVAS EAR/PLS OXIMETRY MLT: CPT

## 2020-08-23 PROCEDURE — 85384 FIBRINOGEN ACTIVITY: CPT

## 2020-08-23 PROCEDURE — 85379 FIBRIN DEGRADATION QUANT: CPT

## 2020-08-23 PROCEDURE — 85610 PROTHROMBIN TIME: CPT

## 2020-08-23 PROCEDURE — 1200000000 HC SEMI PRIVATE

## 2020-08-23 PROCEDURE — 2700000000 HC OXYGEN THERAPY PER DAY

## 2020-08-23 PROCEDURE — 85025 COMPLETE CBC W/AUTO DIFF WBC: CPT

## 2020-08-23 PROCEDURE — 36415 COLL VENOUS BLD VENIPUNCTURE: CPT

## 2020-08-23 RX ORDER — POTASSIUM CHLORIDE 7.45 MG/ML
20 INJECTION INTRAVENOUS ONCE
Status: DISCONTINUED | OUTPATIENT
Start: 2020-08-23 | End: 2020-08-30 | Stop reason: HOSPADM

## 2020-08-23 RX ADMIN — ENTACAPONE 200 MG: 200 TABLET, FILM COATED ORAL at 10:58

## 2020-08-23 RX ADMIN — ASPIRIN 81 MG CHEWABLE TABLET 81 MG: 81 TABLET CHEWABLE at 10:57

## 2020-08-23 RX ADMIN — ENOXAPARIN SODIUM 40 MG: 40 INJECTION SUBCUTANEOUS at 10:58

## 2020-08-23 RX ADMIN — BICALUTAMIDE 50 MG: 50 TABLET ORAL at 10:58

## 2020-08-23 RX ADMIN — ENTACAPONE 200 MG: 200 TABLET, FILM COATED ORAL at 20:44

## 2020-08-23 RX ADMIN — ESCITALOPRAM OXALATE 10 MG: 10 TABLET ORAL at 20:44

## 2020-08-23 RX ADMIN — RIVASTIGMINE TARTRATE 1.5 MG: 1.5 CAPSULE ORAL at 10:57

## 2020-08-23 RX ADMIN — ENTACAPONE 200 MG: 200 TABLET, FILM COATED ORAL at 16:21

## 2020-08-23 RX ADMIN — SODIUM CHLORIDE, PRESERVATIVE FREE 10 ML: 5 INJECTION INTRAVENOUS at 10:59

## 2020-08-23 RX ADMIN — MEROPENEM 1 G: 1 INJECTION, POWDER, FOR SOLUTION INTRAVENOUS at 10:59

## 2020-08-23 RX ADMIN — AMANTADINE HYDROCHLORIDE 100 MG: 100 CAPSULE, LIQUID FILLED ORAL at 20:48

## 2020-08-23 RX ADMIN — POTASSIUM BICARBONATE 40 MEQ: 782 TABLET, EFFERVESCENT ORAL at 10:58

## 2020-08-23 RX ADMIN — ROSUVASTATIN 40 MG: 40 TABLET, FILM COATED ORAL at 10:57

## 2020-08-23 RX ADMIN — AMANTADINE HYDROCHLORIDE 100 MG: 100 CAPSULE, LIQUID FILLED ORAL at 11:07

## 2020-08-23 RX ADMIN — AMLODIPINE BESYLATE 5 MG: 5 TABLET ORAL at 10:58

## 2020-08-23 RX ADMIN — MEROPENEM 1 G: 1 INJECTION, POWDER, FOR SOLUTION INTRAVENOUS at 03:18

## 2020-08-23 RX ADMIN — CARBIDOPA AND LEVODOPA 2 TABLET: 25; 100 TABLET ORAL at 16:20

## 2020-08-23 RX ADMIN — CARBIDOPA AND LEVODOPA 2 TABLET: 25; 100 TABLET ORAL at 10:57

## 2020-08-23 RX ADMIN — CARBIDOPA AND LEVODOPA 2 TABLET: 25; 100 TABLET ORAL at 20:44

## 2020-08-23 RX ADMIN — SODIUM CHLORIDE, PRESERVATIVE FREE 5 ML: 5 INJECTION INTRAVENOUS at 11:08

## 2020-08-23 RX ADMIN — RIVASTIGMINE TARTRATE 1.5 MG: 1.5 CAPSULE ORAL at 20:44

## 2020-08-23 ASSESSMENT — PAIN SCALES - WONG BAKER

## 2020-08-23 ASSESSMENT — PAIN SCALES - GENERAL: PAINLEVEL_OUTOF10: 0

## 2020-08-23 ASSESSMENT — PAIN DESCRIPTION - PAIN TYPE: TYPE: ACUTE PAIN

## 2020-08-23 NOTE — PROGRESS NOTES
Nephrology Progress Note  8/23/2020 9:48 AM  Subjective:   Admit Date: 8/14/2020  PCP: Kelly Duque MD  Interval History: pt taking some po and does have drain and little out  Diet: Dietary Nutrition Supplements: Renal Oral Supplement  DIET GENERAL; Dysphagia Minced and Moist  Dietary Nutrition Supplements: Standard Oral Supplement  Pain is:None      Data:   Scheduled Meds:   potassium chloride  20 mEq Intravenous Once    potassium bicarb-citric acid  40 mEq Oral Once    meropenem  1 g Intravenous Q8H    enoxaparin  40 mg Subcutaneous Daily    amLODIPine  5 mg Oral Daily    sodium chloride flush  5 mL Intracatheter BID    lidocaine  1 patch Transdermal Daily    amantadine  100 mg Oral BID    aspirin  81 mg Oral Daily    escitalopram  10 mg Oral Nightly    rivastigmine  1.5 mg Oral BID    rosuvastatin  40 mg Oral QAM    bicalutamide  50 mg Oral Daily    sodium chloride flush  10 mL Intravenous 2 times per day    carbidopa-levodopa  2 tablet Oral 4x Daily    And    entacapone  200 mg Oral 4x Daily     Continuous Infusions:    PRN Meds:cloNIDine, potassium chloride, sodium chloride flush, acetaminophen **OR** acetaminophen, polyethylene glycol, promethazine **OR** ondansetron, morphine  I/O last 3 completed shifts: In: 36 [P.O.:730]  Out: 3230 [Urine:2500; Drains:5]  No intake/output data recorded. Intake/Output Summary (Last 24 hours) at 8/23/2020 0948  Last data filed at 8/23/2020 6942  Gross per 24 hour   Intake 490 ml   Output 2505 ml   Net -2015 ml     CBC:   Recent Labs     08/21/20  0617 08/22/20  0531 08/23/20  0456   WBC 8.5 7.2 6.9   HGB 9.9* 10.7* 10.7*    327 345     BMP:    Recent Labs     08/21/20  0617 08/22/20  0531 08/23/20  0456    141 138   K 3.6 3.5 3.3*    104 98*   CO2 22 29 31   BUN 20 22 22   CREATININE 1.2 1.1 1.1   GLUCOSE 86 97 106*     Hepatic:   No results for input(s): AST, ALT, ALB, BILITOT, ALKPHOS in the last 72 hours.   Troponin: No results for input(s): TROPONINI in the last 72 hours. BNP: No results for input(s): BNP in the last 72 hours. Lipids: No results for input(s): CHOL, HDL in the last 72 hours. Invalid input(s): LDLCALCU  ABGs: No results found for: PHART, PO2ART, JQQ0WNK  INR:   Recent Labs     08/21/20  0617 08/22/20  0531 08/23/20  0456   INR 1.14 1.11 1.11     Renal Labs  Albumin:    Lab Results   Component Value Date    LABALBU 3.1 08/14/2020     Calcium:    Lab Results   Component Value Date    CALCIUM 8.9 08/23/2020     Phosphorus:  No results found for: PHOS  U/A:    Lab Results   Component Value Date    NITRU NEGATIVE 08/15/2020    COLORU YELLOW 08/15/2020    WBCUA 1 08/15/2020    RBCUA 6 08/15/2020    MUCUS RARE 08/15/2020    TRICHOMONAS NONE SEEN 08/15/2020    BACTERIA NEGATIVE 08/15/2020    CLARITYU CLEAR 08/15/2020    SPECGRAV 1.013 08/15/2020    UROBILINOGEN NORMAL 08/15/2020    BILIRUBINUR NEGATIVE 08/15/2020    BLOODU SMALL 08/15/2020    KETUA MODERATE 08/15/2020     ABG:  No results found for: PHART, SBV9RXO, PO2ART, SUK5ZTK, BEART, THGBART, DGB0VKK, O5NCGFGE  HgBA1c:  No results found for: LABA1C  Microalbumen/Creatinine ratio:  No components found for: RUCREAT          Objective:   Vitals: /72   Pulse 69   Temp 97.6 °F (36.4 °C) (Oral)   Resp 16   Ht 6' 0.99\" (1.854 m)   Wt 156 lb (70.8 kg)   SpO2 92%   BMI 20.59 kg/m²   General appearance: awake weak confused  HEENT: Head: Normal, normocephalic, atraumatic.   Neck: supple, symmetrical, trachea midline  Lungs: diminished breath sounds bilaterally  Heart: S1, S2 normal  Abdomen: abnormal findings:  soft nt  Extremities: edema trace  Neurologic: Mental status: alertness: awake weak hard hearing      Patient Active Problem List:     Weight loss, unintentional     History of colon polyps     Pelvic mass    Assessment and Plan:      IMP:  1 arf from atn/pra/hydro  2 right side mass fluid collection  3 hx prostate ca  4 hyponatremia  5 protein malnutrtion Plan     1 renal holding stable and repelte K  2 has drain and minimal out on abx and surgery monitor, sorry was not aware drain was there prior  3 urology follow  4 na stable  5 monitor oral diet         Rolf Portillo MD

## 2020-08-23 NOTE — PROGRESS NOTES
per day    carbidopa-levodopa  2 tablet Oral 4x Daily    And    entacapone  200 mg Oral 4x Daily      Infusions:   PRN Meds: cloNIDine, 0.1 mg, TID PRN  potassium chloride, 10 mEq, PRN  sodium chloride flush, 10 mL, PRN  acetaminophen, 650 mg, Q6H PRN    Or  acetaminophen, 650 mg, Q6H PRN  polyethylene glycol, 17 g, Daily PRN  promethazine, 12.5 mg, Q6H PRN    Or  ondansetron, 4 mg, Q6H PRN  morphine, 2 mg, Q4H PRN            Pertinent New Labs & Imaging Studies     CBC with Differential:    Lab Results   Component Value Date    WBC 6.9 08/23/2020    RBC 3.60 08/23/2020    HGB 10.7 08/23/2020    HCT 34.0 08/23/2020     08/23/2020    MCV 94.4 08/23/2020    MCH 29.7 08/23/2020    MCHC 31.5 08/23/2020    RDW 14.4 08/23/2020    SEGSPCT 64.9 08/23/2020    LYMPHOPCT 22.6 08/23/2020    MONOPCT 8.3 08/23/2020    BASOPCT 0.7 08/23/2020    MONOSABS 0.6 08/23/2020    LYMPHSABS 1.6 08/23/2020    EOSABS 0.1 08/23/2020    BASOSABS 0.1 08/23/2020    DIFFTYPE AUTOMATED DIFFERENTIAL 08/23/2020     CMP:    Lab Results   Component Value Date     08/23/2020    K 3.3 08/23/2020    CL 98 08/23/2020    CO2 31 08/23/2020    BUN 22 08/23/2020    CREATININE 1.1 08/23/2020    GFRAA >60 08/23/2020    LABGLOM >60 08/23/2020    GLUCOSE 106 08/23/2020    PROT 6.8 08/14/2020    LABALBU 3.1 08/14/2020    CALCIUM 8.9 08/23/2020    BILITOT 0.4 08/14/2020    ALKPHOS 98 08/14/2020    AST 11 08/14/2020    ALT <5 08/14/2020     Ct Abdomen Pelvis Wo Contrast Additional Contrast? None    Result Date: 8/16/2020  EXAMINATION: CT OF THE ABDOMEN AND PELVIS WITHOUT CONTRAST 8/14/2020 9:52 pm TECHNIQUE: CT of the abdomen and pelvis was performed without the administration of intravenous contrast. Multiplanar reformatted images are provided for review. Dose modulation, iterative reconstruction, and/or weight based adjustment of the mA/kV was utilized to reduce the radiation dose to as low as reasonably achievable. COMPARISON: None.  HISTORY: ORDERING SYSTEM PROVIDED HISTORY: nausea, vomiting, abdominal pain TECHNOLOGIST PROVIDED HISTORY: Reason for exam:->nausea, vomiting, abdominal pain Additional Contrast?->None Reason for Exam: nausea, vomiting, abdominal pain Acuity: Acute Type of Exam: Initial FINDINGS: Lower Chest: There is dependent consolidation in the lungs. Organs: There is mild right hydronephrosis. There is no obstructing stone. No acute abnormality of the liver, spleen, pancreas, adrenals, gallbladder, or left kidney. GI/Bowel: Bowel caliber is normal.  There is no evidence of active bowel inflammation. There is no evidence of acute appendicitis. Pelvis: In the right pelvic sidewall is a 56 x 67 x 66 mm (approximately) ill-defined mass adjacent to the iliac vessels. Borders of the mass are ill-defined. It medially displaces the right ureter. The mass appears somewhat elongated along the course of the iliac vein. The mass has an average density of 35 Hounsfield units. The urinary bladder is unremarkable. There are clips from prostatectomy. Peritoneum/Retroperitoneum: See above. No free air or free fluid. Bones/Soft Tissues: No acute osseous abnormality. Right pelvic mass. Differential diagnosis includes subacute hematoma or infiltrative malignancy. Mild right hydronephrosis due to compression of the ureter by the mass. Bibasilar pulmonary consolidation or atelectasis. Ct Head Wo Contrast    Result Date: 8/15/2020  EXAMINATION: CT OF THE HEAD WITHOUT CONTRAST 8/15/2020 6:09 pm TECHNIQUE: CT of the head was performed without the administration of intravenous contrast. Dose modulation, iterative reconstruction, and/or weight based adjustment of the mA/kV was utilized to reduce the radiation dose to as low as reasonably achievable. COMPARISON: Brain MRI 04/09/2018 HISTORY: ORDERING SYSTEM PROVIDED HISTORY: lethargic TECHNOLOGIST PROVIDED HISTORY: Reason for exam:->lethargic Has a \"code stroke\" or \"stroke alert\" been called? ->No Reason for Exam: lethargic Acuity: Acute Type of Exam: Initial FINDINGS: Patient motion in some areas, partially limiting evaluation of those areas. BRAIN/VENTRICLES:  No masses nor acute intracranial hemorrhage. Intact gray/white matter differentiation without findings of acute ischemia. No mass effect nor midline shift. Patent basilar cisterns and foramen magnum. No hydrocephalus. Age-appropriate mild to moderate diffuse atrophy. Mild to moderate subcortical, deep, and periventricular white matter hypodensities likely due to chronic small vessel ischemia with additional pontine involvement. ORBITS:  Bilateral lens implants. Otherwise normal without acute abnormality. SINUSES:  Normally pneumatized and aerated. SOFT TISSUES/SKULL:  No acute soft tissue abnormality. Moderate atherosclerotic calcifications. No acute fracture. 1. No acute intracranial abnormality. 2. Chronic changes as above. Assessment and Plan:   Soni Wiseman is a 79 y.o.  male  who presents with Pelvic mass    Pelvic abscess s/p IR assisted drainage  CT A/P with right pelvic mass with right hydronephrosis  CT urography with 5.5 x 4.7 cm collection in the right pelvis with midl peripheral enhancement extending into the right iliopsoas muscle as well as obstruction of the right ureter with hydroureteronephrosis. Also fluid filled thick walled structure extending from the cecum towards this collection raising suspicion for perforated appendicitis with associated abscess   Not sure about the origin ? Perforated appendicitis  Fluids appear to be purulent  Fluid cultures with GPC and Gram negative bacilli, Cytology pending  Blood cultures negative  Continue Meropenem   MRSA screening negative  General surgery recommendations appreciated  Urology recommendations appreciated    Acute metabolic encephalopathy-Improved  Likely from sepsis    Acute respiratory failure with Hypoxia-Improved  Pneumonia  COVID 19 test negative  Continue abx as above  Given  lasix   X ray with worsening bilateral airspace dz  Pro BNP slightly elevated     SLIM - Improved  Likely from ATN with post obstructive uropathy  Hydronephrosis on kidney  Nephrology recommendations appreciated    Moderate PCM  Dietary following, appreciated recommendations    Prostate cancer   S/p Prostatectomy    Anemia of Chronic disorders    Dementia with Parkinson's Dz    Diet Dietary Nutrition Supplements: Renal Oral Supplement  DIET GENERAL; Dysphagia Minced and Moist  Dietary Nutrition Supplements: Standard Oral Supplement   DVT Prophylaxis [x] Lovenox, []  Heparin, [] SCDs, [] Ambulation   GI Prophylaxis [] PPI,  [] H2 Blocker,  [] Carafate,  [x] Diet/Tube Feeds   Code Status Full Code   Disposition Needed  Placement   MDM [] Low, [] Moderate,[]  High      Electronically signed by Olu Burnett MD on 8/23/2020 at 11:34 AM

## 2020-08-23 NOTE — FLOWSHEET NOTE
Patient is unable to tolerate IV infusion of potassium, Dr. Mahogany Ruelas ordered 40meq po that patient took without difficulty. Dr. Jewel Tapia notified and orders obtained to d/c IV infusion.

## 2020-08-23 NOTE — PROGRESS NOTES
GENERAL SURGERY PROGRESS NOTE    Sukhjinder Dias is a 79 y.o. male s/p IR drain of pelvic fluid collection on 8/20. Subjective:  Doing ok this morning. Denies significant abdominal pain. Drain with 5cc of brown output. Objective:    Vitals: VITALS:  /72   Pulse 69   Temp 97.6 °F (36.4 °C) (Oral)   Resp 16   Ht 6' 0.99\" (1.854 m)   Wt 156 lb (70.8 kg)   SpO2 92%   BMI 20.59 kg/m²     I/O: 08/22 0701 - 08/23 0700  In: 730 [P.O.:730]  Out: 2505 [Urine:2500; Drains:5]    Labs/Imaging Results:   Lab Results   Component Value Date     08/23/2020    K 3.3 08/23/2020    CL 98 08/23/2020    CO2 31 08/23/2020    BUN 22 08/23/2020    CREATININE 1.1 08/23/2020    GLUCOSE 106 08/23/2020    CALCIUM 8.9 08/23/2020      Lab Results   Component Value Date    WBC 6.9 08/23/2020    HGB 10.7 (L) 08/23/2020    HCT 34.0 (L) 08/23/2020    MCV 94.4 08/23/2020     08/23/2020         Scheduled Meds:   meropenem, 1 g, Intravenous, Q8H    enoxaparin, 40 mg, Subcutaneous, Daily    amLODIPine, 5 mg, Oral, Daily    sodium chloride flush, 5 mL, Intracatheter, BID    lidocaine, 1 patch, Transdermal, Daily    amantadine, 100 mg, Oral, BID    aspirin, 81 mg, Oral, Daily    escitalopram, 10 mg, Oral, Nightly    rivastigmine, 1.5 mg, Oral, BID    rosuvastatin, 40 mg, Oral, QAM    bicalutamide, 50 mg, Oral, Daily    sodium chloride flush, 10 mL, Intravenous, 2 times per day    carbidopa-levodopa, 2 tablet, Oral, 4x Daily **AND** entacapone, 200 mg, Oral, 4x Daily    Physical Exam:  General: A&O x 3, no distress. HEENT: Anicteric sclerae, MMM. Extremities: No edema bilat LE. Abdomen: Soft, nontender, nondistended. RLQ drain in place with brown drainage--5cc output recorded    Assessment & plan:  Aldair Salcido [place] is a very pleasant 79 y. o. male presenting with a pelvic mass, ? Perforated appendicitis? .     Cytology pending from IR biopsy.   Culture with GPC and fusobacterium/GNR      - monitor drain output  - continue antibiotics  - will follow cytology, no plan for surgical intervention unless pathology is worrisome for cancer    Del Barron MD   8/23/2020   11:45 AM

## 2020-08-24 ENCOUNTER — APPOINTMENT (OUTPATIENT)
Dept: ULTRASOUND IMAGING | Age: 80
DRG: 371 | End: 2020-08-24
Payer: MEDICARE

## 2020-08-24 LAB
ANION GAP SERPL CALCULATED.3IONS-SCNC: 6 MMOL/L (ref 4–16)
APTT: 53.5 SECONDS (ref 25.1–37.1)
BASOPHILS ABSOLUTE: 0.1 K/CU MM
BASOPHILS RELATIVE PERCENT: 0.9 % (ref 0–1)
BUN BLDV-MCNC: 22 MG/DL (ref 6–23)
CALCIUM SERPL-MCNC: 9.3 MG/DL (ref 8.3–10.6)
CHLORIDE BLD-SCNC: 101 MMOL/L (ref 99–110)
CO2: 33 MMOL/L (ref 21–32)
CREAT SERPL-MCNC: 1.1 MG/DL (ref 0.9–1.3)
D DIMER: 876 NG/ML(DDU)
DIFFERENTIAL TYPE: ABNORMAL
EOSINOPHILS ABSOLUTE: 0.1 K/CU MM
EOSINOPHILS RELATIVE PERCENT: 1.6 % (ref 0–3)
FIBRINOGEN LEVEL: 513 MG/DL (ref 196.9–442.1)
GFR AFRICAN AMERICAN: >60 ML/MIN/1.73M2
GFR NON-AFRICAN AMERICAN: >60 ML/MIN/1.73M2
GLUCOSE BLD-MCNC: 101 MG/DL (ref 70–99)
HCT VFR BLD CALC: 36.5 % (ref 42–52)
HEMOGLOBIN: 11.2 GM/DL (ref 13.5–18)
HIGH SENSITIVE C-REACTIVE PROTEIN: 70.8 MG/L
IMMATURE NEUTROPHIL %: 1.2 % (ref 0–0.43)
INR BLD: 1.06 INDEX
LYMPHOCYTES ABSOLUTE: 1.6 K/CU MM
LYMPHOCYTES RELATIVE PERCENT: 24.1 % (ref 24–44)
MAGNESIUM: 2.1 MG/DL (ref 1.8–2.4)
MCH RBC QN AUTO: 29.5 PG (ref 27–31)
MCHC RBC AUTO-ENTMCNC: 30.7 % (ref 32–36)
MCV RBC AUTO: 96.1 FL (ref 78–100)
MONOCYTES ABSOLUTE: 0.5 K/CU MM
MONOCYTES RELATIVE PERCENT: 7.4 % (ref 0–4)
NUCLEATED RBC %: 0 %
PDW BLD-RTO: 14.2 % (ref 11.7–14.9)
PLATELET # BLD: 335 K/CU MM (ref 140–440)
PMV BLD AUTO: 9.2 FL (ref 7.5–11.1)
POTASSIUM SERPL-SCNC: 3.9 MMOL/L (ref 3.5–5.1)
PROCALCITONIN: 0.12
PROTHROMBIN TIME: 12.8 SECONDS (ref 11.7–14.5)
RBC # BLD: 3.8 M/CU MM (ref 4.6–6.2)
SEGMENTED NEUTROPHILS ABSOLUTE COUNT: 4.4 K/CU MM
SEGMENTED NEUTROPHILS RELATIVE PERCENT: 64.8 % (ref 36–66)
SODIUM BLD-SCNC: 140 MMOL/L (ref 135–145)
TOTAL IMMATURE NEUTOROPHIL: 0.08 K/CU MM
TOTAL NUCLEATED RBC: 0 K/CU MM
WBC # BLD: 6.7 K/CU MM (ref 4–10.5)

## 2020-08-24 PROCEDURE — 2700000000 HC OXYGEN THERAPY PER DAY

## 2020-08-24 PROCEDURE — 97530 THERAPEUTIC ACTIVITIES: CPT

## 2020-08-24 PROCEDURE — 85025 COMPLETE CBC W/AUTO DIFF WBC: CPT

## 2020-08-24 PROCEDURE — 6370000000 HC RX 637 (ALT 250 FOR IP): Performed by: NURSE PRACTITIONER

## 2020-08-24 PROCEDURE — C1751 CATH, INF, PER/CENT/MIDLINE: HCPCS

## 2020-08-24 PROCEDURE — 85379 FIBRIN DEGRADATION QUANT: CPT

## 2020-08-24 PROCEDURE — 94761 N-INVAS EAR/PLS OXIMETRY MLT: CPT

## 2020-08-24 PROCEDURE — 83735 ASSAY OF MAGNESIUM: CPT

## 2020-08-24 PROCEDURE — 2580000003 HC RX 258: Performed by: INTERNAL MEDICINE

## 2020-08-24 PROCEDURE — 85610 PROTHROMBIN TIME: CPT

## 2020-08-24 PROCEDURE — 6360000002 HC RX W HCPCS: Performed by: INTERNAL MEDICINE

## 2020-08-24 PROCEDURE — 86141 C-REACTIVE PROTEIN HS: CPT

## 2020-08-24 PROCEDURE — 6370000000 HC RX 637 (ALT 250 FOR IP): Performed by: HOSPITALIST

## 2020-08-24 PROCEDURE — 93971 EXTREMITY STUDY: CPT

## 2020-08-24 PROCEDURE — 76937 US GUIDE VASCULAR ACCESS: CPT

## 2020-08-24 PROCEDURE — 84145 PROCALCITONIN (PCT): CPT

## 2020-08-24 PROCEDURE — 6370000000 HC RX 637 (ALT 250 FOR IP): Performed by: INTERNAL MEDICINE

## 2020-08-24 PROCEDURE — 1200000000 HC SEMI PRIVATE

## 2020-08-24 PROCEDURE — 97535 SELF CARE MNGMENT TRAINING: CPT

## 2020-08-24 PROCEDURE — 80048 BASIC METABOLIC PNL TOTAL CA: CPT

## 2020-08-24 PROCEDURE — 05HD33Z INSERTION OF INFUSION DEVICE INTO RIGHT CEPHALIC VEIN, PERCUTANEOUS APPROACH: ICD-10-PCS | Performed by: INTERNAL MEDICINE

## 2020-08-24 PROCEDURE — 36410 VNPNXR 3YR/> PHY/QHP DX/THER: CPT

## 2020-08-24 PROCEDURE — 99232 SBSQ HOSP IP/OBS MODERATE 35: CPT | Performed by: SURGERY

## 2020-08-24 PROCEDURE — 6360000002 HC RX W HCPCS: Performed by: NURSE PRACTITIONER

## 2020-08-24 PROCEDURE — 85384 FIBRINOGEN ACTIVITY: CPT

## 2020-08-24 PROCEDURE — 85730 THROMBOPLASTIN TIME PARTIAL: CPT

## 2020-08-24 PROCEDURE — 36415 COLL VENOUS BLD VENIPUNCTURE: CPT

## 2020-08-24 RX ADMIN — MORPHINE SULFATE 2 MG: 2 INJECTION, SOLUTION INTRAMUSCULAR; INTRAVENOUS at 10:24

## 2020-08-24 RX ADMIN — MEROPENEM 1 G: 1 INJECTION, POWDER, FOR SOLUTION INTRAVENOUS at 18:44

## 2020-08-24 RX ADMIN — AMANTADINE HYDROCHLORIDE 100 MG: 100 CAPSULE, LIQUID FILLED ORAL at 21:04

## 2020-08-24 RX ADMIN — MEROPENEM 1 G: 1 INJECTION, POWDER, FOR SOLUTION INTRAVENOUS at 10:25

## 2020-08-24 RX ADMIN — RIVASTIGMINE TARTRATE 1.5 MG: 1.5 CAPSULE ORAL at 21:01

## 2020-08-24 RX ADMIN — ENOXAPARIN SODIUM 40 MG: 40 INJECTION SUBCUTANEOUS at 10:25

## 2020-08-24 RX ADMIN — RIVASTIGMINE TARTRATE 1.5 MG: 1.5 CAPSULE ORAL at 10:25

## 2020-08-24 RX ADMIN — CARBIDOPA AND LEVODOPA 2 TABLET: 25; 100 TABLET ORAL at 12:48

## 2020-08-24 RX ADMIN — ASPIRIN 81 MG CHEWABLE TABLET 81 MG: 81 TABLET CHEWABLE at 10:24

## 2020-08-24 RX ADMIN — CARBIDOPA AND LEVODOPA 2 TABLET: 25; 100 TABLET ORAL at 21:01

## 2020-08-24 RX ADMIN — AMANTADINE HYDROCHLORIDE 100 MG: 100 CAPSULE, LIQUID FILLED ORAL at 10:31

## 2020-08-24 RX ADMIN — ENTACAPONE 200 MG: 200 TABLET, FILM COATED ORAL at 21:01

## 2020-08-24 RX ADMIN — CARBIDOPA AND LEVODOPA 2 TABLET: 25; 100 TABLET ORAL at 10:24

## 2020-08-24 RX ADMIN — ENTACAPONE 200 MG: 200 TABLET, FILM COATED ORAL at 18:44

## 2020-08-24 RX ADMIN — ROSUVASTATIN 40 MG: 40 TABLET, FILM COATED ORAL at 10:24

## 2020-08-24 RX ADMIN — SODIUM CHLORIDE, PRESERVATIVE FREE 10 ML: 5 INJECTION INTRAVENOUS at 10:27

## 2020-08-24 RX ADMIN — CARBIDOPA AND LEVODOPA 2 TABLET: 25; 100 TABLET ORAL at 18:44

## 2020-08-24 RX ADMIN — SODIUM CHLORIDE, PRESERVATIVE FREE 10 ML: 5 INJECTION INTRAVENOUS at 21:05

## 2020-08-24 RX ADMIN — AMLODIPINE BESYLATE 5 MG: 5 TABLET ORAL at 10:24

## 2020-08-24 RX ADMIN — ENTACAPONE 200 MG: 200 TABLET, FILM COATED ORAL at 10:25

## 2020-08-24 RX ADMIN — BICALUTAMIDE 50 MG: 50 TABLET ORAL at 10:25

## 2020-08-24 RX ADMIN — ESCITALOPRAM OXALATE 10 MG: 10 TABLET ORAL at 21:01

## 2020-08-24 RX ADMIN — ENTACAPONE 200 MG: 200 TABLET, FILM COATED ORAL at 12:48

## 2020-08-24 ASSESSMENT — PAIN SCALES - WONG BAKER
WONGBAKER_NUMERICALRESPONSE: 0

## 2020-08-24 ASSESSMENT — PAIN DESCRIPTION - PAIN TYPE: TYPE: ACUTE PAIN

## 2020-08-24 ASSESSMENT — PAIN SCALES - GENERAL
PAINLEVEL_OUTOF10: 0
PAINLEVEL_OUTOF10: 8

## 2020-08-24 ASSESSMENT — PAIN DESCRIPTION - LOCATION: LOCATION: LEG

## 2020-08-24 NOTE — PROGRESS NOTES
Continue Oral Nutrition Supplement     Electronically signed by Zan Carranza RD, LD on 8/24/20 at 12:42 PM EDT    Contact: [phone]

## 2020-08-24 NOTE — PROGRESS NOTES
Occupational Therapy  . Occupational Therapy Treatment Note  Name: Tanisha Pandey MRN: [id number] :   1940   Date:  2020   Admission Date: 2020 Room:  [address] [place] [address]-A   Restrictions/Precautions:    General precautions; Fall Risk    Communication with other providers:  Per chart review and Nurse Ally, patient is appropriate for therapeutic intervention. Co-Tx c PT [facility] for this pt who requires simultaneous therapeutic intervention during functional transfers. Subjective:  Patient states:  Pt agreeable to Tx session. \"I want to stand. \"  Pain:   Location, Type, Intensity (0/10 to 10/10):  0/10, denies pain. Objective:    Observation:  Pt received in semi-fowlers c nurse present in room. Pt exhibits tendency to cross legs and exhibits L-side lean. Objective Measures:  Pocket Telemetry, O2 2L NC, Renee catheter, RUE IV    Treatment, including education:  Therapeutic Activity Training:   Therapeutic activity training was instructed today. Cues were given for safety, sequence, UE/LE placement, awareness, and balance. Activities performed today included bed mobility training, sup-sit, sit-stand, SPT. Supine to sit: Mod A x2 + cues for balance / sequencing  Sit to supine: Not addressed  Scooting: Mod A for B hipsSitting balance / tolerance: Initially Max A x1, quickly progressing to Mod A x1 or Min A x2, then CGA x1. Pt tolerated ~10 minutes sitting EOB, exhibits L-side lean, requiring verbal / tactile cues for correction. With emphasis on neuromuscular re-education, cues were given for position, posture,  This therapist provided physical cues for positioning of BLE and for posture in addition to those provided by PT. See PT note for additional details of Tx session. At end of Tx session, pt positioned for midline posture c pillow / blanket bolster at L-side and for weightbearing through LUE. Sit<>stands:  Max A x1 + Mod A x1  Stand Pivot Transfer: Max Ax1 + Mod A x1 w/o AD + verbal cues for safe body positioning and patient performing partial stand, improved over squat pivot, not full stand. Self Care Training:   Cues were given for safety, sequence, UE/LE placement, visual cues, and balance. Activities performed today included ADL tasks at EOB c emphasis on dynamic sitting balance and BUE functional reaching. Grooming: Max A to complete hair care s/p pt brushed top of head c meager movements + min vc's. Min A for steadying assist seated EOB while pt followed cueing to wash face, required one cue for completion of wash to R-side of face . Toileting: With emphasis on sit<>stand transfer training, pt performed Max A + Mod A for partial sit<>stands for placement / removal of bed pan. Dep for hygiene s/p attempt for BM. All therapeutic intervention performed c emphasis on dynamic balance / standing tolerance to inc strength, endurance and act tolerance for inc Indep c ADL tasks, func transfers / mobility. Safety  Patient safely in bedside chair + alarm placedat end of session, with call light/phone in reach, and nursing aware. Gait belt was used for func transfers / mobility. Assessment / Impression:        Patient's tolerance of treatment:  Well   Adverse Reaction: None  Significant change in status and impact:  Progressing mobility from initial evaluation, continues to require two person assist, appears motivated  Barriers to improvement:  Decreased cognition, decreased balance, decreased strength    Plan for Next Session:    Continue per OT POC.     Time in:  1025  Time out:  1057  Timed treatment minutes:  32  Total treatment time:  32    Electronically signed by:    JIGAR Choi  8/24/2020, 9:36 AM    Previously filed values:    Goals:  Pt goal: go home  Time Frame for STGs: discharge  Goal 1: Pt will perform UE ADLs Supervision  Goal 2: Pt will perform LE ADLs Geovanny w/ AD  Goal 3: Pt will perform toileting Geovanny  Goal 4: Pt will perform functional transfer w/ AD Geovanny  Goal 5: Pt will perform functional mobility w/ AD Geovanny  Goal 6: Pt will perform therex/theract in order to increase functional activity tolerance and dynamic standing balance

## 2020-08-24 NOTE — PROGRESS NOTES
GENERAL SURGERY PROGRESS NOTE    CC/HPI:           Patient feels better and eating OK. Vitals:    08/23/20 2041 08/24/20 0530 08/24/20 0955 08/24/20 1022   BP: 112/65 134/71 (!) 154/89 133/68   Pulse: 72 71 75 67   Resp: 16 16 16 18   Temp: 97.9 °F (36.6 °C) 97.7 °F (36.5 °C) 97.9 °F (36.6 °C) 98.1 °F (36.7 °C)   TempSrc: Axillary Axillary Axillary Oral   SpO2: 97% 90% 97% 100%   Weight:       Height:         I/O last 3 completed shifts: In: 300 [P.O.:300]  Out: 1120 [Urine:1100; Drains:20]  No intake/output data recorded.     DIET GENERAL; Dysphagia Minced and Moist  Dietary Nutrition Supplements: Standard Oral Supplement    Recent Results (from the past 50 hour(s))   Protime-INR    Collection Time: 08/23/20  4:56 AM   Result Value Ref Range    Protime 13.4 11.7 - 14.5 SECONDS    INR 1.11 INDEX   APTT    Collection Time: 08/23/20  4:56 AM   Result Value Ref Range    aPTT 50.9 (H) 25.1 - 37.1 SECONDS   Fibrinogen    Collection Time: 08/23/20  4:56 AM   Result Value Ref Range    Fibrinogen 526 (H) 196.9 - 442.1 MG/DL   D-Dimer, Quantitative    Collection Time: 08/23/20  4:56 AM   Result Value Ref Range    D-Dimer, Quant 924 (H) <230 NG/mL(DDU)   CBC Auto Differential    Collection Time: 08/23/20  4:56 AM   Result Value Ref Range    WBC 6.9 4.0 - 10.5 K/CU MM    RBC 3.60 (L) 4.6 - 6.2 M/CU MM    Hemoglobin 10.7 (L) 13.5 - 18.0 GM/DL    Hematocrit 34.0 (L) 42 - 52 %    MCV 94.4 78 - 100 FL    MCH 29.7 27 - 31 PG    MCHC 31.5 (L) 32.0 - 36.0 %    RDW 14.4 11.7 - 14.9 %    Platelets 859 237 - 565 K/CU MM    MPV 9.3 7.5 - 11.1 FL    Differential Type AUTOMATED DIFFERENTIAL     Segs Relative 64.9 36 - 66 %    Lymphocytes % 22.6 (L) 24 - 44 %    Monocytes % 8.3 (H) 0 - 4 %    Eosinophils % 2.0 0 - 3 %    Basophils % 0.7 0 - 1 %    Segs Absolute 4.5 K/CU MM    Lymphocytes Absolute 1.6 K/CU MM    Monocytes Absolute 0.6 K/CU MM    Eosinophils Absolute 0.1 K/CU MM    Basophils Absolute 0.1 K/CU MM    Nucleated RBC % 0.0 %    Total Nucleated RBC 0.0 K/CU MM    Total Immature Neutrophil 0.10 K/CU MM    Immature Neutrophil % 1.5 (H) 0 - 0.43 %   Basic metabolic panel    Collection Time: 08/23/20  4:56 AM   Result Value Ref Range    Sodium 138 135 - 145 MMOL/L    Potassium 3.3 (L) 3.5 - 5.1 MMOL/L    Chloride 98 (L) 99 - 110 mMol/L    CO2 31 21 - 32 MMOL/L    Anion Gap 9 4 - 16    BUN 22 6 - 23 MG/DL    CREATININE 1.1 0.9 - 1.3 MG/DL    Glucose 106 (H) 70 - 99 MG/DL    Calcium 8.9 8.3 - 10.6 MG/DL    GFR Non-African American >60 >60 mL/min/1.73m2    GFR African American >60 >60 mL/min/1.73m2   Magnesium    Collection Time: 08/23/20  4:56 AM   Result Value Ref Range    Magnesium 1.9 1.8 - 2.4 mg/dl   Protime-INR    Collection Time: 08/24/20  4:48 AM   Result Value Ref Range    Protime 12.8 11.7 - 14.5 SECONDS    INR 1.06 INDEX   APTT    Collection Time: 08/24/20  4:48 AM   Result Value Ref Range    aPTT 53.5 (H) 25.1 - 37.1 SECONDS   Fibrinogen    Collection Time: 08/24/20  4:48 AM   Result Value Ref Range    Fibrinogen 513 (H) 196.9 - 442.1 MG/DL   D-Dimer, Quantitative    Collection Time: 08/24/20  4:48 AM   Result Value Ref Range    D-Dimer, Quant 876 (H) <230 NG/mL(DDU)   CBC Auto Differential    Collection Time: 08/24/20  4:48 AM   Result Value Ref Range    WBC 6.7 4.0 - 10.5 K/CU MM    RBC 3.80 (L) 4.6 - 6.2 M/CU MM    Hemoglobin 11.2 (L) 13.5 - 18.0 GM/DL    Hematocrit 36.5 (L) 42 - 52 %    MCV 96.1 78 - 100 FL    MCH 29.5 27 - 31 PG    MCHC 30.7 (L) 32.0 - 36.0 %    RDW 14.2 11.7 - 14.9 %    Platelets 463 261 - 376 K/CU MM    MPV 9.2 7.5 - 11.1 FL    Differential Type AUTOMATED DIFFERENTIAL     Segs Relative 64.8 36 - 66 %    Lymphocytes % 24.1 24 - 44 %    Monocytes % 7.4 (H) 0 - 4 %    Eosinophils % 1.6 0 - 3 %    Basophils % 0.9 0 - 1 %    Segs Absolute 4.4 K/CU MM    Lymphocytes Absolute 1.6 K/CU MM    Monocytes Absolute 0.5 K/CU MM    Eosinophils Absolute 0.1 K/CU MM    Basophils Absolute 0.1 K/CU MM    Nucleated RBC % 0.0 %    Total Nucleated RBC 0.0 K/CU MM    Total Immature Neutrophil 0.08 K/CU MM    Immature Neutrophil % 1.2 (H) 0 - 0.43 %   Basic metabolic panel    Collection Time: 08/24/20  4:48 AM   Result Value Ref Range    Sodium 140 135 - 145 MMOL/L    Potassium 3.9 3.5 - 5.1 MMOL/L    Chloride 101 99 - 110 mMol/L    CO2 33 (H) 21 - 32 MMOL/L    Anion Gap 6 4 - 16    BUN 22 6 - 23 MG/DL    CREATININE 1.1 0.9 - 1.3 MG/DL    Glucose 101 (H) 70 - 99 MG/DL    Calcium 9.3 8.3 - 10.6 MG/DL    GFR Non-African American >60 >60 mL/min/1.73m2    GFR African American >60 >60 mL/min/1.73m2   Magnesium    Collection Time: 08/24/20  4:48 AM   Result Value Ref Range    Magnesium 2.1 1.8 - 2.4 mg/dl   C-Reactive Protein    Collection Time: 08/24/20  4:48 AM   Result Value Ref Range    CRP, High Sensitivity 70.8 mg/L   Procalcitonin    Collection Time: 08/24/20  4:48 AM   Result Value Ref Range    Procalcitonin 0.121        Scheduled Meds:   potassium chloride  20 mEq Intravenous Once    meropenem  1 g Intravenous Q8H    enoxaparin  40 mg Subcutaneous Daily    amLODIPine  5 mg Oral Daily    sodium chloride flush  5 mL Intracatheter BID    lidocaine  1 patch Transdermal Daily    amantadine  100 mg Oral BID    aspirin  81 mg Oral Daily    escitalopram  10 mg Oral Nightly    rivastigmine  1.5 mg Oral BID    rosuvastatin  40 mg Oral QAM    bicalutamide  50 mg Oral Daily    sodium chloride flush  10 mL Intravenous 2 times per day    carbidopa-levodopa  2 tablet Oral 4x Daily    And    entacapone  200 mg Oral 4x Daily       Continuous Infusions:      Physical Exam:  HEENT: Anicteric sclerae, Oropharyngeal mucosae moist, pink and intact. Heart:  Normal S1 and S2, RRR  Lungs: Clear to auscultation bilaterally, No audible Wheezes or Rales. Extremities: No edema. Neuro: Alert and Oriented x 3, Non focal.  Abdomen: Soft, Benign, not MILDLY tender lower abdomen. ., Non distended, Positive bowel sounds.       Principal Problem: male presenting with a pelvic mass,  Perforated appendicitis, final cytology still pending.     Percutaneous biopsy was done last week, and sent to the lab and pathology for cytology I asked IR to. . I'll manage according to its result. Due to his EXTREME Fragility. . ONLY if neoplastic would require surgery, but if it is ONLY perforated appendicitis, will hopefully manage non operatively. .      ___________________________________________    Lili Mart MD, FACS, FICS  8/24/2020  1:47 PM

## 2020-08-24 NOTE — PROGRESS NOTES
Physical Therapy Treatment Note  Name: Carter Ruffin MRN: [id number] :   1940   Date:  2020   Admission Date: 2020 Room:  [address] [place], [place] [address]   Restrictions/Precautions:        General rehab precautions, Fall risk, jones catheter, KANA drain x1    Communication with other providers:  Handoff to RN, co-treat with Jovanny Stroud. Subjective:  Patient states:  Patient amenable to therapy. Pain:   Location, Type, Intensity (0/10 to 10/10): Denied pain throughout entirety of session. Objective:    Bed mobility: max assist x2, STS: max assist x1 and mod assist of another, Stand pivot transfer: max assist x1 and mod assist of another for partial stand-pivot, Gait: unable. Performed without AD. Treatment, including education/measures:    Therapeutic Activity Training:   Therapeutic activity training was instructed today. Cues were given for safety, sequence, UE/LE placement, awareness, and balance. Activities performed today included bed mobility training, sup-sit, sit-stand, SPT. Bed mobility completed with HOB elevated to ease burden of movement. Step-by-step cues for sequencing LEs to EOB, pt demos good carryover with cues. Max assist required for trunk elevation, max assist required to scoot forward to EOB. Pt reported need to have BM upon sitting up at EOB, pt completed partial stand x2 with max assist x1 and mod assist of another for bed pan placement/removal. Cues for appropriate hand placement and propulsion through BLEs to maximize independence. Partial stand-pivot to recliner with max assist x1 and mod assist of another, cues for upright posture throughout transfer with minimal carryover noted d/t chronic kyphotic posturing. No LOB or knee buckling with transfer. At end of session pt seated in recliner with chair alarm and call light in reach, RN notified.     Sitting balance:  Patient able to sit EOB with fluctuating levels of assist, initially mod assist progressing to CGA with brief periods of SBA. Pt with tendency for L lateral lean. Tactile and verbal cues for abdominal muscle activation to facilitate midline posturing. R elbow propping completed to facilitate orientation to midline and improved L abdominal muscle length. Standing balance:   Patient performed standing balance without AD with max assist x1 and mod assist of another. Cues for erect posture and attention to midline d/t persistent tendency for L lateral lean. Assessment / Impression:       Patient's tolerance of treatment:  Tolerated session well. Adverse Reaction: None noted  Significant change in status and impact:  Improved mobility and out of bed tolerance. Barriers to improvement:  Strength, balance, endurance    Plan for Next Session:    Progress independence with bed mobility, transfers, and gait per POC.   Time in:  1025  Time out:  1057  Timed treatment minutes:  32  Total treatment time:  32    Previously filed items:     Short term goals  Time Frame for Short term goals: 1 week  Short term goal 1: Pt will roll bilat modA  Short term goal 2: Pt will transition sit><supine modA  Short term goal 3: Pt will transfer between surfaces maxA  Short term goal 4: Pt will perform light dynamic sitting activity with single UE support x 5 minutes CGA       Electronically signed by:    Nayla Dawkins, PT  8/24/2020, 11:02 AM

## 2020-08-24 NOTE — PROGRESS NOTES
Hospitalist Progress Note      Name:  Brad Aaron /Age/Sex: 1940  (79 y.o. male)   MRN & CSN:  [id number] & [id number] Admission Date/Time: 2020  7:25 PM   Location:  [address] [place] [address] PCP: Alie Rosario MD         Hospital Day: 11    History of Present Illness:     Chief Complaint: Pelvic mass  Brad Aaron is a 79 y.o.  male  who presents with Pelvic mass     The patient seen and examined at bed side. He denies having any pain or SOB. Ten point ROS reviewed negative, unless as noted above    Objective: Intake/Output Summary (Last 24 hours) at 2020 1400  Last data filed at 2020 0640  Gross per 24 hour   Intake 300 ml   Output 1120 ml   Net -820 ml      Vitals:   Vitals:    20 1022   BP: 133/68   Pulse: 67   Resp: 18   Temp: 98.1 °F (36.7 °C)   SpO2: 100%     Physical Exam:   General Appearance: alert and awake in no apparent distress  Cardiovascular: normal rate, regular rhythm, normal S1 and S2, no murmurs  Pulmonary/Chest: clear to auscultation bilaterally  Abdomen: soft, non-tender, non-distended, normal bowel sounds, no masses.  RLQ drain tube seen  Extremities: no cyanosis, clubbing or edema, pulse   Skin: warm and dry, no rash or erythema  Head: normocephalic and atraumatic  Eyes: pupils equal, round, and reactive to light  Neck: supple and non-tender without mass, no thyromegaly   Musculoskeletal: normal range of motion, no joint swelling, deformity or tenderness  Neurological: alert, oriented X2,    Medications:   Medications:    potassium chloride  20 mEq Intravenous Once    meropenem  1 g Intravenous Q8H    enoxaparin  40 mg Subcutaneous Daily    amLODIPine  5 mg Oral Daily    sodium chloride flush  5 mL Intracatheter BID    lidocaine  1 patch Transdermal Daily    amantadine  100 mg Oral BID    aspirin  81 mg Oral Daily    escitalopram  10 mg Oral Nightly    rivastigmine  1.5 mg Oral BID    rosuvastatin  40 mg Oral QAM    bicalutamide  50 mg Oral Daily    sodium chloride flush  10 mL Intravenous 2 times per day    carbidopa-levodopa  2 tablet Oral 4x Daily    And    entacapone  200 mg Oral 4x Daily      Infusions:   PRN Meds: cloNIDine, 0.1 mg, TID PRN  potassium chloride, 10 mEq, PRN  sodium chloride flush, 10 mL, PRN  acetaminophen, 650 mg, Q6H PRN    Or  acetaminophen, 650 mg, Q6H PRN  polyethylene glycol, 17 g, Daily PRN  promethazine, 12.5 mg, Q6H PRN    Or  ondansetron, 4 mg, Q6H PRN  morphine, 2 mg, Q4H PRN            Pertinent New Labs & Imaging Studies     CBC with Differential:    Lab Results   Component Value Date    WBC 6.7 08/24/2020    RBC 3.80 08/24/2020    HGB 11.2 08/24/2020    HCT 36.5 08/24/2020     08/24/2020    MCV 96.1 08/24/2020    MCH 29.5 08/24/2020    MCHC 30.7 08/24/2020    RDW 14.2 08/24/2020    SEGSPCT 64.8 08/24/2020    LYMPHOPCT 24.1 08/24/2020    MONOPCT 7.4 08/24/2020    BASOPCT 0.9 08/24/2020    MONOSABS 0.5 08/24/2020    LYMPHSABS 1.6 08/24/2020    EOSABS 0.1 08/24/2020    BASOSABS 0.1 08/24/2020    DIFFTYPE AUTOMATED DIFFERENTIAL 08/24/2020     CMP:    Lab Results   Component Value Date     08/24/2020    K 3.9 08/24/2020     08/24/2020    CO2 33 08/24/2020    BUN 22 08/24/2020    CREATININE 1.1 08/24/2020    GFRAA >60 08/24/2020    LABGLOM >60 08/24/2020    GLUCOSE 101 08/24/2020    PROT 6.8 08/14/2020    LABALBU 3.1 08/14/2020    CALCIUM 9.3 08/24/2020    BILITOT 0.4 08/14/2020    ALKPHOS 98 08/14/2020    AST 11 08/14/2020    ALT <5 08/14/2020     Ct Abdomen Pelvis Wo Contrast Additional Contrast? None    Result Date: 8/16/2020  EXAMINATION: CT OF THE ABDOMEN AND PELVIS WITHOUT CONTRAST 8/14/2020 9:52 pm TECHNIQUE: CT of the abdomen and pelvis was performed without the administration of intravenous contrast. Multiplanar reformatted images are provided for review.  Dose modulation, iterative reconstruction, and/or weight based adjustment of the mA/kV was utilized to reduce the radiation dose to as low as reasonably achievable. COMPARISON: None. HISTORY: ORDERING SYSTEM PROVIDED HISTORY: nausea, vomiting, abdominal pain TECHNOLOGIST PROVIDED HISTORY: Reason for exam:->nausea, vomiting, abdominal pain Additional Contrast?->None Reason for Exam: nausea, vomiting, abdominal pain Acuity: Acute Type of Exam: Initial FINDINGS: Lower Chest: There is dependent consolidation in the lungs. Organs: There is mild right hydronephrosis. There is no obstructing stone. No acute abnormality of the liver, spleen, pancreas, adrenals, gallbladder, or left kidney. GI/Bowel: Bowel caliber is normal.  There is no evidence of active bowel inflammation. There is no evidence of acute appendicitis. Pelvis: In the right pelvic sidewall is a 56 x 67 x 66 mm (approximately) ill-defined mass adjacent to the iliac vessels. Borders of the mass are ill-defined. It medially displaces the right ureter. The mass appears somewhat elongated along the course of the iliac vein. The mass has an average density of 35 Hounsfield units. The urinary bladder is unremarkable. There are clips from prostatectomy. Peritoneum/Retroperitoneum: See above. No free air or free fluid. Bones/Soft Tissues: No acute osseous abnormality. Right pelvic mass. Differential diagnosis includes subacute hematoma or infiltrative malignancy. Mild right hydronephrosis due to compression of the ureter by the mass. Bibasilar pulmonary consolidation or atelectasis. Ct Head Wo Contrast    Result Date: 8/15/2020  EXAMINATION: CT OF THE HEAD WITHOUT CONTRAST 8/15/2020 6:09 pm TECHNIQUE: CT of the head was performed without the administration of intravenous contrast. Dose modulation, iterative reconstruction, and/or weight based adjustment of the mA/kV was utilized to reduce the radiation dose to as low as reasonably achievable.  COMPARISON: Brain MRI 04/09/2018 HISTORY: ORDERING SYSTEM PROVIDED HISTORY: lethargic TECHNOLOGIST PROVIDED HISTORY: Reason for exam:->lethargic Has a \"code stroke\" or \"stroke alert\" been called? ->No Reason for Exam: lethargic Acuity: Acute Type of Exam: Initial FINDINGS: Patient motion in some areas, partially limiting evaluation of those areas. BRAIN/VENTRICLES:  No masses nor acute intracranial hemorrhage. Intact gray/white matter differentiation without findings of acute ischemia. No mass effect nor midline shift. Patent basilar cisterns and foramen magnum. No hydrocephalus. Age-appropriate mild to moderate diffuse atrophy. Mild to moderate subcortical, deep, and periventricular white matter hypodensities likely due to chronic small vessel ischemia with additional pontine involvement. ORBITS:  Bilateral lens implants. Otherwise normal without acute abnormality. SINUSES:  Normally pneumatized and aerated. SOFT TISSUES/SKULL:  No acute soft tissue abnormality. Moderate atherosclerotic calcifications. No acute fracture. 1. No acute intracranial abnormality. 2. Chronic changes as above. Assessment and Plan:   Aftab Wright is a 79 y.o.  male  who presents with Pelvic mass    Pelvic abscess s/p IR assisted drainage  CT A/P with right pelvic mass with right hydronephrosis  CT urography with 5.5 x 4.7 cm collection in the right pelvis with midl peripheral enhancement extending into the right iliopsoas muscle as well as obstruction of the right ureter with hydroureteronephrosis. Also fluid filled thick walled structure extending from the cecum towards this collection raising suspicion for perforated appendicitis with associated abscess   Not sure about the origin ? Perforated appendicitis  Fluids appear to be purulent  Fluid cultures with GPC and Gram negative bacilli, Cytology pending  Blood cultures negative  Continue Meropenem   MRSA screening negative  General surgery recommendations appreciated  Urology recommendations appreciated  Discussed with Dr Blair James, plan to wait until the cytology report, if negative for malignancy can avoid surgery and can be discharged with oral antibiotics, needing 7 more days. Rt upper extremity DVT  US DVT of the right upper arm DVT, Updated POA Teaster, will add oral on discharge  For now will change Lovenox to therapeutic    Acute metabolic encephalopathy-Improved  Likely from sepsis    Acute respiratory failure with Hypoxia-Improved  Pneumonia  COVID 19 test negative  Continue abx as above  Given  lasix   X ray with worsening bilateral airspace dz  Pro BNP slightly elevated     SLIM - Improved  Likely from ATN with post obstructive uropathy  Hydronephrosis on kidney  Nephrology recommendations appreciated    Moderate PCM  Dietary following, appreciated recommendations    Prostate cancer   S/p Prostatectomy    Anemia of Chronic disorders    Dementia with Parkinson's Dz  Oswald Parish was evaluated today and a DME order was entered for a semi-electric hospital bed because he requires assistance for positioning needs not possible in an ordinary bed, complexity of body positioning needs.  Patient requires frequent and immediate positioning changes for relief of pain and care needs related to medical diagnoses.  Patient needs variability of bed height requirements to perform patient transfers and for eating, bathing, toileting, personal cares, ambulating, grooming and hygiene.  Current body Weight: 152 lb 8.9 oz (69.2 kg).  The need for this equipment was discussed with the patient and he understands and is in agreement. Oswald Parish requires a patient lift for the transfer between bed and a chair, wheelchair, commode and hospital bed.   Without the use of the lift, the patient would be bed confined.      US DVT of the right upper arm DVT, Updated POA Teaster, will add oral on dischage  Updated POA  Diet DIET GENERAL; Dysphagia Minced and Moist  Dietary Nutrition Supplements: Standard Oral Supplement   DVT Prophylaxis [x] Lovenox, []  Heparin, [] SCDs, [] Ambulation   GI Prophylaxis [] PPI,  [] H2 Blocker,  [] Carafate,  [x] Diet/Tube Feeds   Code Status Full Code   Disposition Needed  Placement   MDM [] Low, [x] Moderate,[]  High      Electronically signed by Donnetta Gowers, MD on 8/24/2020 at 2:00 PM

## 2020-08-24 NOTE — CONSULTS
Consult completed. Procedure/rationale explained to pt including benefits vs potential risks/complications; questions answered & consent obtained. #20ga [employer] Endurance MidLine initiated to RUE Cephalic Vein using sterile, UltraSound-guided technique without difficulty/complications. Positioning verified via UltraSound visualization of catheter within lumen of vessel; site returns blood briskly and flushes without any resistance or other abnormalities. Sterile dressing with SkinPrep, StatLock Securing Device, BioPatch, SwabCap, and Limb Precautions band applied. Pt tolerated well & denies other c/o or needs. During UltraSound examination of vasculature probable DVT noted in RUE anterior mid forearm - line placed in upper arm in another vessel. RN notified and will contact Physician for further orders.

## 2020-08-24 NOTE — PROGRESS NOTES
Nephrology Progress Note  8/24/2020 8:07 AM  Subjective:   Admit Date: 8/14/2020  PCP: Noah Hoffman MD     Interval History: stable serum creatinine with reasonable uop    Diet: Dietary Nutrition Supplements: Renal Oral Supplement  DIET GENERAL; Dysphagia Minced and Moist  Dietary Nutrition Supplements: Standard Oral Supplement    Data:   Scheduled Meds:   potassium chloride  20 mEq Intravenous Once    meropenem  1 g Intravenous Q8H    enoxaparin  40 mg Subcutaneous Daily    amLODIPine  5 mg Oral Daily    sodium chloride flush  5 mL Intracatheter BID    lidocaine  1 patch Transdermal Daily    amantadine  100 mg Oral BID    aspirin  81 mg Oral Daily    escitalopram  10 mg Oral Nightly    rivastigmine  1.5 mg Oral BID    rosuvastatin  40 mg Oral QAM    bicalutamide  50 mg Oral Daily    sodium chloride flush  10 mL Intravenous 2 times per day    carbidopa-levodopa  2 tablet Oral 4x Daily    And    entacapone  200 mg Oral 4x Daily     Continuous Infusions:    PRN Meds:cloNIDine, potassium chloride, sodium chloride flush, acetaminophen **OR** acetaminophen, polyethylene glycol, promethazine **OR** ondansetron, morphine  I/O last 3 completed shifts: In: 300 [P.O.:300]  Out: 1120 [Urine:1100; Drains:20]  No intake/output data recorded. Intake/Output Summary (Last 24 hours) at 8/24/2020 0807  Last data filed at 8/24/2020 0640  Gross per 24 hour   Intake 300 ml   Output 1120 ml   Net -820 ml     CBC:   Recent Labs     08/22/20  0531 08/23/20  0456 08/24/20  0448   WBC 7.2 6.9 6.7   HGB 10.7* 10.7* 11.2*    345 335     BMP:    Recent Labs     08/22/20  0531 08/23/20  0456 08/24/20  0448    138 140   K 3.5 3.3* 3.9    98* 101   CO2 29 31 33*   BUN 22 22 22   CREATININE 1.1 1.1 1.1   GLUCOSE 97 106* 101*     Hepatic:   No results for input(s): AST, ALT, ALB, BILITOT, ALKPHOS in the last 72 hours. Troponin: No results for input(s): TROPONINI in the last 72 hours.   BNP: No results for input(s): BNP in the last 72 hours. Lipids: No results for input(s): CHOL, HDL in the last 72 hours. Invalid input(s): LDLCALCU  ABGs: No results found for: PHART, Uyen Ahumada, HWB1JAP  INR:   Recent Labs     08/22/20  0531 08/23/20  0456 08/24/20  0448   INR 1.11 1.11 1.06     Renal Labs  Albumin:    Lab Results   Component Value Date    LABALBU 3.1 08/14/2020     Calcium:    Lab Results   Component Value Date    CALCIUM 9.3 08/24/2020     Phosphorus:  No results found for: PHOS  U/A:    Lab Results   Component Value Date    NITRU NEGATIVE 08/15/2020    COLORU YELLOW 08/15/2020    WBCUA 1 08/15/2020    RBCUA 6 08/15/2020    MUCUS RARE 08/15/2020    TRICHOMONAS NONE SEEN 08/15/2020    BACTERIA NEGATIVE 08/15/2020    CLARITYU CLEAR 08/15/2020    SPECGRAV 1.013 08/15/2020    UROBILINOGEN NORMAL 08/15/2020    BILIRUBINUR NEGATIVE 08/15/2020    BLOODU SMALL 08/15/2020    KETUA MODERATE 08/15/2020     ABG:  No results found for: PHART, YET6XID, PO2ART, FIY9TMB, BEART, THGBART, TST5ENT, L0KURMPJ  HgBA1c:  No results found for: LABA1C  Microalbumen/Creatinine ratio:  No components found for: RUCREAT    Objective:   Vitals: /71   Pulse 71   Temp 97.7 °F (36.5 °C) (Axillary)   Resp 16   Ht 6' 0.99\" (1.854 m)   Wt 156 lb (70.8 kg)   SpO2 90%   BMI 20.59 kg/m²      General appearance:  awake and verbally interactive; confused  HEENT: Head: normocephalic, atraumatic. Neck: supple, symmetrical, trachea midline  Cardiovascular: normal S1 and S2  Pulmonary: diminished lung sounds bilaterally   Abdomen:  soft / non-tender   Extremities: Trace edema to bilateral lower legs     Patient Active Problem List:     Weight loss, unintentional     History of colon polyps     Pelvic mass    Assessment and Plan:    IMP:  1 arf from atn/pra/hydro  2 right side mass fluid collection  3 hx prostate ca  4 hyponatremia  5 protein malnutrtion   6. Hypokalemia   7.  HTN     Plan     1.   -baseline serum creatinine with normal: Na / K

## 2020-08-25 ENCOUNTER — APPOINTMENT (OUTPATIENT)
Dept: GENERAL RADIOLOGY | Age: 80
DRG: 371 | End: 2020-08-25
Payer: MEDICARE

## 2020-08-25 ENCOUNTER — APPOINTMENT (OUTPATIENT)
Dept: ULTRASOUND IMAGING | Age: 80
DRG: 371 | End: 2020-08-25
Payer: MEDICARE

## 2020-08-25 LAB
ANION GAP SERPL CALCULATED.3IONS-SCNC: 5 MMOL/L (ref 4–16)
BASOPHILS ABSOLUTE: 0.1 K/CU MM
BASOPHILS RELATIVE PERCENT: 0.8 % (ref 0–1)
BUN BLDV-MCNC: 19 MG/DL (ref 6–23)
CALCIUM SERPL-MCNC: 9.1 MG/DL (ref 8.3–10.6)
CHLORIDE BLD-SCNC: 101 MMOL/L (ref 99–110)
CO2: 31 MMOL/L (ref 21–32)
CREAT SERPL-MCNC: 1.1 MG/DL (ref 0.9–1.3)
CULTURE: NORMAL
DIFFERENTIAL TYPE: ABNORMAL
EOSINOPHILS ABSOLUTE: 0.2 K/CU MM
EOSINOPHILS RELATIVE PERCENT: 2.5 % (ref 0–3)
GFR AFRICAN AMERICAN: >60 ML/MIN/1.73M2
GFR NON-AFRICAN AMERICAN: >60 ML/MIN/1.73M2
GLUCOSE BLD-MCNC: 93 MG/DL (ref 70–99)
GRAM SMEAR: NORMAL
HCT VFR BLD CALC: 35.3 % (ref 42–52)
HEMOGLOBIN: 10.7 GM/DL (ref 13.5–18)
IMMATURE NEUTROPHIL %: 1.4 % (ref 0–0.43)
LYMPHOCYTES ABSOLUTE: 1.5 K/CU MM
LYMPHOCYTES RELATIVE PERCENT: 23.2 % (ref 24–44)
Lab: NORMAL
MAGNESIUM: 2.1 MG/DL (ref 1.8–2.4)
MCH RBC QN AUTO: 29.4 PG (ref 27–31)
MCHC RBC AUTO-ENTMCNC: 30.3 % (ref 32–36)
MCV RBC AUTO: 97 FL (ref 78–100)
MONOCYTES ABSOLUTE: 0.5 K/CU MM
MONOCYTES RELATIVE PERCENT: 7.4 % (ref 0–4)
NUCLEATED RBC %: 0 %
PDW BLD-RTO: 14.3 % (ref 11.7–14.9)
PLATELET # BLD: 336 K/CU MM (ref 140–440)
PMV BLD AUTO: 9.3 FL (ref 7.5–11.1)
POTASSIUM SERPL-SCNC: 4.3 MMOL/L (ref 3.5–5.1)
RBC # BLD: 3.64 M/CU MM (ref 4.6–6.2)
SEGMENTED NEUTROPHILS ABSOLUTE COUNT: 4.2 K/CU MM
SEGMENTED NEUTROPHILS RELATIVE PERCENT: 64.7 % (ref 36–66)
SODIUM BLD-SCNC: 137 MMOL/L (ref 135–145)
SPECIMEN: NORMAL
TOTAL IMMATURE NEUTOROPHIL: 0.09 K/CU MM
TOTAL NUCLEATED RBC: 0 K/CU MM
WBC # BLD: 6.5 K/CU MM (ref 4–10.5)

## 2020-08-25 PROCEDURE — 2580000003 HC RX 258: Performed by: RADIOLOGY

## 2020-08-25 PROCEDURE — 6370000000 HC RX 637 (ALT 250 FOR IP): Performed by: INTERNAL MEDICINE

## 2020-08-25 PROCEDURE — 36415 COLL VENOUS BLD VENIPUNCTURE: CPT

## 2020-08-25 PROCEDURE — 94761 N-INVAS EAR/PLS OXIMETRY MLT: CPT

## 2020-08-25 PROCEDURE — 6370000000 HC RX 637 (ALT 250 FOR IP): Performed by: HOSPITALIST

## 2020-08-25 PROCEDURE — 80048 BASIC METABOLIC PNL TOTAL CA: CPT

## 2020-08-25 PROCEDURE — 76775 US EXAM ABDO BACK WALL LIM: CPT

## 2020-08-25 PROCEDURE — 1200000000 HC SEMI PRIVATE

## 2020-08-25 PROCEDURE — 85025 COMPLETE CBC W/AUTO DIFF WBC: CPT

## 2020-08-25 PROCEDURE — 6370000000 HC RX 637 (ALT 250 FOR IP): Performed by: NURSE PRACTITIONER

## 2020-08-25 PROCEDURE — 2700000000 HC OXYGEN THERAPY PER DAY

## 2020-08-25 PROCEDURE — 2580000003 HC RX 258: Performed by: INTERNAL MEDICINE

## 2020-08-25 PROCEDURE — 71046 X-RAY EXAM CHEST 2 VIEWS: CPT

## 2020-08-25 PROCEDURE — 83735 ASSAY OF MAGNESIUM: CPT

## 2020-08-25 PROCEDURE — 99232 SBSQ HOSP IP/OBS MODERATE 35: CPT | Performed by: SURGERY

## 2020-08-25 PROCEDURE — 6360000002 HC RX W HCPCS: Performed by: INTERNAL MEDICINE

## 2020-08-25 RX ADMIN — MEROPENEM 1 G: 1 INJECTION, POWDER, FOR SOLUTION INTRAVENOUS at 03:16

## 2020-08-25 RX ADMIN — RIVASTIGMINE TARTRATE 1.5 MG: 1.5 CAPSULE ORAL at 20:48

## 2020-08-25 RX ADMIN — CARBIDOPA AND LEVODOPA 2 TABLET: 25; 100 TABLET ORAL at 12:49

## 2020-08-25 RX ADMIN — SODIUM CHLORIDE, PRESERVATIVE FREE 10 ML: 5 INJECTION INTRAVENOUS at 09:19

## 2020-08-25 RX ADMIN — ENTACAPONE 200 MG: 200 TABLET, FILM COATED ORAL at 12:49

## 2020-08-25 RX ADMIN — ASPIRIN 81 MG CHEWABLE TABLET 81 MG: 81 TABLET CHEWABLE at 09:18

## 2020-08-25 RX ADMIN — SODIUM CHLORIDE, PRESERVATIVE FREE 5 ML: 5 INJECTION INTRAVENOUS at 20:49

## 2020-08-25 RX ADMIN — ENOXAPARIN SODIUM 70 MG: 80 INJECTION SUBCUTANEOUS at 10:30

## 2020-08-25 RX ADMIN — ENOXAPARIN SODIUM 70 MG: 80 INJECTION SUBCUTANEOUS at 20:49

## 2020-08-25 RX ADMIN — CARBIDOPA AND LEVODOPA 2 TABLET: 25; 100 TABLET ORAL at 20:48

## 2020-08-25 RX ADMIN — CARBIDOPA AND LEVODOPA 2 TABLET: 25; 100 TABLET ORAL at 17:21

## 2020-08-25 RX ADMIN — MEROPENEM 1 G: 1 INJECTION, POWDER, FOR SOLUTION INTRAVENOUS at 18:34

## 2020-08-25 RX ADMIN — AMANTADINE HYDROCHLORIDE 100 MG: 100 CAPSULE, LIQUID FILLED ORAL at 20:48

## 2020-08-25 RX ADMIN — SODIUM CHLORIDE, PRESERVATIVE FREE 10 ML: 5 INJECTION INTRAVENOUS at 18:34

## 2020-08-25 RX ADMIN — CLONIDINE HYDROCHLORIDE 0.1 MG: 0.1 TABLET ORAL at 09:18

## 2020-08-25 RX ADMIN — ENTACAPONE 200 MG: 200 TABLET, FILM COATED ORAL at 17:21

## 2020-08-25 RX ADMIN — SODIUM CHLORIDE, PRESERVATIVE FREE 10 ML: 5 INJECTION INTRAVENOUS at 20:48

## 2020-08-25 RX ADMIN — SODIUM CHLORIDE, PRESERVATIVE FREE 10 ML: 5 INJECTION INTRAVENOUS at 10:31

## 2020-08-25 RX ADMIN — ESCITALOPRAM OXALATE 10 MG: 10 TABLET ORAL at 20:48

## 2020-08-25 RX ADMIN — CARBIDOPA AND LEVODOPA 2 TABLET: 25; 100 TABLET ORAL at 09:18

## 2020-08-25 RX ADMIN — RIVASTIGMINE TARTRATE 1.5 MG: 1.5 CAPSULE ORAL at 09:18

## 2020-08-25 RX ADMIN — MEROPENEM 1 G: 1 INJECTION, POWDER, FOR SOLUTION INTRAVENOUS at 10:30

## 2020-08-25 RX ADMIN — ENTACAPONE 200 MG: 200 TABLET, FILM COATED ORAL at 20:48

## 2020-08-25 RX ADMIN — ROSUVASTATIN 40 MG: 40 TABLET, FILM COATED ORAL at 09:18

## 2020-08-25 RX ADMIN — AMLODIPINE BESYLATE 5 MG: 5 TABLET ORAL at 09:18

## 2020-08-25 RX ADMIN — BICALUTAMIDE 50 MG: 50 TABLET ORAL at 09:19

## 2020-08-25 RX ADMIN — ENTACAPONE 200 MG: 200 TABLET, FILM COATED ORAL at 09:19

## 2020-08-25 ASSESSMENT — PAIN SCALES - WONG BAKER
WONGBAKER_NUMERICALRESPONSE: 0

## 2020-08-25 ASSESSMENT — PAIN DESCRIPTION - PAIN TYPE: TYPE: ACUTE PAIN

## 2020-08-25 ASSESSMENT — PAIN SCALES - GENERAL
PAINLEVEL_OUTOF10: 0

## 2020-08-25 NOTE — PROGRESS NOTES
Per physician orders, nasal cannula removed in attempt to wean patient.      Will check sp02 and continue to monitor

## 2020-08-25 NOTE — PROGRESS NOTES
Nephrology Progress Note  8/25/2020 9:26 AM  Subjective:   Admit Date: 8/14/2020  PCP: Elly Zarate MD     Interval History: Patient is voicing no pressing concerns during our visit  -stable serum creatinine / poor appetite     Diet: DIET GENERAL; Dysphagia Minced and Moist  Dietary Nutrition Supplements: Standard Oral Supplement    Data:   Scheduled Meds:   enoxaparin  1 mg/kg Subcutaneous BID    potassium chloride  20 mEq Intravenous Once    meropenem  1 g Intravenous Q8H    amLODIPine  5 mg Oral Daily    sodium chloride flush  5 mL Intracatheter BID    lidocaine  1 patch Transdermal Daily    amantadine  100 mg Oral BID    aspirin  81 mg Oral Daily    escitalopram  10 mg Oral Nightly    rivastigmine  1.5 mg Oral BID    rosuvastatin  40 mg Oral QAM    bicalutamide  50 mg Oral Daily    sodium chloride flush  10 mL Intravenous 2 times per day    carbidopa-levodopa  2 tablet Oral 4x Daily    And    entacapone  200 mg Oral 4x Daily     Continuous Infusions:    PRN Meds:cloNIDine, potassium chloride, sodium chloride flush, acetaminophen **OR** acetaminophen, polyethylene glycol, promethazine **OR** ondansetron, morphine  I/O last 3 completed shifts: In: 120 [P.O.:120]  Out: 650 [Urine:650]  I/O this shift:  In: 10 [I.V.:10]  Out: -     Intake/Output Summary (Last 24 hours) at 8/25/2020 0926  Last data filed at 8/25/2020 0918  Gross per 24 hour   Intake 130 ml   Output 650 ml   Net -520 ml     CBC:   Recent Labs     08/23/20  0456 08/24/20  0448 08/25/20  0614   WBC 6.9 6.7 6.5   HGB 10.7* 11.2* 10.7*    335 336     BMP:    Recent Labs     08/23/20  0456 08/24/20  0448 08/25/20  0614    140 137   K 3.3* 3.9 4.3   CL 98* 101 101   CO2 31 33* 31   BUN 22 22 19   CREATININE 1.1 1.1 1.1   GLUCOSE 106* 101* 93     Hepatic:   No results for input(s): AST, ALT, ALB, BILITOT, ALKPHOS in the last 72 hours. Troponin: No results for input(s): TROPONINI in the last 72 hours.   BNP: No results for input(s): BNP in the last 72 hours. Lipids: No results for input(s): CHOL, HDL in the last 72 hours. Invalid input(s): LDLCALCU  ABGs: No results found for: PHART, PO2ART, OVI0UNF  INR:   Recent Labs     08/23/20  0456 08/24/20  0448   INR 1.11 1.06     Renal Labs  Albumin:    Lab Results   Component Value Date    LABALBU 3.1 08/14/2020     Calcium:    Lab Results   Component Value Date    CALCIUM 9.1 08/25/2020     Phosphorus:  No results found for: PHOS  U/A:    Lab Results   Component Value Date    NITRU NEGATIVE 08/15/2020    COLORU YELLOW 08/15/2020    WBCUA 1 08/15/2020    RBCUA 6 08/15/2020    MUCUS RARE 08/15/2020    TRICHOMONAS NONE SEEN 08/15/2020    BACTERIA NEGATIVE 08/15/2020    CLARITYU CLEAR 08/15/2020    SPECGRAV 1.013 08/15/2020    UROBILINOGEN NORMAL 08/15/2020    BILIRUBINUR NEGATIVE 08/15/2020    BLOODU SMALL 08/15/2020    KETUA MODERATE 08/15/2020     ABG:  No results found for: PHART, HIH7XYO, PO2ART, LVL9NNK, BEART, THGBART, EDS3FTW, O1IALIOL  HgBA1c:  No results found for: LABA1C  Microalbumen/Creatinine ratio:  No components found for: RUCREAT    Objective:   Vitals: /60   Pulse 64   Temp 97.9 °F (36.6 °C) (Oral)   Resp 16   Ht 6' 0.99\" (1.854 m)   Wt 156 lb (70.8 kg)   SpO2 100%   BMI 20.59 kg/m²      General appearance:  awake and verbally interactive; confused  HEENT: Head: normocephalic, atraumatic. Neck: supple, symmetrical, trachea midline  Cardiovascular: normal S1 and S2  Pulmonary: diminished lung sounds bilaterally   Abdomen:  soft / non-tender   Extremities: Trace edema to bilateral lower legs     -Right sided pelvic drain intact     Patient Active Problem List:     Weight loss, unintentional     History of colon polyps     Pelvic mass    Assessment and Plan:    IMP:  1 arf from atn/pra/hydro  2 right side mass fluid collection  -right sided drain placement     3 hx prostate ca  4 hyponatremia  5 protein malnutrtion   6. Hypokalemia   7. HTN     Plan     1. -baseline serum creatinine with normal: Na / K / CO2   -uop: 650 ml in the last 24 hours via jones   2   -followed by Dr. Natividad Chris   -awaiting pathology results   3.   -on Casodex; followed by Urology   4   -latest lab results reflect a normal serum sodium level  5.   -I encouraged patient in regard to oral intake; minimal appetite  -on protein supplementation   6.   -latest lab results reflects normal serum potassium   7. -BP trend: systolic BP's have recently been 113 - 123  -on amlodipine with holding parameters     Electronically signed by SURENDRA Montesinos - [facility]          Nephrology Attending Progress Note  8/25/2020 11:24 AM  Subjective:   Admit Date: 8/14/2020  PCP: Apolonia Mcwilliams MD    Interval History: I have personally performed face to face diagnostic evaluation on this patient. I have personally reviewed pertinent labs and imaging and agree with the care plan above. My additional findings are as follows:   Pt weak and arousable today    Objective:   Vitals: /60   Pulse 64   Temp 97.9 °F (36.6 °C) (Oral)   Resp 16   Ht 6' 0.99\" (1.854 m)   Wt 156 lb (70.8 kg)   SpO2 100%   BMI 20.59 kg/m²   Weak awake  Soft nt  No edema    Assessment and Plan:  IMP:  As stated above    Plan     1 bp stable  2 abd stable and improved infection  3 renal improved to baseline  4 K stable  Will mointor           Electronically signed by Josh Jordan MD on 8/25/2020 at 11:24 AM

## 2020-08-25 NOTE — PROGRESS NOTES
[facility] [address]   Progress Note  [facility] 0 1 2      Date: 2020   Patient: Dustin Addison   : 1940   DOA: 2020   MRN: [id number]   ROOM#: [id number]     Admit Date: 2020     Collaborating Urologist on Call at time of admission: Dr. Diego Huntley pain  Reason for Consult: Pelvic mass, rt hydronephrosis    Subjective:     Pain: none, no nausea and no vomiting,   Bowel Movement/Flatus:   Yes  Voiding: Renee catheter in place draining clear yellow urine      Pt resting comfortably in bed, denies any pain.     Objective:    Vitals:    /60   Pulse 64   Temp 97.9 °F (36.6 °C) (Oral)   Resp 16   Ht 6' 0.99\" (1.854 m)   Wt 156 lb (70.8 kg)   SpO2 100%   BMI 20.59 kg/m²    Temp  Av.7 °F (36.5 °C)  Min: 97.5 °F (36.4 °C)  Max: 97.9 °F (36.6 °C)       Intake/Output Summary (Last 24 hours) at 2020 1043  Last data filed at 2020 1030  Gross per 24 hour   Intake 140 ml   Output 650 ml   Net -510 ml       Physical Exam:   General appearance: alert, appears stated age, cooperative, fatigued and no distress  Head: Normocephalic, without obvious abnormality, atraumatic  Back: No CVA tenderness  Abdomen: Soft, non-distended, mild TTP in RLQ  : Renee catheter in place draining clear yellow urine    Labs:   WBC:    Lab Results   Component Value Date    WBC 6.5 2020      Hemoglobin/Hematocrit:    Lab Results   Component Value Date    HGB 10.7 2020    HCT 35.3 2020      BMP:   Lab Results   Component Value Date     2020    K 4.3 2020     2020    CO2 31 2020    BUN 19 2020    LABALBU 3.1 2020    CREATININE 1.1 2020    CALCIUM 9.1 2020    GFRAA >60 2020    LABGLOM >60 2020      PT/INR:    Lab Results   Component Value Date    PROTIME 12.8 2020    INR 1.06 2020      PTT:    Lab Results   Component Value Date    APTT 53.5 2020       Imaging:  Ct Abdomen Pelvis Wo Contrast Additional Contrast? None    Result Date: 8/16/2020  EXAMINATION: CT OF THE ABDOMEN AND PELVIS WITHOUT CONTRAST 8/14/2020 9:52 pm TECHNIQUE: CT of the abdomen and pelvis was performed without the administration of intravenous contrast. Multiplanar reformatted images are provided for review. Dose modulation, iterative reconstruction, and/or weight based adjustment of the mA/kV was utilized to reduce the radiation dose to as low as reasonably achievable. COMPARISON: None. HISTORY: ORDERING SYSTEM PROVIDED HISTORY: nausea, vomiting, abdominal pain TECHNOLOGIST PROVIDED HISTORY: Reason for exam:->nausea, vomiting, abdominal pain Additional Contrast?->None Reason for Exam: nausea, vomiting, abdominal pain Acuity: Acute Type of Exam: Initial FINDINGS: Lower Chest: There is dependent consolidation in the lungs. Organs: There is mild right hydronephrosis. There is no obstructing stone. No acute abnormality of the liver, spleen, pancreas, adrenals, gallbladder, or left kidney. GI/Bowel: Bowel caliber is normal.  There is no evidence of active bowel inflammation. There is no evidence of acute appendicitis. Pelvis: In the right pelvic sidewall is a 56 x 67 x 66 mm (approximately) ill-defined mass adjacent to the iliac vessels. Borders of the mass are ill-defined. It medially displaces the right ureter. The mass appears somewhat elongated along the course of the iliac vein. The mass has an average density of 35 Hounsfield units. The urinary bladder is unremarkable. There are clips from prostatectomy. Peritoneum/Retroperitoneum: See above. No free air or free fluid. Bones/Soft Tissues: No acute osseous abnormality. Right pelvic mass. Differential diagnosis includes subacute hematoma or infiltrative malignancy. Mild right hydronephrosis due to compression of the ureter by the mass. Bibasilar pulmonary consolidation or atelectasis.      Ct Head Wo Contrast    Result Date: 8/15/2020  EXAMINATION: CT OF THE HEAD WITHOUT CONTRAST 8/15/2020 6:09 pm TECHNIQUE: CT of the head was performed without the administration of intravenous contrast. Dose modulation, iterative reconstruction, and/or weight based adjustment of the mA/kV was utilized to reduce the radiation dose to as low as reasonably achievable. COMPARISON: Brain MRI 04/09/2018 HISTORY: ORDERING SYSTEM PROVIDED HISTORY: lethargic TECHNOLOGIST PROVIDED HISTORY: Reason for exam:->lethargic Has a \"code stroke\" or \"stroke alert\" been called? ->No Reason for Exam: lethargic Acuity: Acute Type of Exam: Initial FINDINGS: Patient motion in some areas, partially limiting evaluation of those areas. BRAIN/VENTRICLES:  No masses nor acute intracranial hemorrhage. Intact gray/white matter differentiation without findings of acute ischemia. No mass effect nor midline shift. Patent basilar cisterns and foramen magnum. No hydrocephalus. Age-appropriate mild to moderate diffuse atrophy. Mild to moderate subcortical, deep, and periventricular white matter hypodensities likely due to chronic small vessel ischemia with additional pontine involvement. ORBITS:  Bilateral lens implants. Otherwise normal without acute abnormality. SINUSES:  Normally pneumatized and aerated. SOFT TISSUES/SKULL:  No acute soft tissue abnormality. Moderate atherosclerotic calcifications. No acute fracture. 1. No acute intracranial abnormality. 2. Chronic changes as above. Xr Chest Portable    Result Date: 8/21/2020  EXAMINATION: ONE XRAY VIEW OF THE CHEST 8/21/2020 12:02 pm COMPARISON: 08/16/2020 HISTORY: ORDERING SYSTEM PROVIDED HISTORY: SOb, requiring O2 support TECHNOLOGIST PROVIDED HISTORY: Reason for exam:->SOb, requiring O2 support Reason for Exam: SOb, requiring O2 support FINDINGS: The heart and mediastinal structures are stable. Worsening bilateral airspace disease is noted consistent with pneumonia. No significant pleural effusion is seen. Worsening bilateral airspace disease. Xr Chest 1 View    Result Date: 8/16/2020  EXAMINATION: ONE XRAY VIEW OF THE CHEST 8/16/2020 10:07 am COMPARISON: CT abdomen/pelvis 08/14/2020. HISTORY: ORDERING SYSTEM PROVIDED HISTORY: hypoxia and fever TECHNOLOGIST PROVIDED HISTORY: Reason for exam:->hypoxia and fever Acuity: Acute Type of Exam: Subsequent/Follow-up FINDINGS: Single view provided. Moderate rotation. Mediastinal and cardiac silhouettes are normal.  The lung volumes are normal.  There are bilateral symmetric basilar predominant ground-glass densities with mild interstitial opacities. No lobar consolidation. No large effusion or pneumothorax. No free subdiaphragmatic air. Bilateral symmetric interstitial opacities more prominent lung bases. This could represent possible edema versus atypical pneumonia. Vl Dup Upper Extremity Venous Right    Result Date: 8/24/2020  EXAMINATION: DUPLEX ULTRASOUND OF THE RIGHT UPPER EXTREMITY FOR DVT, 8/24/2020 2:52 pm TECHNIQUE: Duplex ultrasound and Doppler images were obtained of the deep venous structures of the upper extremity. COMPARISON: None. HISTORY: ORDERING SYSTEM PROVIDED HISTORY: CLOT TECHNOLOGIST PROVIDED HISTORY: Reason For Exam Priority: STAT To r/o DVT Comments:  During UltraSound examination of vasculature probable DVT noted in RUE anterior mid forearm - line placed in upper arm in another vessel. RN notified and will contact Physician for further orders. Reason for exam:->CLOT Acuity: Acute Type of Exam: Initial FINDINGS: Nonocclusive echogenic thrombus is seen in the right axillary, brachial, basilic, and radial veins. No color Doppler flow is appreciated in the ulnar vein. The visualized portions of the internal jugular and subclavian veins appear patent. Nonocclusive deep vein thrombosis of the right axillary, brachial, and radial veins. Occlusive thrombosis of the ulnar vein.  Nonocclusive superficial vein thrombosis of the basilic There is a tubular thick-walled fluid-filled structure which extends from the cecum (best seen on series 604 image 70) felt to be the appendix. The tip of the appendix appears to communicate with a complex 5.5 x 4.7 cm collection which demonstrates mild peripheral enhancement. This abuts and likely extends into the right iliopsoas muscle and likely abuts and or encases the right external iliac vessels. This collection also obstructs the right ureter. Pelvis: Status post prostatectomy. No pathologic enlarged adenopathy is otherwise identified. No other significant free fluid. Peritoneum/Retroperitoneum: The aorta is normal in caliber with moderate to severe atherosclerosis. No retroperitoneal or mesenteric adenopathy. No definite ascites or drainable fluid collection. Bones/Soft Tissues: No acute findings in the bones or soft tissues. Diffuse osteopenia. 1. There is a complex 5.5 x 4.7 cm collection in the right pelvis with surrounding mild peripheral enhancement extends into the right iliopsoas muscle and likely abuts and encases the right external iliac vessels. There is also obstruction of the right ureter resulting in mild hydroureteronephrosis. There is a fluid-filled thick-walled structure extending from the cecum towards this collection raising suspicion for perforated appendicitis with associated phlegmon/abscess. Less likely considerations include hematoma or malignancy given history of prostate cancer. 2. Status post prostatectomy. Otherwise no obvious urinary tract filling defect or calculus given limitations due to motion artifact. Results were called by Dr. Prachi Guzman. García Harkins MD to Harley Lemon on 8/18/2020 at 14:15.      Ct Limited Follow Up    Result Date: 8/19/2020  EXAMINATION: CT LIMITED/FOLLOW UP 8/19/2020 1:17 pm TECHNIQUE: Dose modulation, iterative reconstruction, and/or weight based adjustment of the mA/kV was utilized to reduce the radiation dose to as low as reasonably achievable. A preprocedural CT was performed to help determine the needle course. Of note, the patient did exhibit significant confusion and was not able to remain still throughout the procedure. 1% subcutaneous lidocaine was instilled at the expected needle entry site. Next, an [address] 24 needle was placed superficially in the right hip. The patient was again asked to remain still but was unable to. The patient was given moderate sedation, which did not limit his movements. At this point, I did not feel that it was safe to proceed with simple moderate sedation due to the vascular structures in the region of the needle path. Next, Dr. Mark Anthony Gordillo was contacted regarding the clinical scenario and recommended contacting anesthesia for assistance. Anesthesia was unavailable to assist with today's procedure and so it was rescheduled for tomorrow and Dr. Mark Anthony Gordillo was informed of this. The needle was removed and the patient suffered no immediate complications. EBL: Less than 5 cc COMPARISON: CT urogram 08/18/2020 HISTORY: ORDERING SYSTEM PROVIDED HISTORY: pelvic mass TECHNOLOGIST PROVIDED HISTORY: Reason for exam:->pelvic mass Reason for Exam: abcess drainage Patient has a lesion in the right lower quadrant, which is likely a multiloculated abscess. A mass is also a consideration, but less likely. The patient is currently confused and so the patient's wife was consented for the procedure. Plan to place a drain in the collection and/or aspiration. ASA 3 Mallampati 2 FINDINGS: No significant change in the right lower quadrant approximately 6.3 x 4.4 cm mixed density collection anterior to the right iliac vessels. Unsuccessful CT-guided drain attempt. RECOMMENDATIONS: Will re-attempt the procedure with the assistance of anesthesia. Us Retroperitoneal Limited    Result Date: 8/25/2020  EXAMINATION: ULTRASOUND OF THE KIDNEYS 8/25/2020 9:28 am COMPARISON: None.  HISTORY: ORDERING SYSTEM PROVIDED HISTORY: hydronephrosis TECHNOLOGIST PROVIDED HISTORY: Reason for exam:->hydronephrosis Reason for Exam: reevaluate mild hydro Acuity: Acute Type of Exam: Subsequent/Follow-up FINDINGS: The right kidney measures 9.4 cm in length and the left kidney measures 10.3 cm in length. Kidneys demonstrate normal cortical echogenicity. No hydronephrosis or intrarenal stones. There is a solitary cyst in the mid right kidney measuring 1.7 cm in diameter. There is a solitary cyst involving the mid left kidney measuring 2.2 cm in diameter. No other focal lesions. Unremarkable ultrasound of the kidneys. Specifically, no evidence of hydronephrosis on either side. Ct Drainage Hematoma/seroma/fluid Collection    Result Date: 8/20/2020  PROCEDURE: CT DRAINAGE With anesthesia 8/20/2020 HISTORY: ORDERING SYSTEM PROVIDED HISTORY: ABCESS TECHNOLOGIST PROVIDED HISTORY: Reason for exam:->ABCESS Reason for Exam: RLQ abcess Previously attempted right lower quadrant collection drainage was unsuccessful due to difficulty with sedating patient. Patient return for aspiration/drainage attempt with the assistance of anesthesia. TECHNIQUE: Dose modulation, iterative reconstruction, and/or weight based adjustment of the mA/kV was utilized to reduce the radiation dose to as low as reasonably achievable. CONTRAST: None SEDATION: Per anesthesia. See separate note. FLUOROSCOPY DOSE AND TYPE OR TIME AND EXPOSURES: DLP: 9048.09 mGy-cm DESCRIPTION OF PROCEDURE: Informed consent was obtained after a detailed explanation of the procedure including risks, benefits, and alternatives. Universal protocol was observed. The right hip was prepped and draped using standard aseptic technique. The expected needle course was anesthetized using 1% subcutaneous lidocaine. Next, under intermittent CT guidance, an 18 gauge Chiba needle was carefully advanced to the expected region of the right lower quadrant collection using anatomic landmarks.  Initially, fluid could not be aspirated via the needle. A Abdullahi wire was passed and was noted to coil in the expected area of the collection. Next, the tract was sequentially dilated to accommodate an 8 [place]arda drain. A total of approximately 15 cc of thick bloody/purulent was aspirated and sent for cytology, gram stain and culture. The drain was secured to the skin with suture. Drain was attached to a suction bulb. The patient tolerated the procedure well and there were no immediate complications. EBL: Less than 5 cc FINDINGS: 1.  8 Prydeinig drain seen within the right lower quadrant, which yielded purulent material. 2.  No free air identified within the visualized abdomen. 3.  Previously noted thickening of the appendix and the size of the collection are not significantly changed, though evaluation is somewhat limited without IV contrast.     1.  Technically successful placement of an 8 Prydeinig drain in the right lower quadrant collection. 2.  Aspiration of purulent material supports the diagnosis of ruptured appendicitis with abscess\phlegmon, though continued attention on follow-up is recommended. RECOMMENDATIONS: 1. Drain to bulb drainage. 2.  Flush right lower quadrant drain with 2-5 cc normal saline b.i.d. 3.  Record daily drain output. Assessment & Plan:      Dustin Addison is a 79y.o. year old male admitted 8/14/2020 for pelvic mass. H/o prostate cancer s/p RALP 1/2010 at [facility]. Pathology report = Cecile 3+4 with negative margins. Pt on Eligard. PSA: 2 3/19, 2.9 6/19     1) Pelvic Mass              8/20/20 CT Urogram: There is a complex 5.5 x 4.7 cm collection in the right pelvis with surrounding mild peripheral enhancement extends into the right iliopsoas muscle and likely abuts and encases the right external iliac vessels. There is also obstruction of the right ureter resulting in mild hydroureteronephrosis.   There is a fluid-filled thick-walled structure extending from the cecum towards this collection raising suspicion for perforated appendicitis with associated phlegmon/abscess. Less likely considerations include hematoma or malignancy given history of prostate cancer   8/14/20 CT a/p: Right pelvic mass measuring 56 x 67 x 66 mm, ill-defined mass adjacent to the iliac vessels that medially displaces the right ureter. The mass has an average density of 35 Hounsfield units. Differential diagnosis includes subacute hematoma or infiltrative malignancy. Mild right hydronephrosis due to compression of the ureter by the mass.              8/20/20 s/p percutaneous bx performed by IR              Pathology results pending  2) Mild Right Hydronephrosis: Resolved.              8/25/20 RUSLAN: Unremarkable ultrasound of the kidneys. Specifically, no evidence of hydronephrosis on either side.              Cr 1.1  3) Prostate Cancer: s/p RALP 1/2010              On [facility]              8/15/20 PSA 2.76    Pt stable from a  standpoint. Will sign off, please call with any questions. Pt to follow up with Dr. Nehemiah Schrader within 1 month. Patient seen and examined, chart reviewed.      Electronically signed by Fei Le PA-C on 8/25/2020 at 10:43 AM

## 2020-08-25 NOTE — CARE COORDINATION
Spoke with Fernando Gutierrez at [facility] and sent updated clinical/ she will start precert.

## 2020-08-25 NOTE — PROGRESS NOTES
GENERAL SURGERY PROGRESS NOTE    CC/HPI:           Patient feels better and eating OK. Vitals:    08/24/20 1022 08/24/20 1724 08/24/20 2026 08/25/20 0824   BP: 133/68 114/68 123/68 113/60   Pulse: 67 65 63 64   Resp: 18 20 16 16   Temp: 98.1 °F (36.7 °C) 97.5 °F (36.4 °C) 97.8 °F (36.6 °C) 97.9 °F (36.6 °C)   TempSrc: Oral Oral Oral Oral   SpO2: 100%  100% 100%   Weight:       Height:         I/O last 3 completed shifts:   In: 120 [P.O.:120]  Out: 650 [Urine:650]  I/O this shift:  In: 20 [I.V.:20]  Out: -     DIET GENERAL; Dysphagia Minced and Moist  Dietary Nutrition Supplements: Standard Oral Supplement    Recent Results (from the past 48 hour(s))   Protime-INR    Collection Time: 08/24/20  4:48 AM   Result Value Ref Range    Protime 12.8 11.7 - 14.5 SECONDS    INR 1.06 INDEX   APTT    Collection Time: 08/24/20  4:48 AM   Result Value Ref Range    aPTT 53.5 (H) 25.1 - 37.1 SECONDS   Fibrinogen    Collection Time: 08/24/20  4:48 AM   Result Value Ref Range    Fibrinogen 513 (H) 196.9 - 442.1 MG/DL   D-Dimer, Quantitative    Collection Time: 08/24/20  4:48 AM   Result Value Ref Range    D-Dimer, Quant 876 (H) <230 NG/mL(DDU)   CBC Auto Differential    Collection Time: 08/24/20  4:48 AM   Result Value Ref Range    WBC 6.7 4.0 - 10.5 K/CU MM    RBC 3.80 (L) 4.6 - 6.2 M/CU MM    Hemoglobin 11.2 (L) 13.5 - 18.0 GM/DL    Hematocrit 36.5 (L) 42 - 52 %    MCV 96.1 78 - 100 FL    MCH 29.5 27 - 31 PG    MCHC 30.7 (L) 32.0 - 36.0 %    RDW 14.2 11.7 - 14.9 %    Platelets 927 953 - 407 K/CU MM    MPV 9.2 7.5 - 11.1 FL    Differential Type AUTOMATED DIFFERENTIAL     Segs Relative 64.8 36 - 66 %    Lymphocytes % 24.1 24 - 44 %    Monocytes % 7.4 (H) 0 - 4 %    Eosinophils % 1.6 0 - 3 %    Basophils % 0.9 0 - 1 %    Segs Absolute 4.4 K/CU MM    Lymphocytes Absolute 1.6 K/CU MM    Monocytes Absolute 0.5 K/CU MM    Eosinophils Absolute 0.1 K/CU MM    Basophils Absolute 0.1 K/CU MM    Nucleated RBC % 0.0 %    Total Nucleated RBC 0.0 K/CU MM    Total Immature Neutrophil 0.08 K/CU MM    Immature Neutrophil % 1.2 (H) 0 - 0.43 %   Basic metabolic panel    Collection Time: 08/24/20  4:48 AM   Result Value Ref Range    Sodium 140 135 - 145 MMOL/L    Potassium 3.9 3.5 - 5.1 MMOL/L    Chloride 101 99 - 110 mMol/L    CO2 33 (H) 21 - 32 MMOL/L    Anion Gap 6 4 - 16    BUN 22 6 - 23 MG/DL    CREATININE 1.1 0.9 - 1.3 MG/DL    Glucose 101 (H) 70 - 99 MG/DL    Calcium 9.3 8.3 - 10.6 MG/DL    GFR Non-African American >60 >60 mL/min/1.73m2    GFR African American >60 >60 mL/min/1.73m2   Magnesium    Collection Time: 08/24/20  4:48 AM   Result Value Ref Range    Magnesium 2.1 1.8 - 2.4 mg/dl   C-Reactive Protein    Collection Time: 08/24/20  4:48 AM   Result Value Ref Range    CRP, High Sensitivity 70.8 mg/L   Procalcitonin    Collection Time: 08/24/20  4:48 AM   Result Value Ref Range    Procalcitonin 0.121    CBC Auto Differential    Collection Time: 08/25/20  6:14 AM   Result Value Ref Range    WBC 6.5 4.0 - 10.5 K/CU MM    RBC 3.64 (L) 4.6 - 6.2 M/CU MM    Hemoglobin 10.7 (L) 13.5 - 18.0 GM/DL    Hematocrit 35.3 (L) 42 - 52 %    MCV 97.0 78 - 100 FL    MCH 29.4 27 - 31 PG    MCHC 30.3 (L) 32.0 - 36.0 %    RDW 14.3 11.7 - 14.9 %    Platelets 796 395 - 509 K/CU MM    MPV 9.3 7.5 - 11.1 FL    Differential Type AUTOMATED DIFFERENTIAL     Segs Relative 64.7 36 - 66 %    Lymphocytes % 23.2 (L) 24 - 44 %    Monocytes % 7.4 (H) 0 - 4 %    Eosinophils % 2.5 0 - 3 %    Basophils % 0.8 0 - 1 %    Segs Absolute 4.2 K/CU MM    Lymphocytes Absolute 1.5 K/CU MM    Monocytes Absolute 0.5 K/CU MM    Eosinophils Absolute 0.2 K/CU MM    Basophils Absolute 0.1 K/CU MM    Nucleated RBC % 0.0 %    Total Nucleated RBC 0.0 K/CU MM    Total Immature Neutrophil 0.09 K/CU MM    Immature Neutrophil % 1.4 (H) 0 - 0.43 %   Basic metabolic panel    Collection Time: 08/25/20  6:14 AM   Result Value Ref Range    Sodium 137 135 - 145 MMOL/L    Potassium 4.3 3.5 - 5.1 MMOL/L    Chloride 101 99 - 110 mMol/L    CO2 31 21 - 32 MMOL/L    Anion Gap 5 4 - 16    BUN 19 6 - 23 MG/DL    CREATININE 1.1 0.9 - 1.3 MG/DL    Glucose 93 70 - 99 MG/DL    Calcium 9.1 8.3 - 10.6 MG/DL    GFR Non-African American >60 >60 mL/min/1.73m2    GFR African American >60 >60 mL/min/1.73m2   Magnesium    Collection Time: 08/25/20  6:14 AM   Result Value Ref Range    Magnesium 2.1 1.8 - 2.4 mg/dl       Scheduled Meds:   enoxaparin  1 mg/kg Subcutaneous BID    potassium chloride  20 mEq Intravenous Once    meropenem  1 g Intravenous Q8H    amLODIPine  5 mg Oral Daily    sodium chloride flush  5 mL Intracatheter BID    lidocaine  1 patch Transdermal Daily    amantadine  100 mg Oral BID    aspirin  81 mg Oral Daily    escitalopram  10 mg Oral Nightly    rivastigmine  1.5 mg Oral BID    rosuvastatin  40 mg Oral QAM    bicalutamide  50 mg Oral Daily    sodium chloride flush  10 mL Intravenous 2 times per day    carbidopa-levodopa  2 tablet Oral 4x Daily    And    entacapone  200 mg Oral 4x Daily       Continuous Infusions:      Physical Exam:  HEENT: Anicteric sclerae, Oropharyngeal mucosae moist, pink and intact. Heart:  Normal S1 and S2, RRR  Lungs: Clear to auscultation bilaterally, No audible Wheezes or Rales. Extremities: No edema. Neuro: Alert and Oriented x 3, Non focal.  Abdomen: Soft, Benign, not MILDLY tender lower abdomen. ., Non distended, Positive bowel sounds. Principal Problem:    Pelvic mass  Resolved Problems:    * No resolved hospital problems. *      Assessment and Plan:  CT the day before the day before yesterday:  Impression    Right pelvic mass.  Differential diagnosis includes subacute hematoma or    infiltrative malignancy.         Mild right hydronephrosis due to compression of the ureter by the mass.         Bibasilar pulmonary consolidation or atelectasis.       CT the day before yesterday:  Impression    1.  There is a complex 5.5 x 4.7 cm collection in the right pelvis with    surrounding mild peripheral enhancement extends into the right iliopsoas    muscle and likely abuts and encases the right external iliac vessels.  There    is also obstruction of the right ureter resulting in mild    hydroureteronephrosis. Bethena Lighter is a fluid-filled thick-walled structure    extending from the cecum towards this collection raising suspicion for    perforated appendicitis with associated phlegmon/abscess.  Less likely    considerations include hematoma or malignancy either metastatic disease or    lymphoma. 2. Status post prostatectomy.  Otherwise no obvious urinary tract filling    defect or calculus given limitations due to motion artifact. Results were called by Dr. Gina Lockwood. Alexy Lopez MD to Fara Yuan on    8/18/2020 at 14:15. The day before Yesterday's IR:  Impression    1.  Technically successful placement of an 8 Irish drain in the right lower    quadrant collection.         2.  Aspiration of purulent material supports the diagnosis of ruptured    appendicitis with abscess\phlegmon, though continued attention on follow-up    is recommended.         RECOMMENDATIONS:    1.  Drain to bulb drainage.         2.  Flush right lower quadrant drain with 2-5 cc normal saline b.i.d.         3.  Record daily drain output. Diagnosis:      Patient Active Problem List   Diagnosis    Weight loss, unintentional    History of colon polyps    Pelvic mass        Assessment & plan:  Atul Shaw is a very pleasant 79 y.o. male presenting with a pelvic mass,  Perforated appendicitis, final cytology still pending. Discussed with pathologist, and they are working more stains, (for concerning finding? ). .     Percutaneous biopsy was done last week, and sent to the lab and pathology for cytology I asked IR to. . I'll manage according to its result. Due to his EXTREME Fragility. . ONLY if neoplastic would require surgery, but if it is ONLY perforated appendicitis, will hopefully manage non operatively. .      ___________________________________________    Bruno Anand MD, FACS, FICS  8/25/2020  1:47 PM

## 2020-08-25 NOTE — PROGRESS NOTES
Hospitalist Progress Note      Name:  Dustin Addison /Age/Sex: 1940  (79 y.o. male)   MRN & CSN:  [id number] & [id number] Admission Date/Time: 2020  7:25 PM   Location:  [address] [place] [address] PCP: Sigrid Reyez MD         Hospital Day: 12    History of Present Illness:     Chief Complaint: Pelvic mass  Dustin Addison is a 79 y.o.  male  who presents with Pelvic mass     -patient has no complaints at this time. He states he is not on oxygen at home. Ten point ROS reviewed negative, unless as noted above    Objective: Intake/Output Summary (Last 24 hours) at 2020 1505  Last data filed at 2020 1030  Gross per 24 hour   Intake 140 ml   Output 650 ml   Net -510 ml      Vitals:   Vitals:    20 0824   BP: 113/60   Pulse: 64   Resp: 16   Temp: 97.9 °F (36.6 °C)   SpO2: 100%     Physical Exam:   General Appearance: alert and awake in no apparent distress  Cardiovascular: normal rate, regular rhythm, normal S1 and S2, no murmurs  Pulmonary/Chest: clear to auscultation bilaterally  Abdomen: soft, non-tender, non-distended, normal bowel sounds, no masses.  RLQ drain tube seen  Extremities: no cyanosis, clubbing or edema, pulse   Skin: warm and dry, no rash or erythema  Head: normocephalic and atraumatic  Neck: able to turn head  Musculoskeletal: no edema, nontender  Neurological: awake and alert, appears to mumble    Medications:   Medications:    enoxaparin  1 mg/kg Subcutaneous BID    potassium chloride  20 mEq Intravenous Once    meropenem  1 g Intravenous Q8H    amLODIPine  5 mg Oral Daily    sodium chloride flush  5 mL Intracatheter BID    lidocaine  1 patch Transdermal Daily    amantadine  100 mg Oral BID    aspirin  81 mg Oral Daily    escitalopram  10 mg Oral Nightly    rivastigmine  1.5 mg Oral BID    rosuvastatin  40 mg Oral QAM    bicalutamide  50 mg Oral Daily    sodium chloride flush  10 mL Intravenous 2 times per day    carbidopa-levodopa  2 tablet Oral 4x Daily    And TECHNOLOGIST PROVIDED HISTORY: Reason for exam:->nausea, vomiting, abdominal pain Additional Contrast?->None Reason for Exam: nausea, vomiting, abdominal pain Acuity: Acute Type of Exam: Initial FINDINGS: Lower Chest: There is dependent consolidation in the lungs. Organs: There is mild right hydronephrosis. There is no obstructing stone. No acute abnormality of the liver, spleen, pancreas, adrenals, gallbladder, or left kidney. GI/Bowel: Bowel caliber is normal.  There is no evidence of active bowel inflammation. There is no evidence of acute appendicitis. Pelvis: In the right pelvic sidewall is a 56 x 67 x 66 mm (approximately) ill-defined mass adjacent to the iliac vessels. Borders of the mass are ill-defined. It medially displaces the right ureter. The mass appears somewhat elongated along the course of the iliac vein. The mass has an average density of 35 Hounsfield units. The urinary bladder is unremarkable. There are clips from prostatectomy. Peritoneum/Retroperitoneum: See above. No free air or free fluid. Bones/Soft Tissues: No acute osseous abnormality. Right pelvic mass. Differential diagnosis includes subacute hematoma or infiltrative malignancy. Mild right hydronephrosis due to compression of the ureter by the mass. Bibasilar pulmonary consolidation or atelectasis. Ct Head Wo Contrast    Result Date: 8/15/2020  EXAMINATION: CT OF THE HEAD WITHOUT CONTRAST 8/15/2020 6:09 pm TECHNIQUE: CT of the head was performed without the administration of intravenous contrast. Dose modulation, iterative reconstruction, and/or weight based adjustment of the mA/kV was utilized to reduce the radiation dose to as low as reasonably achievable. COMPARISON: Brain MRI 04/09/2018 HISTORY: ORDERING SYSTEM PROVIDED HISTORY: lethargic TECHNOLOGIST PROVIDED HISTORY: Reason for exam:->lethargic Has a \"code stroke\" or \"stroke alert\" been called? ->No Reason for Exam: lethargic Acuity: Acute Type of Exam: Initial FINDINGS: Patient motion in some areas, partially limiting evaluation of those areas. BRAIN/VENTRICLES:  No masses nor acute intracranial hemorrhage. Intact gray/white matter differentiation without findings of acute ischemia. No mass effect nor midline shift. Patent basilar cisterns and foramen magnum. No hydrocephalus. Age-appropriate mild to moderate diffuse atrophy. Mild to moderate subcortical, deep, and periventricular white matter hypodensities likely due to chronic small vessel ischemia with additional pontine involvement. ORBITS:  Bilateral lens implants. Otherwise normal without acute abnormality. SINUSES:  Normally pneumatized and aerated. SOFT TISSUES/SKULL:  No acute soft tissue abnormality. Moderate atherosclerotic calcifications. No acute fracture. 1. No acute intracranial abnormality. 2. Chronic changes as above. Assessment and Plan:   Dustin Addison is a 79 y.o.  male  who presents with Pelvic mass    Pelvic abscess s/p IR assisted drainage  CT A/P with right pelvic mass with right hydronephrosis  CT urography with 5.5 x 4.7 cm collection in the right pelvis with midl peripheral enhancement extending into the right iliopsoas muscle as well as obstruction of the right ureter with hydroureteronephrosis. Also fluid filled thick walled structure extending from the cecum towards this collection raising suspicion for perforated appendicitis with associated abscess   Not sure about the origin ? Perforated appendicitis  Fluids appear to be purulent  Fluid cultures with GPC and Gram negative bacilli, Cytology pending  Blood cultures negative  Continue Meropenem   MRSA screening negative  General surgery recommendations appreciated  Urology recommendations appreciated  Discussed with Dr Colin Shields, plan to wait until the cytology report, if negative for malignancy can avoid surgery and can be discharged with oral antibiotics, needing 7 more days.  -Has drain in right lower quadrant.   Stable from  standpoint. Urology to sign off. Follow-up with Dr. Fidelina Burr in 1 month. Rt upper extremity DVT  US DVT of the right upper arm DVT, Updated POA Teaster, will add oral on discharge  For now will change Lovenox to therapeutic    Acute metabolic encephalopathy-Improved  Likely from sepsis    Acute respiratory failure with Hypoxia-Improved  Pneumonia  COVID 19 test negative  Continue abx as above  Given  lasix   X ray with worsening bilateral airspace dz  Pro BNP slightly elevated   -on 2 L nasal cannula. Recheck CXR and BNP. SLIM - Improved  Likely from ATN with post obstructive uropathy  Hydronephrosis on kidney  Nephrology recommendations appreciated  -renal ultrasound: Unremarkable, no hydronephrosis. Moderate PCM  Dietary following, appreciated recommendations    Prostate cancer   S/p Prostatectomy    Anemia of Chronic disorders    Dementia with Parkinson's Dz      US DVT of the right upper arm DVT, Updated POA Teaster, will add oral on dischage  Diet DIET GENERAL; Dysphagia Minced and Moist  Dietary Nutrition Supplements: Standard Oral Supplement   DVT Prophylaxis [x] Lovenox, []  Heparin, [] SCDs, [] Ambulation   GI Prophylaxis [] PPI,  [] H2 Blocker,  [] Carafate,  [x] Diet/Tube Feeds   Code Status Full Code   Disposition -awaiting cytology report and surgery clearance. Case management working on SNF placement.    MDM [] Low, [x] Moderate,[]  High      Electronically signed by Isabell Winchester MD on 8/25/2020 at 3:05 PM

## 2020-08-26 LAB
ANION GAP SERPL CALCULATED.3IONS-SCNC: 8 MMOL/L (ref 4–16)
BUN BLDV-MCNC: 17 MG/DL (ref 6–23)
CALCIUM SERPL-MCNC: 9.3 MG/DL (ref 8.3–10.6)
CHLORIDE BLD-SCNC: 102 MMOL/L (ref 99–110)
CO2: 28 MMOL/L (ref 21–32)
CREAT SERPL-MCNC: 1 MG/DL (ref 0.9–1.3)
GFR AFRICAN AMERICAN: >60 ML/MIN/1.73M2
GFR NON-AFRICAN AMERICAN: >60 ML/MIN/1.73M2
GLUCOSE BLD-MCNC: 89 MG/DL (ref 70–99)
HCT VFR BLD CALC: 34.9 % (ref 42–52)
HEMOGLOBIN: 10.7 GM/DL (ref 13.5–18)
HIGH SENSITIVE C-REACTIVE PROTEIN: 33.9 MG/L
MAGNESIUM: 2.1 MG/DL (ref 1.8–2.4)
MCH RBC QN AUTO: 30 PG (ref 27–31)
MCHC RBC AUTO-ENTMCNC: 30.7 % (ref 32–36)
MCV RBC AUTO: 97.8 FL (ref 78–100)
PDW BLD-RTO: 14.3 % (ref 11.7–14.9)
PLATELET # BLD: 326 K/CU MM (ref 140–440)
PMV BLD AUTO: 9.4 FL (ref 7.5–11.1)
POTASSIUM SERPL-SCNC: 4.2 MMOL/L (ref 3.5–5.1)
PRO-BNP: 359 PG/ML
PROCALCITONIN: 0.08
RBC # BLD: 3.57 M/CU MM (ref 4.6–6.2)
SODIUM BLD-SCNC: 138 MMOL/L (ref 135–145)
WBC # BLD: 7.2 K/CU MM (ref 4–10.5)

## 2020-08-26 PROCEDURE — 99232 SBSQ HOSP IP/OBS MODERATE 35: CPT | Performed by: SURGERY

## 2020-08-26 PROCEDURE — 2580000003 HC RX 258: Performed by: INTERNAL MEDICINE

## 2020-08-26 PROCEDURE — 6370000000 HC RX 637 (ALT 250 FOR IP): Performed by: HOSPITALIST

## 2020-08-26 PROCEDURE — 6360000002 HC RX W HCPCS: Performed by: INTERNAL MEDICINE

## 2020-08-26 PROCEDURE — 80048 BASIC METABOLIC PNL TOTAL CA: CPT

## 2020-08-26 PROCEDURE — 92526 ORAL FUNCTION THERAPY: CPT

## 2020-08-26 PROCEDURE — 83735 ASSAY OF MAGNESIUM: CPT

## 2020-08-26 PROCEDURE — 1200000000 HC SEMI PRIVATE

## 2020-08-26 PROCEDURE — 6370000000 HC RX 637 (ALT 250 FOR IP): Performed by: NURSE PRACTITIONER

## 2020-08-26 PROCEDURE — 6370000000 HC RX 637 (ALT 250 FOR IP): Performed by: INTERNAL MEDICINE

## 2020-08-26 PROCEDURE — 83880 ASSAY OF NATRIURETIC PEPTIDE: CPT

## 2020-08-26 PROCEDURE — 6360000002 HC RX W HCPCS: Performed by: NURSE PRACTITIONER

## 2020-08-26 PROCEDURE — 94761 N-INVAS EAR/PLS OXIMETRY MLT: CPT

## 2020-08-26 PROCEDURE — 86141 C-REACTIVE PROTEIN HS: CPT

## 2020-08-26 PROCEDURE — 36415 COLL VENOUS BLD VENIPUNCTURE: CPT

## 2020-08-26 PROCEDURE — 85027 COMPLETE CBC AUTOMATED: CPT

## 2020-08-26 PROCEDURE — 97530 THERAPEUTIC ACTIVITIES: CPT

## 2020-08-26 PROCEDURE — 2580000003 HC RX 258: Performed by: RADIOLOGY

## 2020-08-26 PROCEDURE — 84145 PROCALCITONIN (PCT): CPT

## 2020-08-26 RX ADMIN — ENOXAPARIN SODIUM 70 MG: 80 INJECTION SUBCUTANEOUS at 21:39

## 2020-08-26 RX ADMIN — CARBIDOPA AND LEVODOPA 2 TABLET: 25; 100 TABLET ORAL at 17:55

## 2020-08-26 RX ADMIN — RIVASTIGMINE TARTRATE 1.5 MG: 1.5 CAPSULE ORAL at 21:38

## 2020-08-26 RX ADMIN — MEROPENEM 1 G: 1 INJECTION, POWDER, FOR SOLUTION INTRAVENOUS at 09:55

## 2020-08-26 RX ADMIN — ENTACAPONE 200 MG: 200 TABLET, FILM COATED ORAL at 14:03

## 2020-08-26 RX ADMIN — ASPIRIN 81 MG CHEWABLE TABLET 81 MG: 81 TABLET CHEWABLE at 09:59

## 2020-08-26 RX ADMIN — AMLODIPINE BESYLATE 5 MG: 5 TABLET ORAL at 10:00

## 2020-08-26 RX ADMIN — ROSUVASTATIN 40 MG: 40 TABLET, FILM COATED ORAL at 10:10

## 2020-08-26 RX ADMIN — MORPHINE SULFATE 2 MG: 2 INJECTION, SOLUTION INTRAMUSCULAR; INTRAVENOUS at 05:43

## 2020-08-26 RX ADMIN — CARBIDOPA AND LEVODOPA 2 TABLET: 25; 100 TABLET ORAL at 14:01

## 2020-08-26 RX ADMIN — CARBIDOPA AND LEVODOPA 2 TABLET: 25; 100 TABLET ORAL at 09:59

## 2020-08-26 RX ADMIN — CARBIDOPA AND LEVODOPA 2 TABLET: 25; 100 TABLET ORAL at 21:38

## 2020-08-26 RX ADMIN — RIVASTIGMINE TARTRATE 1.5 MG: 1.5 CAPSULE ORAL at 09:57

## 2020-08-26 RX ADMIN — ESCITALOPRAM OXALATE 10 MG: 10 TABLET ORAL at 21:38

## 2020-08-26 RX ADMIN — ENTACAPONE 200 MG: 200 TABLET, FILM COATED ORAL at 17:55

## 2020-08-26 RX ADMIN — SODIUM CHLORIDE, PRESERVATIVE FREE 5 ML: 5 INJECTION INTRAVENOUS at 10:04

## 2020-08-26 RX ADMIN — AMANTADINE HYDROCHLORIDE 100 MG: 100 CAPSULE, LIQUID FILLED ORAL at 10:09

## 2020-08-26 RX ADMIN — BICALUTAMIDE 50 MG: 50 TABLET ORAL at 09:57

## 2020-08-26 RX ADMIN — SODIUM CHLORIDE, PRESERVATIVE FREE 5 ML: 5 INJECTION INTRAVENOUS at 23:02

## 2020-08-26 RX ADMIN — ENOXAPARIN SODIUM 70 MG: 80 INJECTION SUBCUTANEOUS at 10:03

## 2020-08-26 RX ADMIN — SODIUM CHLORIDE, PRESERVATIVE FREE 10 ML: 5 INJECTION INTRAVENOUS at 09:56

## 2020-08-26 RX ADMIN — ENTACAPONE 200 MG: 200 TABLET, FILM COATED ORAL at 09:59

## 2020-08-26 RX ADMIN — MEROPENEM 1 G: 1 INJECTION, POWDER, FOR SOLUTION INTRAVENOUS at 17:55

## 2020-08-26 RX ADMIN — SODIUM CHLORIDE, PRESERVATIVE FREE 10 ML: 5 INJECTION INTRAVENOUS at 21:39

## 2020-08-26 RX ADMIN — MEROPENEM 1 G: 1 INJECTION, POWDER, FOR SOLUTION INTRAVENOUS at 04:03

## 2020-08-26 RX ADMIN — AMANTADINE HYDROCHLORIDE 100 MG: 100 CAPSULE, LIQUID FILLED ORAL at 21:38

## 2020-08-26 RX ADMIN — ENTACAPONE 200 MG: 200 TABLET, FILM COATED ORAL at 21:39

## 2020-08-26 ASSESSMENT — PAIN DESCRIPTION - ONSET: ONSET: UNABLE TO TELL

## 2020-08-26 ASSESSMENT — PAIN DESCRIPTION - DESCRIPTORS: DESCRIPTORS: PATIENT UNABLE TO DESCRIBE

## 2020-08-26 ASSESSMENT — PAIN SCALES - GENERAL: PAINLEVEL_OUTOF10: 7

## 2020-08-26 ASSESSMENT — PAIN DESCRIPTION - LOCATION: LOCATION: GENERALIZED

## 2020-08-26 ASSESSMENT — PAIN - FUNCTIONAL ASSESSMENT: PAIN_FUNCTIONAL_ASSESSMENT: PREVENTS OR INTERFERES SOME ACTIVE ACTIVITIES AND ADLS

## 2020-08-26 ASSESSMENT — PAIN DESCRIPTION - PROGRESSION: CLINICAL_PROGRESSION: NOT CHANGED

## 2020-08-26 ASSESSMENT — PAIN DESCRIPTION - ORIENTATION: ORIENTATION: RIGHT

## 2020-08-26 ASSESSMENT — PAIN DESCRIPTION - FREQUENCY: FREQUENCY: CONTINUOUS

## 2020-08-26 ASSESSMENT — PAIN DESCRIPTION - PAIN TYPE: TYPE: ACUTE PAIN

## 2020-08-26 NOTE — PROGRESS NOTES
Hospitalist Progress Note      Name:  Noel Dale /Age/Sex: 1940  (79 y.o. male)   MRN & CSN:  [id number] & [id number] Admission Date/Time: 2020  7:25 PM   Location:  [address] [place] [address] PCP: Alessandro Wei MD         Hospital Day: 13    History of Present Illness:     Chief Complaint: Pelvic mass  Noel Dale is a 79 y.o.  male  who presents with Pelvic mass     -Has no complaints at this time, no abdominal pain, just wanting to know when he can go    Ten point ROS reviewed negative, unless as noted above    Objective:        Intake/Output Summary (Last 24 hours) at 2020 1151  Last data filed at 2020 1834  Gross per 24 hour   Intake 10 ml   Output 860 ml   Net -850 ml      Vitals:   Vitals:    20 0825   BP: 122/65   Pulse: 70   Resp: 15   Temp: 97.6 °F (36.4 °C)   SpO2: 98%     Physical Exam:   General Appearance: alert and awake in no apparent distress  Cardiovascular: normal rate, regular rhythm, normal S1 and S2, no murmurs  Pulmonary/Chest: clear to auscultation bilaterally  Abdomen: soft, non-tender, non-distended,  RLQ drain tube seen  Extremities: no cyanosis, clubbing or edema, pulse   Skin: warm and dry, no rash or erythema  Head: normocephalic and atraumatic  Neck: able to turn head  Musculoskeletal: no edema, nontender  Neurological: awake and alert, appears to mumble, oriented to person and place    Medications:   Medications:    enoxaparin  1 mg/kg Subcutaneous BID    potassium chloride  20 mEq Intravenous Once    meropenem  1 g Intravenous Q8H    amLODIPine  5 mg Oral Daily    sodium chloride flush  5 mL Intracatheter BID    lidocaine  1 patch Transdermal Daily    amantadine  100 mg Oral BID    aspirin  81 mg Oral Daily    escitalopram  10 mg Oral Nightly    rivastigmine  1.5 mg Oral BID    rosuvastatin  40 mg Oral QAM    bicalutamide  50 mg Oral Daily    sodium chloride flush  10 mL Intravenous 2 times per day    carbidopa-levodopa  2 tablet Oral 4x Daily And    entacapone  200 mg Oral 4x Daily      Infusions:   PRN Meds: cloNIDine, 0.1 mg, TID PRN  potassium chloride, 10 mEq, PRN  sodium chloride flush, 10 mL, PRN  acetaminophen, 650 mg, Q6H PRN    Or  acetaminophen, 650 mg, Q6H PRN  polyethylene glycol, 17 g, Daily PRN  promethazine, 12.5 mg, Q6H PRN    Or  ondansetron, 4 mg, Q6H PRN  morphine, 2 mg, Q4H PRN            Pertinent New Labs & Imaging Studies     CBC with Differential:    Lab Results   Component Value Date    WBC 7.2 08/26/2020    RBC 3.57 08/26/2020    HGB 10.7 08/26/2020    HCT 34.9 08/26/2020     08/26/2020    MCV 97.8 08/26/2020    MCH 30.0 08/26/2020    MCHC 30.7 08/26/2020    RDW 14.3 08/26/2020    SEGSPCT 64.7 08/25/2020    LYMPHOPCT 23.2 08/25/2020    MONOPCT 7.4 08/25/2020    BASOPCT 0.8 08/25/2020    MONOSABS 0.5 08/25/2020    LYMPHSABS 1.5 08/25/2020    EOSABS 0.2 08/25/2020    BASOSABS 0.1 08/25/2020    DIFFTYPE AUTOMATED DIFFERENTIAL 08/25/2020     CMP:    Lab Results   Component Value Date     08/26/2020    K 4.2 08/26/2020     08/26/2020    CO2 28 08/26/2020    BUN 17 08/26/2020    CREATININE 1.0 08/26/2020    GFRAA >60 08/26/2020    LABGLOM >60 08/26/2020    GLUCOSE 89 08/26/2020    PROT 6.8 08/14/2020    LABALBU 3.1 08/14/2020    CALCIUM 9.3 08/26/2020    BILITOT 0.4 08/14/2020    ALKPHOS 98 08/14/2020    AST 11 08/14/2020    ALT <5 08/14/2020     Ct Abdomen Pelvis Wo Contrast Additional Contrast? None    Result Date: 8/16/2020  EXAMINATION: CT OF THE ABDOMEN AND PELVIS WITHOUT CONTRAST 8/14/2020 9:52 pm TECHNIQUE: CT of the abdomen and pelvis was performed without the administration of intravenous contrast. Multiplanar reformatted images are provided for review. Dose modulation, iterative reconstruction, and/or weight based adjustment of the mA/kV was utilized to reduce the radiation dose to as low as reasonably achievable. COMPARISON: None.  HISTORY: ORDERING SYSTEM PROVIDED HISTORY: nausea, vomiting, abdominal pain TECHNOLOGIST PROVIDED HISTORY: Reason for exam:->nausea, vomiting, abdominal pain Additional Contrast?->None Reason for Exam: nausea, vomiting, abdominal pain Acuity: Acute Type of Exam: Initial FINDINGS: Lower Chest: There is dependent consolidation in the lungs. Organs: There is mild right hydronephrosis. There is no obstructing stone. No acute abnormality of the liver, spleen, pancreas, adrenals, gallbladder, or left kidney. GI/Bowel: Bowel caliber is normal.  There is no evidence of active bowel inflammation. There is no evidence of acute appendicitis. Pelvis: In the right pelvic sidewall is a 56 x 67 x 66 mm (approximately) ill-defined mass adjacent to the iliac vessels. Borders of the mass are ill-defined. It medially displaces the right ureter. The mass appears somewhat elongated along the course of the iliac vein. The mass has an average density of 35 Hounsfield units. The urinary bladder is unremarkable. There are clips from prostatectomy. Peritoneum/Retroperitoneum: See above. No free air or free fluid. Bones/Soft Tissues: No acute osseous abnormality. Right pelvic mass. Differential diagnosis includes subacute hematoma or infiltrative malignancy. Mild right hydronephrosis due to compression of the ureter by the mass. Bibasilar pulmonary consolidation or atelectasis. Ct Head Wo Contrast    Result Date: 8/15/2020  EXAMINATION: CT OF THE HEAD WITHOUT CONTRAST 8/15/2020 6:09 pm TECHNIQUE: CT of the head was performed without the administration of intravenous contrast. Dose modulation, iterative reconstruction, and/or weight based adjustment of the mA/kV was utilized to reduce the radiation dose to as low as reasonably achievable. COMPARISON: Brain MRI 04/09/2018 HISTORY: ORDERING SYSTEM PROVIDED HISTORY: lethargic TECHNOLOGIST PROVIDED HISTORY: Reason for exam:->lethargic Has a \"code stroke\" or \"stroke alert\" been called? ->No Reason for Exam: lethargic Acuity: Acute Type of Exam: Initial FINDINGS: Patient motion in some areas, partially limiting evaluation of those areas. BRAIN/VENTRICLES:  No masses nor acute intracranial hemorrhage. Intact gray/white matter differentiation without findings of acute ischemia. No mass effect nor midline shift. Patent basilar cisterns and foramen magnum. No hydrocephalus. Age-appropriate mild to moderate diffuse atrophy. Mild to moderate subcortical, deep, and periventricular white matter hypodensities likely due to chronic small vessel ischemia with additional pontine involvement. ORBITS:  Bilateral lens implants. Otherwise normal without acute abnormality. SINUSES:  Normally pneumatized and aerated. SOFT TISSUES/SKULL:  No acute soft tissue abnormality. Moderate atherosclerotic calcifications. No acute fracture. 1. No acute intracranial abnormality. 2. Chronic changes as above. Assessment and Plan:   Samantha Lester is a 79 y.o.  male  who presents with Pelvic mass    Pelvic abscess s/p IR assisted drainage  CT A/P with right pelvic mass with right hydronephrosis  CT urography with 5.5 x 4.7 cm collection in the right pelvis with midl peripheral enhancement extending into the right iliopsoas muscle as well as obstruction of the right ureter with hydroureteronephrosis. Also fluid filled thick walled structure extending from the cecum towards this collection raising suspicion for perforated appendicitis with associated abscess   Not sure about the origin ? Perforated appendicitis  Fluids appear to be purulent  Fluid cultures with GPC and Gram negative bacilli, Cytology pending  Blood cultures negative  Continue Meropenem   MRSA screening negative  General surgery recommendations appreciated  Urology recommendations appreciated  Discussed with Dr Guanako Earl, plan to wait until the cytology report, if negative for malignancy can avoid surgery and can be discharged with oral antibiotics, needing 7 more days. Has drain in right lower quadrant.   Stable from  standpoint. Urology to sign off. Follow-up with Dr. Silverio Latham in 1 month. -CRP 33. Procalcitonin 0.082. Discussed with pathology and cytology should be done by tomorrow. Rt upper extremity DVT  US DVT of the right upper arm DVT, Updated POA Teaster, will add oral on discharge  For now will change Lovenox to therapeutic    Acute metabolic encephalopathy-Improved  Likely from sepsis    Acute respiratory failure with Hypoxia-Improved  Pneumonia  COVID 19 test negative  Continue abx as above  Given  lasix   X ray with worsening bilateral airspace dz  -CXR: Improvement in aeration of lungs. On room air. BNP improved to 359. SLIM -resolved  Likely from ATN with post obstructive uropathy  Hydronephrosis on kidney  Nephrology recommendations appreciated  Renal ultrasound: Unremarkable, no hydronephrosis. Moderate PCM  Dietary following, appreciated recommendations    Prostate cancer   S/p Prostatectomy    Anemia of Chronic disorders    Dementia with Parkinson's Dz      US DVT of the right upper arm DVT, Updated POA Teaster, will add oral on dischage  Diet DIET GENERAL; Dysphagia Minced and Moist  Dietary Nutrition Supplements: Standard Oral Supplement   DVT Prophylaxis [x] Lovenox, []  Heparin, [] SCDs, [] Ambulation   GI Prophylaxis [] PPI,  [] H2 Blocker,  [] Carafate,  [x] Diet/Tube Feeds   Code Status Full Code   Disposition -awaiting cytology report and surgery clearance. Case management working on SNF placement.    MDM [] Low, [x] Moderate,[]  High      Electronically signed by Jarred cEkert MD on 8/26/2020 at 11:51 AM

## 2020-08-26 NOTE — CARE COORDINATION
CM spoke with [facility] from [facility], SHIRIN CHAVARRIA II.BREANN [phone].   Faxed Pt info to order DME including a hospital bed and patient lift for possible discharge

## 2020-08-26 NOTE — PROGRESS NOTES
results for input(s): CHOL, HDL in the last 72 hours. Invalid input(s): LDLCALCU  ABGs: No results found for: PHART, PO2ART, AAH4HKF  INR:   Recent Labs     08/24/20  0448   INR 1.06     Renal Labs  Albumin:    Lab Results   Component Value Date    LABALBU 3.1 08/14/2020     Calcium:    Lab Results   Component Value Date    CALCIUM 9.3 08/26/2020     Phosphorus:  No results found for: PHOS  U/A:    Lab Results   Component Value Date    NITRU NEGATIVE 08/15/2020    COLORU YELLOW 08/15/2020    WBCUA 1 08/15/2020    RBCUA 6 08/15/2020    MUCUS RARE 08/15/2020    TRICHOMONAS NONE SEEN 08/15/2020    BACTERIA NEGATIVE 08/15/2020    CLARITYU CLEAR 08/15/2020    SPECGRAV 1.013 08/15/2020    UROBILINOGEN NORMAL 08/15/2020    BILIRUBINUR NEGATIVE 08/15/2020    BLOODU SMALL 08/15/2020    KETUA MODERATE 08/15/2020     ABG:  No results found for: PHART, PBJ7XCY, PO2ART, RQV1LML, BEART, THGBART, WJU6BOV, D4UGUAFI  HgBA1c:  No results found for: LABA1C  Microalbumen/Creatinine ratio:  No components found for: RUCREAT    Objective:   Vitals: /65   Pulse 70   Temp 97.6 °F (36.4 °C) (Oral)   Resp 15   Ht 6' 0.99\" (1.854 m)   Wt 156 lb (70.8 kg)   SpO2 98%   BMI 20.59 kg/m²      General appearance:  awake and verbally interactive; confused  HEENT: Head: normocephalic, atraumatic. Neck: supple, symmetrical, trachea midline  Cardiovascular: normal S1 and S2  Pulmonary: diminished lung sounds bilaterally   Abdomen:  soft / non-tender   Extremities: Trace edema to bilateral lower legs     -Right sided pelvic drain intact     Patient Active Problem List:     Weight loss, unintentional     History of colon polyps     Pelvic mass    Assessment and Plan:    IMP:  1 arf from atn/pra/hydro  2 right side mass fluid collection  -right sided drain placement     3 hx prostate ca  4 HTN   5 protein malnutrtion   6.  Hypokalemia     Plan     1.   -uop: 850 ml in the last 24 hours   -baseline serum creatinine with normal: Na / K / CO2

## 2020-08-26 NOTE — PROGRESS NOTES
Physical Therapy    Physical Therapy Treatment Note  Name: Lorenza Vasquez MRN: [id number] :   1940   Date:  2020   Admission Date: 2020 Room:  [address] [place] [address]   Restrictions/Precautions:        Fall risk  Communication with other providers:  RN  Subjective:  Patient states: \"My arms and legs ache\"  Pain:   Location, Type, Intensity (0/10 to 10/10):  3/10, generalized  Objective:    Observation:  Supine in bed  Treatment, including education/measures:  Sup to sit: mod A, difficulty sequencing, able to perform B knee flexion and bridging but mechanics not functional for transfer, tactile cues to guide LE off the bed and mod A at trunk. Sitting balance: CGA d/t instability, fidgeting behaviors with concern for impulsivity d/t min confusion. Tolerated well with improving posture x5 min. Transfers: STS from bed (x2) with mod A, unable to come to full stand d/t knee/hip/trunk flexion despite cues. Performed squat pivot transfer to chair with mod A, min A for standing portion with mod A for pivot. Safety: left in chair with chair alarm, call light within reach, gait belt used, RN notified. Assessment / Impression:       Patient's tolerance of treatment:  Tolerated well   Adverse Reaction: None  Significant change in status and impact:  Improved mobility  Barriers to improvement:  PD, confusion vs difficulty communicating, NM control, stiffness. Plan for Next Session:    Cont POC.    Time in:  1100  Time out:  1125  Timed treatment minutes:  25  Total treatment time:  25    Previously filed items:     Short term goals  Time Frame for Short term goals: 1 week  Short term goal 1: Pt will roll bilat modA  Short term goal 2: Pt will transition sit><supine modA  Short term goal 3: Pt will transfer between surfaces maxA  Short term goal 4: Pt will perform light dynamic sitting activity with single UE support x 5 minutes CGA       Electronically signed by:    Raul Melgar, PT  2020, 11:22 AM  \

## 2020-08-26 NOTE — PROGRESS NOTES
GENERAL SURGERY PROGRESS NOTE    CC/HPI:           Patient feels better and eating OK. Vitals:    08/25/20 1716 08/25/20 1815 08/25/20 2100 08/26/20 0400   BP: 115/62  111/64 126/69   Pulse: 71 67 57 67   Resp: 16  16 16   Temp: 98 °F (36.7 °C)  97.9 °F (36.6 °C) 97.8 °F (36.6 °C)   TempSrc: Oral  Oral Oral   SpO2: 100% 100% 99% 100%   Weight:       Height:         I/O last 3 completed shifts: In: 30 [I.V.:30]  Out: 860 [Urine:850; Drains:10]  No intake/output data recorded.     DIET GENERAL; Dysphagia Minced and Moist  Dietary Nutrition Supplements: Standard Oral Supplement    Recent Results (from the past 48 hour(s))   CBC Auto Differential    Collection Time: 08/25/20  6:14 AM   Result Value Ref Range    WBC 6.5 4.0 - 10.5 K/CU MM    RBC 3.64 (L) 4.6 - 6.2 M/CU MM    Hemoglobin 10.7 (L) 13.5 - 18.0 GM/DL    Hematocrit 35.3 (L) 42 - 52 %    MCV 97.0 78 - 100 FL    MCH 29.4 27 - 31 PG    MCHC 30.3 (L) 32.0 - 36.0 %    RDW 14.3 11.7 - 14.9 %    Platelets 389 607 - 484 K/CU MM    MPV 9.3 7.5 - 11.1 FL    Differential Type AUTOMATED DIFFERENTIAL     Segs Relative 64.7 36 - 66 %    Lymphocytes % 23.2 (L) 24 - 44 %    Monocytes % 7.4 (H) 0 - 4 %    Eosinophils % 2.5 0 - 3 %    Basophils % 0.8 0 - 1 %    Segs Absolute 4.2 K/CU MM    Lymphocytes Absolute 1.5 K/CU MM    Monocytes Absolute 0.5 K/CU MM    Eosinophils Absolute 0.2 K/CU MM    Basophils Absolute 0.1 K/CU MM    Nucleated RBC % 0.0 %    Total Nucleated RBC 0.0 K/CU MM    Total Immature Neutrophil 0.09 K/CU MM    Immature Neutrophil % 1.4 (H) 0 - 0.43 %   Basic metabolic panel    Collection Time: 08/25/20  6:14 AM   Result Value Ref Range    Sodium 137 135 - 145 MMOL/L    Potassium 4.3 3.5 - 5.1 MMOL/L    Chloride 101 99 - 110 mMol/L    CO2 31 21 - 32 MMOL/L    Anion Gap 5 4 - 16    BUN 19 6 - 23 MG/DL    CREATININE 1.1 0.9 - 1.3 MG/DL    Glucose 93 70 - 99 MG/DL    Calcium 9.1 8.3 - 10.6 MG/DL    GFR Non-African American >60 >60 mL/min/1.73m2    GFR African American >60 >60 mL/min/1.73m2   Magnesium    Collection Time: 08/25/20  6:14 AM   Result Value Ref Range    Magnesium 2.1 1.8 - 2.4 mg/dl   Magnesium    Collection Time: 08/26/20  5:25 AM   Result Value Ref Range    Magnesium 2.1 1.8 - 2.4 mg/dl   C-Reactive Protein    Collection Time: 08/26/20  5:25 AM   Result Value Ref Range    CRP, High Sensitivity 33.9 mg/L   Brain Natriuretic Peptide    Collection Time: 08/26/20  5:25 AM   Result Value Ref Range    Pro-.0 (H) <300 PG/ML   Procalcitonin    Collection Time: 08/26/20  5:25 AM   Result Value Ref Range    Procalcitonin 2.660    Basic Metabolic Panel    Collection Time: 08/26/20  5:25 AM   Result Value Ref Range    Sodium 138 135 - 145 MMOL/L    Potassium 4.2 3.5 - 5.1 MMOL/L    Chloride 102 99 - 110 mMol/L    CO2 28 21 - 32 MMOL/L    Anion Gap 8 4 - 16    BUN 17 6 - 23 MG/DL    CREATININE 1.0 0.9 - 1.3 MG/DL    Glucose 89 70 - 99 MG/DL    Calcium 9.3 8.3 - 10.6 MG/DL    GFR Non-African American >60 >60 mL/min/1.73m2    GFR African American >60 >60 mL/min/1.73m2   CBC    Collection Time: 08/26/20  5:25 AM   Result Value Ref Range    WBC 7.2 4.0 - 10.5 K/CU MM    RBC 3.57 (L) 4.6 - 6.2 M/CU MM    Hemoglobin 10.7 (L) 13.5 - 18.0 GM/DL    Hematocrit 34.9 (L) 42 - 52 %    MCV 97.8 78 - 100 FL    MCH 30.0 27 - 31 PG    MCHC 30.7 (L) 32.0 - 36.0 %    RDW 14.3 11.7 - 14.9 %    Platelets 137 187 - 668 K/CU MM    MPV 9.4 7.5 - 11.1 FL       Scheduled Meds:   enoxaparin  1 mg/kg Subcutaneous BID    potassium chloride  20 mEq Intravenous Once    meropenem  1 g Intravenous Q8H    amLODIPine  5 mg Oral Daily    sodium chloride flush  5 mL Intracatheter BID    lidocaine  1 patch Transdermal Daily    amantadine  100 mg Oral BID    aspirin  81 mg Oral Daily    escitalopram  10 mg Oral Nightly    rivastigmine  1.5 mg Oral BID    rosuvastatin  40 mg Oral QAM    bicalutamide  50 mg Oral Daily    sodium chloride flush  10 mL Intravenous 2 times per day    carbidopa-levodopa  2 tablet Oral 4x Daily    And    entacapone  200 mg Oral 4x Daily       Continuous Infusions:      Physical Exam:  HEENT: Anicteric sclerae, Oropharyngeal mucosae moist, pink and intact. Heart:  Normal S1 and S2, RRR  Lungs: Clear to auscultation bilaterally, No audible Wheezes or Rales. Extremities: No edema. Neuro: Alert and Oriented x 3, Non focal.  Abdomen: Soft, Benign, not MILDLY tender lower abdomen. ., Non distended, Positive bowel sounds. Principal Problem:    Pelvic mass  Resolved Problems:    * No resolved hospital problems. *      Assessment and Plan:  CT the day before the day before yesterday:  Impression    Right pelvic mass.  Differential diagnosis includes subacute hematoma or    infiltrative malignancy.         Mild right hydronephrosis due to compression of the ureter by the mass.         Bibasilar pulmonary consolidation or atelectasis.       CT the day before yesterday:  Impression    1. There is a complex 5.5 x 4.7 cm collection in the right pelvis with    surrounding mild peripheral enhancement extends into the right iliopsoas    muscle and likely abuts and encases the right external iliac vessels. Grace Comment    is also obstruction of the right ureter resulting in mild    hydroureteronephrosis. Grace Comment is a fluid-filled thick-walled structure    extending from the cecum towards this collection raising suspicion for    perforated appendicitis with associated phlegmon/abscess.  Less likely    considerations include hematoma or malignancy either metastatic disease or    lymphoma. 2. Status post prostatectomy.  Otherwise no obvious urinary tract filling    defect or calculus given limitations due to motion artifact. Results were called by Dr. Vicki Calhoun. Jorge L Rose MD to Johanna Gonzales on    8/18/2020 at 14:15. The day before Yesterday's IR:  Impression    1.  Technically successful placement of an 8 Vietnamese drain in the right lower    quadrant collection.         2.

## 2020-08-27 LAB — MAGNESIUM: 2.3 MG/DL (ref 1.8–2.4)

## 2020-08-27 PROCEDURE — 99232 SBSQ HOSP IP/OBS MODERATE 35: CPT | Performed by: SURGERY

## 2020-08-27 PROCEDURE — 6370000000 HC RX 637 (ALT 250 FOR IP): Performed by: INTERNAL MEDICINE

## 2020-08-27 PROCEDURE — 97530 THERAPEUTIC ACTIVITIES: CPT

## 2020-08-27 PROCEDURE — 1200000000 HC SEMI PRIVATE

## 2020-08-27 PROCEDURE — 94761 N-INVAS EAR/PLS OXIMETRY MLT: CPT

## 2020-08-27 PROCEDURE — 6360000002 HC RX W HCPCS: Performed by: INTERNAL MEDICINE

## 2020-08-27 PROCEDURE — 83735 ASSAY OF MAGNESIUM: CPT

## 2020-08-27 PROCEDURE — 2580000003 HC RX 258: Performed by: INTERNAL MEDICINE

## 2020-08-27 PROCEDURE — 97112 NEUROMUSCULAR REEDUCATION: CPT

## 2020-08-27 PROCEDURE — 6370000000 HC RX 637 (ALT 250 FOR IP): Performed by: HOSPITALIST

## 2020-08-27 PROCEDURE — 2580000003 HC RX 258: Performed by: RADIOLOGY

## 2020-08-27 PROCEDURE — 6370000000 HC RX 637 (ALT 250 FOR IP): Performed by: NURSE PRACTITIONER

## 2020-08-27 PROCEDURE — 36415 COLL VENOUS BLD VENIPUNCTURE: CPT

## 2020-08-27 RX ADMIN — AMLODIPINE BESYLATE 5 MG: 5 TABLET ORAL at 08:55

## 2020-08-27 RX ADMIN — ENTACAPONE 200 MG: 200 TABLET, FILM COATED ORAL at 22:29

## 2020-08-27 RX ADMIN — SODIUM CHLORIDE, PRESERVATIVE FREE 10 ML: 5 INJECTION INTRAVENOUS at 08:59

## 2020-08-27 RX ADMIN — RIVASTIGMINE TARTRATE 1.5 MG: 1.5 CAPSULE ORAL at 22:30

## 2020-08-27 RX ADMIN — ENTACAPONE 200 MG: 200 TABLET, FILM COATED ORAL at 08:55

## 2020-08-27 RX ADMIN — ENOXAPARIN SODIUM 70 MG: 80 INJECTION SUBCUTANEOUS at 20:40

## 2020-08-27 RX ADMIN — MEROPENEM 1 G: 1 INJECTION, POWDER, FOR SOLUTION INTRAVENOUS at 03:37

## 2020-08-27 RX ADMIN — SODIUM CHLORIDE, PRESERVATIVE FREE 10 ML: 5 INJECTION INTRAVENOUS at 20:40

## 2020-08-27 RX ADMIN — ROSUVASTATIN 40 MG: 40 TABLET, FILM COATED ORAL at 08:56

## 2020-08-27 RX ADMIN — SODIUM CHLORIDE, PRESERVATIVE FREE 5 ML: 5 INJECTION INTRAVENOUS at 20:41

## 2020-08-27 RX ADMIN — ASPIRIN 81 MG CHEWABLE TABLET 81 MG: 81 TABLET CHEWABLE at 08:56

## 2020-08-27 RX ADMIN — ENTACAPONE 200 MG: 200 TABLET, FILM COATED ORAL at 13:31

## 2020-08-27 RX ADMIN — ESCITALOPRAM OXALATE 10 MG: 10 TABLET ORAL at 20:40

## 2020-08-27 RX ADMIN — AMANTADINE HYDROCHLORIDE 100 MG: 100 CAPSULE, LIQUID FILLED ORAL at 20:40

## 2020-08-27 RX ADMIN — ENOXAPARIN SODIUM 70 MG: 80 INJECTION SUBCUTANEOUS at 08:58

## 2020-08-27 RX ADMIN — MEROPENEM 1 G: 1 INJECTION, POWDER, FOR SOLUTION INTRAVENOUS at 17:29

## 2020-08-27 RX ADMIN — CARBIDOPA AND LEVODOPA 2 TABLET: 25; 100 TABLET ORAL at 08:56

## 2020-08-27 RX ADMIN — CARBIDOPA AND LEVODOPA 2 TABLET: 25; 100 TABLET ORAL at 17:28

## 2020-08-27 RX ADMIN — AMANTADINE HYDROCHLORIDE 100 MG: 100 CAPSULE, LIQUID FILLED ORAL at 08:56

## 2020-08-27 RX ADMIN — BICALUTAMIDE 50 MG: 50 TABLET ORAL at 08:54

## 2020-08-27 RX ADMIN — MEROPENEM 1 G: 1 INJECTION, POWDER, FOR SOLUTION INTRAVENOUS at 13:29

## 2020-08-27 RX ADMIN — ENTACAPONE 200 MG: 200 TABLET, FILM COATED ORAL at 17:28

## 2020-08-27 RX ADMIN — CARBIDOPA AND LEVODOPA 2 TABLET: 25; 100 TABLET ORAL at 20:40

## 2020-08-27 RX ADMIN — RIVASTIGMINE TARTRATE 1.5 MG: 1.5 CAPSULE ORAL at 08:55

## 2020-08-27 RX ADMIN — CARBIDOPA AND LEVODOPA 2 TABLET: 25; 100 TABLET ORAL at 13:29

## 2020-08-27 ASSESSMENT — PAIN SCALES - WONG BAKER
WONGBAKER_NUMERICALRESPONSE: 0

## 2020-08-27 ASSESSMENT — PAIN SCALES - GENERAL
PAINLEVEL_OUTOF10: 0
PAINLEVEL_OUTOF10: 5
PAINLEVEL_OUTOF10: 0
PAINLEVEL_OUTOF10: 0

## 2020-08-27 ASSESSMENT — PAIN DESCRIPTION - PAIN TYPE: TYPE: ACUTE PAIN;CHRONIC PAIN

## 2020-08-27 ASSESSMENT — PAIN DESCRIPTION - LOCATION: LOCATION: BACK

## 2020-08-27 ASSESSMENT — PAIN DESCRIPTION - DESCRIPTORS: DESCRIPTORS: PATIENT UNABLE TO DESCRIBE

## 2020-08-27 ASSESSMENT — PAIN DESCRIPTION - ONSET: ONSET: ON-GOING

## 2020-08-27 ASSESSMENT — PAIN - FUNCTIONAL ASSESSMENT: PAIN_FUNCTIONAL_ASSESSMENT: PREVENTS OR INTERFERES SOME ACTIVE ACTIVITIES AND ADLS

## 2020-08-27 ASSESSMENT — PAIN DESCRIPTION - PROGRESSION: CLINICAL_PROGRESSION: NOT CHANGED

## 2020-08-27 ASSESSMENT — PAIN DESCRIPTION - FREQUENCY: FREQUENCY: CONTINUOUS

## 2020-08-27 NOTE — CARE COORDINATION
TC to [facility], spoke with [facility] and confirmed they received DME orders/ documentation and have processed equipment for delivery. Attempted to call pt's spouse x2 and line disconnected, on 3rd attempt went to voicemail and voicemail is full. Will try again. TC to pt's wife Laura and confirmed she is still anticipating taking pt home upon discharge. I spoke with therapy on 8/26 and they felt pt was showing some improvement and home could be doable if family felt prepared, had help and equipment. Per Laura she still feels able to care for pt at home. Advised that we have referred to [facility] per her request and ordered needed equipment. She requested I email her at [email or web address] with info. Email sent. (received vm from Traci Dupont that they are NOT in network with [employer] and were unable to obtain authorization. As this was patients back up plan and wife still wanting to take pt home I did not refer to another facility at this time)    At this time plan remains home with spouse and [facility].

## 2020-08-27 NOTE — PROGRESS NOTES
Physical Therapy    Physical Therapy Treatment Note  Name: Redd Gonzalez MRN: [id number] :   1940   Date:  2020   Admission Date: 2020 Room:  [address] [place] [address]   Restrictions/Precautions:        general precautions, falls, chair alarm   Subjective:  Patient states: \"Do I have my shoes here\"   Pain:   Location, Type, Intensity (0/10 to 10/10): denies   Objective:    Observation:    Supine in bed. Agreeable to work with therapy. Alert and oriented to person/place. Treatment, including education/measures:  Supine to sit: maxA for partial advancement of BLEs, hip guidance, and uprighting trunk. Rigid and retropulsive. Sit to stand: 2x from EOB, 3x from [address] for lift and heavy weight shift assist anterior over CARLOS. Required tactile and verbal cues as well as physical assistance with set up of LEs and attaining midline of trunk prior to initiating transition. Very narrowed CARLOS with posterior and lateral lean to the right. Stand to sit: min-modA for eccentric control lowering to EOB and recliner. Stand pivot transfer: 3x between bed and recliner modA for lift and sequencing pivot turn. Heavy weight shift assist and tactile/verbal cues for sequencing rotation. Very rigid and would freeze during transfer. Was able to take 1 step during on of the transfers requiring slightly less assist with rotation on that trial. Step by step cues for hand sequencing to use chair arm rest for support and facilitate weight shift during transfer. Standing balance: ~2 minutes x 2 trials standing at bed rail Geovanny with facilitation anterior over CARLOS. Static stance progressing to lateral weight shift and 5x2 sets of alternating marches. Static stance with bilat UE support on therapists arms (PT anterior position in relation to pt) x ~30 seconds 2x Geovanny. Maintained slightly flexed bilat LEs due to dec total knee extension with flexion contractures.    Sitting balance: initially Geovanny progressing to SBA with static sitting using BUEs. Dynamic activity required Geovanny with pt demonstrating slightly posterior and right lateral lean. Educated pt on POC, role of PT.  Cues provided for sequencing, weight shift, UE/LE placement, and posture during mobility to inc safety and indep  Assessment / Impression:    Patient's tolerance of treatment:  Good   Adverse Reaction: no  Significant change in status and impact:  Inc mobility tolerance and indep compared to initial eval.   Barriers to improvement:  Activity tolerance, strength, balance, cognition, PD  Plan for Next Session:    Activity tolerance, strength, balance, gait, transfers, WC mobility, bed mobility, neuro re-ed  Time in:  1500  Time out:  1525  Timed treatment minutes:  25  Total treatment time:  25    Previously filed items:     Short term goals  Time Frame for Short term goals: 1 week  Short term goal 1: Pt will roll bilat modA  Short term goal 2: Pt will transition sit><supine modA  Short term goal 3: Pt will transfer between surfaces maxA  Short term goal 4: Pt will perform light dynamic sitting activity with single UE support x 5 minutes CGA       Electronically signed by:    Roma Sun [id number]  8/27/2020, 4:04 PM

## 2020-08-27 NOTE — PROGRESS NOTES
Nilson Boogie called this evening gave an update to the plan doctors notes along with his labs this morning. All questions answered will continue to monitor.  He would like the hospitalist to call him tomorrow with any updates

## 2020-08-27 NOTE — PROGRESS NOTES
Hospitalist Progress Note      Name:  Marry Cohn /Age/Sex: 1940  (79 y.o. male)   MRN & CSN:  [id number] & [id number] Admission Date/Time: 2020  7:25 PM   Location:  [address] [place] [address] PCP: August Baker MD         Hospital Day: 14    History of Present Illness:     Chief Complaint: Pelvic mass  Marry Cohn is a 79 y.o.  male  who presents with Pelvic mass     -no complaints, no pain  -nurse states there is still some blood coming from the drain    Ten point ROS reviewed negative, unless as noted above    Objective:        Intake/Output Summary (Last 24 hours) at 2020 1153  Last data filed at 2020 0700  Gross per 24 hour   Intake 120 ml   Output 1465 ml   Net -1345 ml      Vitals:   Vitals:    20 0849   BP: 136/72   Pulse: 68   Resp: 16   Temp: 97.6 °F (36.4 °C)   SpO2: 96%     Physical Exam:   General Appearance: alert and awake in no apparent distress, laying in bed  Cardiovascular: normal rate, regular rhythm, normal S1 and S2, no murmurs  Pulmonary/Chest: clear to auscultation bilaterally  Abdomen: soft, non-tender, non-distended,  RLQ drain tube seen  Extremities: no cyanosis, clubbing or edema, pulse   Skin: warm and dry, no rash or erythema  Head: normocephalic and atraumatic  Neck: able to turn head  Musculoskeletal: no edema, nontender  Neurological: awake and alert, appears to mumble, oriented to person and place    Medications:   Medications:    enoxaparin  1 mg/kg Subcutaneous BID    potassium chloride  20 mEq Intravenous Once    meropenem  1 g Intravenous Q8H    amLODIPine  5 mg Oral Daily    sodium chloride flush  5 mL Intracatheter BID    lidocaine  1 patch Transdermal Daily    amantadine  100 mg Oral BID    aspirin  81 mg Oral Daily    escitalopram  10 mg Oral Nightly    rivastigmine  1.5 mg Oral BID    rosuvastatin  40 mg Oral QAM    bicalutamide  50 mg Oral Daily    sodium chloride flush  10 mL Intravenous 2 times per day    carbidopa-levodopa  2 tablet Oral 4x Daily    And    entacapone  200 mg Oral 4x Daily      Infusions:   PRN Meds: cloNIDine, 0.1 mg, TID PRN  potassium chloride, 10 mEq, PRN  sodium chloride flush, 10 mL, PRN  acetaminophen, 650 mg, Q6H PRN    Or  acetaminophen, 650 mg, Q6H PRN  polyethylene glycol, 17 g, Daily PRN  promethazine, 12.5 mg, Q6H PRN    Or  ondansetron, 4 mg, Q6H PRN  morphine, 2 mg, Q4H PRN            Pertinent New Labs & Imaging Studies     CBC with Differential:    Lab Results   Component Value Date    WBC 7.2 08/26/2020    RBC 3.57 08/26/2020    HGB 10.7 08/26/2020    HCT 34.9 08/26/2020     08/26/2020    MCV 97.8 08/26/2020    MCH 30.0 08/26/2020    MCHC 30.7 08/26/2020    RDW 14.3 08/26/2020    SEGSPCT 64.7 08/25/2020    LYMPHOPCT 23.2 08/25/2020    MONOPCT 7.4 08/25/2020    BASOPCT 0.8 08/25/2020    MONOSABS 0.5 08/25/2020    LYMPHSABS 1.5 08/25/2020    EOSABS 0.2 08/25/2020    BASOSABS 0.1 08/25/2020    DIFFTYPE AUTOMATED DIFFERENTIAL 08/25/2020     CMP:    Lab Results   Component Value Date     08/26/2020    K 4.2 08/26/2020     08/26/2020    CO2 28 08/26/2020    BUN 17 08/26/2020    CREATININE 1.0 08/26/2020    GFRAA >60 08/26/2020    LABGLOM >60 08/26/2020    GLUCOSE 89 08/26/2020    PROT 6.8 08/14/2020    LABALBU 3.1 08/14/2020    CALCIUM 9.3 08/26/2020    BILITOT 0.4 08/14/2020    ALKPHOS 98 08/14/2020    AST 11 08/14/2020    ALT <5 08/14/2020     Ct Abdomen Pelvis Wo Contrast Additional Contrast? None    Result Date: 8/16/2020  EXAMINATION: CT OF THE ABDOMEN AND PELVIS WITHOUT CONTRAST 8/14/2020 9:52 pm TECHNIQUE: CT of the abdomen and pelvis was performed without the administration of intravenous contrast. Multiplanar reformatted images are provided for review. Dose modulation, iterative reconstruction, and/or weight based adjustment of the mA/kV was utilized to reduce the radiation dose to as low as reasonably achievable. COMPARISON: None.  HISTORY: ORDERING SYSTEM PROVIDED HISTORY: nausea, vomiting, abdominal pain TECHNOLOGIST PROVIDED HISTORY: Reason for exam:->nausea, vomiting, abdominal pain Additional Contrast?->None Reason for Exam: nausea, vomiting, abdominal pain Acuity: Acute Type of Exam: Initial FINDINGS: Lower Chest: There is dependent consolidation in the lungs. Organs: There is mild right hydronephrosis. There is no obstructing stone. No acute abnormality of the liver, spleen, pancreas, adrenals, gallbladder, or left kidney. GI/Bowel: Bowel caliber is normal.  There is no evidence of active bowel inflammation. There is no evidence of acute appendicitis. Pelvis: In the right pelvic sidewall is a 56 x 67 x 66 mm (approximately) ill-defined mass adjacent to the iliac vessels. Borders of the mass are ill-defined. It medially displaces the right ureter. The mass appears somewhat elongated along the course of the iliac vein. The mass has an average density of 35 Hounsfield units. The urinary bladder is unremarkable. There are clips from prostatectomy. Peritoneum/Retroperitoneum: See above. No free air or free fluid. Bones/Soft Tissues: No acute osseous abnormality. Right pelvic mass. Differential diagnosis includes subacute hematoma or infiltrative malignancy. Mild right hydronephrosis due to compression of the ureter by the mass. Bibasilar pulmonary consolidation or atelectasis. Ct Head Wo Contrast    Result Date: 8/15/2020  EXAMINATION: CT OF THE HEAD WITHOUT CONTRAST 8/15/2020 6:09 pm TECHNIQUE: CT of the head was performed without the administration of intravenous contrast. Dose modulation, iterative reconstruction, and/or weight based adjustment of the mA/kV was utilized to reduce the radiation dose to as low as reasonably achievable. COMPARISON: Brain MRI 04/09/2018 HISTORY: ORDERING SYSTEM PROVIDED HISTORY: lethargic TECHNOLOGIST PROVIDED HISTORY: Reason for exam:->lethargic Has a \"code stroke\" or \"stroke alert\" been called? ->No Reason for Exam: lethargic Acuity: Acute Type of Exam: Initial FINDINGS: Patient motion in some areas, partially limiting evaluation of those areas. BRAIN/VENTRICLES:  No masses nor acute intracranial hemorrhage. Intact gray/white matter differentiation without findings of acute ischemia. No mass effect nor midline shift. Patent basilar cisterns and foramen magnum. No hydrocephalus. Age-appropriate mild to moderate diffuse atrophy. Mild to moderate subcortical, deep, and periventricular white matter hypodensities likely due to chronic small vessel ischemia with additional pontine involvement. ORBITS:  Bilateral lens implants. Otherwise normal without acute abnormality. SINUSES:  Normally pneumatized and aerated. SOFT TISSUES/SKULL:  No acute soft tissue abnormality. Moderate atherosclerotic calcifications. No acute fracture. 1. No acute intracranial abnormality. 2. Chronic changes as above. Assessment and Plan:   Sukhjinder Dias is a 79 y.o.  male  who presents with Pelvic mass    Pelvic abscess s/p IR assisted drainage  CT A/P with right pelvic mass with right hydronephrosis  CT urography with 5.5 x 4.7 cm collection in the right pelvis with midl peripheral enhancement extending into the right iliopsoas muscle as well as obstruction of the right ureter with hydroureteronephrosis. Also fluid filled thick walled structure extending from the cecum towards this collection raising suspicion for perforated appendicitis with associated abscess   Not sure about the origin ? Perforated appendicitis  Fluids appear to be purulent  Fluid cultures with GPC and Gram negative bacilli, Cytology pending  Blood cultures negative  Continue Meropenem   MRSA screening negative  General surgery recommendations appreciated  Urology recommendations appreciated  Discussed with Dr Corrina Pagan, plan to wait until the cytology report, if negative for malignancy can avoid surgery and can be discharged with oral antibiotics, needing 7 more days.   Has drain in right lower quadrant. Stable from  standpoint. Urology to sign off. Follow-up with Dr. Michell Simmons in 1 month. CRP 33. Procalcitonin 0.082.    -Awaiting for cytology results today. Rt upper extremity DVT  US DVT of the right upper arm DVT, Updated POA Teaster, will add oral on discharge  For now will change Lovenox to therapeutic    Acute metabolic encephalopathy-Improved  Likely from sepsis    Acute respiratory failure with Hypoxia-Improved  Pneumonia  COVID 19 test negative  Continue abx as above  Given  lasix   X ray with worsening bilateral airspace dz  CXR: Improvement in aeration of lungs. On room air. BNP improved to 359. SLIM -resolved  Likely from ATN with post obstructive uropathy  Hydronephrosis on kidney  Nephrology recommendations appreciated  Renal ultrasound: Unremarkable, no hydronephrosis. Moderate PCM  Dietary following, appreciated recommendations    Prostate cancer   S/p Prostatectomy    Anemia of Chronic disorders    Dementia with Parkinson's Dz      Discuss plan with patient's wife. Diet DIET GENERAL; Dysphagia Minced and Moist  Dietary Nutrition Supplements: Standard Oral Supplement   DVT Prophylaxis [x] Lovenox, []  Heparin, [] SCDs, [] Ambulation   GI Prophylaxis [] PPI,  [] H2 Blocker,  [] Carafate,  [x] Diet/Tube Feeds   Code Status Full Code   Disposition -awaiting cytology report and surgery clearance. Plan for Rowan 77.    MDM [] Low, [x] Moderate,[]  High      Electronically signed by Pamella Treviño MD on 8/27/2020 at 11:53 AM

## 2020-08-27 NOTE — PROGRESS NOTES
Nephrology Progress Note  8/27/2020 8:55 AM  Subjective:   Admit Date: 8/14/2020  PCP: August Baker MD     Interval History: more awake and interactive this am.     Diet: DIET GENERAL; Dysphagia Minced and Moist  Dietary Nutrition Supplements: Standard Oral Supplement    Data:   Scheduled Meds:   enoxaparin  1 mg/kg Subcutaneous BID    potassium chloride  20 mEq Intravenous Once    meropenem  1 g Intravenous Q8H    amLODIPine  5 mg Oral Daily    sodium chloride flush  5 mL Intracatheter BID    lidocaine  1 patch Transdermal Daily    amantadine  100 mg Oral BID    aspirin  81 mg Oral Daily    escitalopram  10 mg Oral Nightly    rivastigmine  1.5 mg Oral BID    rosuvastatin  40 mg Oral QAM    bicalutamide  50 mg Oral Daily    sodium chloride flush  10 mL Intravenous 2 times per day    carbidopa-levodopa  2 tablet Oral 4x Daily    And    entacapone  200 mg Oral 4x Daily     Continuous Infusions:    PRN Meds:cloNIDine, potassium chloride, sodium chloride flush, acetaminophen **OR** acetaminophen, polyethylene glycol, promethazine **OR** ondansetron, morphine  I/O last 3 completed shifts: In: 120 [P.O.:120]  Out: 1465 [Urine:1450; Drains:15]  No intake/output data recorded. Intake/Output Summary (Last 24 hours) at 8/27/2020 0855  Last data filed at 8/27/2020 0700  Gross per 24 hour   Intake 120 ml   Output 1465 ml   Net -1345 ml     CBC:   Recent Labs     08/25/20  0614 08/26/20  0525   WBC 6.5 7.2   HGB 10.7* 10.7*    326     BMP:    Recent Labs     08/25/20  0614 08/26/20  0525    138   K 4.3 4.2    102   CO2 31 28   BUN 19 17   CREATININE 1.1 1.0   GLUCOSE 93 89     Hepatic:   No results for input(s): AST, ALT, ALB, BILITOT, ALKPHOS in the last 72 hours. Troponin: No results for input(s): TROPONINI in the last 72 hours. BNP: No results for input(s): BNP in the last 72 hours. Lipids: No results for input(s): CHOL, HDL in the last 72 hours.     Invalid input(s): LDLCALCU  ABGs: No results found for: PHART, PO2ART, XAY2KFW  INR:   No results for input(s): INR in the last 72 hours. Renal Labs  Albumin:    Lab Results   Component Value Date    LABALBU 3.1 08/14/2020     Calcium:    Lab Results   Component Value Date    CALCIUM 9.3 08/26/2020     Phosphorus:  No results found for: PHOS  U/A:    Lab Results   Component Value Date    NITRU NEGATIVE 08/15/2020    COLORU YELLOW 08/15/2020    WBCUA 1 08/15/2020    RBCUA 6 08/15/2020    MUCUS RARE 08/15/2020    TRICHOMONAS NONE SEEN 08/15/2020    BACTERIA NEGATIVE 08/15/2020    CLARITYU CLEAR 08/15/2020    SPECGRAV 1.013 08/15/2020    UROBILINOGEN NORMAL 08/15/2020    BILIRUBINUR NEGATIVE 08/15/2020    BLOODU SMALL 08/15/2020    KETUA MODERATE 08/15/2020     ABG:  No results found for: PHART, TWL1RZW, PO2ART, PTO2YSS, BEART, THGBART, DCE3COH, A8VFLYJK  HgBA1c:  No results found for: LABA1C  Microalbumen/Creatinine ratio:  No components found for: RUCREAT    Objective:   Vitals: /72   Pulse 68   Temp 97.6 °F (36.4 °C) (Oral)   Resp 16   Ht 6' 0.99\" (1.854 m)   Wt 141 lb 8 oz (64.2 kg)   SpO2 96%   BMI 18.67 kg/m²      General appearance:  awake and verbally interactive; confused  HEENT: Head: normocephalic, atraumatic. Neck: supple, symmetrical, trachea midline  Cardiovascular: normal S1 and S2  Pulmonary: diminished lung sounds bilaterally   Abdomen:  soft / non-tender   Extremities: Trace edema to bilateral lower legs     -Right sided pelvic drain intact     Patient Active Problem List:     Weight loss, unintentional     History of colon polyps     Pelvic mass    Assessment and Plan:    IMP:  1 arf from atn/pra/hydro  2 right side mass fluid collection  -right sided drain placement     3 hx prostate ca  4 HTN   5 protein malnutrtion   6. Hypokalemia   7.  Anemia     Plan     1.   -uop: 1.45 liters in the last 24 hours via jones   -renal function panel Qam   2   -followed by Dr. Caitlin Pearson who has been  Awaiting pathology

## 2020-08-27 NOTE — PROGRESS NOTES
GENERAL SURGERY PROGRESS NOTE    CC/HPI:           Patient feels better and eating OK. Vitals:    08/26/20 1845 08/27/20 0400 08/27/20 0849 08/27/20 1234   BP: 118/61 (!) 105/59 136/72    Pulse: 68 59 68    Resp: 16 16 16    Temp: 98.2 °F (36.8 °C) 97.8 °F (36.6 °C) 97.6 °F (36.4 °C)    TempSrc: Oral Oral Oral    SpO2: 98% 99% 96% 96%   Weight:  141 lb 8 oz (64.2 kg)     Height:         I/O last 3 completed shifts: In: 120 [P.O.:120]  Out: 1465 [Urine:1450; Drains:15]  No intake/output data recorded.     DIET GENERAL; Dysphagia Minced and Moist  Dietary Nutrition Supplements: Standard Oral Supplement    Recent Results (from the past 48 hour(s))   Magnesium    Collection Time: 08/26/20  5:25 AM   Result Value Ref Range    Magnesium 2.1 1.8 - 2.4 mg/dl   C-Reactive Protein    Collection Time: 08/26/20  5:25 AM   Result Value Ref Range    CRP, High Sensitivity 33.9 mg/L   Brain Natriuretic Peptide    Collection Time: 08/26/20  5:25 AM   Result Value Ref Range    Pro-.0 (H) <300 PG/ML   Procalcitonin    Collection Time: 08/26/20  5:25 AM   Result Value Ref Range    Procalcitonin 1.395    Basic Metabolic Panel    Collection Time: 08/26/20  5:25 AM   Result Value Ref Range    Sodium 138 135 - 145 MMOL/L    Potassium 4.2 3.5 - 5.1 MMOL/L    Chloride 102 99 - 110 mMol/L    CO2 28 21 - 32 MMOL/L    Anion Gap 8 4 - 16    BUN 17 6 - 23 MG/DL    CREATININE 1.0 0.9 - 1.3 MG/DL    Glucose 89 70 - 99 MG/DL    Calcium 9.3 8.3 - 10.6 MG/DL    GFR Non-African American >60 >60 mL/min/1.73m2    GFR African American >60 >60 mL/min/1.73m2   CBC    Collection Time: 08/26/20  5:25 AM   Result Value Ref Range    WBC 7.2 4.0 - 10.5 K/CU MM    RBC 3.57 (L) 4.6 - 6.2 M/CU MM    Hemoglobin 10.7 (L) 13.5 - 18.0 GM/DL    Hematocrit 34.9 (L) 42 - 52 %    MCV 97.8 78 - 100 FL    MCH 30.0 27 - 31 PG    MCHC 30.7 (L) 32.0 - 36.0 %    RDW 14.3 11.7 - 14.9 %    Platelets 442 553 - 908 K/CU MM    MPV 9.4 7.5 - 11.1 FL   Magnesium Collection Time: 08/27/20  4:16 AM   Result Value Ref Range    Magnesium 2.3 1.8 - 2.4 mg/dl       Scheduled Meds:   enoxaparin  1 mg/kg Subcutaneous BID    potassium chloride  20 mEq Intravenous Once    meropenem  1 g Intravenous Q8H    amLODIPine  5 mg Oral Daily    sodium chloride flush  5 mL Intracatheter BID    lidocaine  1 patch Transdermal Daily    amantadine  100 mg Oral BID    aspirin  81 mg Oral Daily    escitalopram  10 mg Oral Nightly    rivastigmine  1.5 mg Oral BID    rosuvastatin  40 mg Oral QAM    bicalutamide  50 mg Oral Daily    sodium chloride flush  10 mL Intravenous 2 times per day    carbidopa-levodopa  2 tablet Oral 4x Daily    And    entacapone  200 mg Oral 4x Daily       Continuous Infusions:      Physical Exam:  HEENT: Anicteric sclerae, Oropharyngeal mucosae moist, pink and intact. Heart:  Normal S1 and S2, RRR  Lungs: Clear to auscultation bilaterally, No audible Wheezes or Rales. Extremities: No edema. Neuro: Alert and Oriented x 3, Non focal.  Abdomen: Soft, Benign, not MILDLY tender lower abdomen. ., Non distended, Positive bowel sounds. Principal Problem:    Pelvic mass  Resolved Problems:    * No resolved hospital problems. *      Assessment and Plan:  CT the day before the day before yesterday:  Impression    Right pelvic mass.  Differential diagnosis includes subacute hematoma or    infiltrative malignancy.         Mild right hydronephrosis due to compression of the ureter by the mass.         Bibasilar pulmonary consolidation or atelectasis.       CT the day before yesterday:  Impression    1.  There is a complex 5.5 x 4.7 cm collection in the right pelvis with    surrounding mild peripheral enhancement extends into the right iliopsoas    muscle and likely abuts and encases the right external iliac vessels. Vonzell Laird    is also obstruction of the right ureter resulting in mild    hydroureteronephrosis. Vonzell Laird is a fluid-filled thick-walled structure    extending from the cecum towards this collection raising suspicion for    perforated appendicitis with associated phlegmon/abscess.  Less likely    considerations include hematoma or malignancy either metastatic disease or    lymphoma. 2. Status post prostatectomy.  Otherwise no obvious urinary tract filling    defect or calculus given limitations due to motion artifact. Results were called by Dr. Urvashi Lopez. Daniel Ingram MD to Dorcas Santana on    8/18/2020 at 14:15. The day before Yesterday's IR:  Impression    1.  Technically successful placement of an 8 German drain in the right lower    quadrant collection.         2.  Aspiration of purulent material supports the diagnosis of ruptured    appendicitis with abscess\phlegmon, though continued attention on follow-up    is recommended.         RECOMMENDATIONS:    1.  Drain to bulb drainage.         2.  Flush right lower quadrant drain with 2-5 cc normal saline b.i.d.         3.  Record daily drain output. Diagnosis:      Patient Active Problem List   Diagnosis    Weight loss, unintentional    History of colon polyps    Pelvic mass        Assessment & plan:  Angélica Sofia is a very pleasant 79 y.o. male presenting with a pelvic mass,  Perforated appendicitis, final cytology still pending. Discussed with pathologist, and they are working more stains, (for concerning finding? ). .     Percutaneous biopsy was done last week, and sent to the lab and pathology for cytology I asked IR to. . I'll manage according to its result. Due to his EXTREME Fragility. . ONLY if neoplastic would require surgery, but if it is ONLY perforated appendicitis, will hopefully manage non operatively. [facility] De La [facility] CYTOLOGY STILL PENDING, after 7 days. .. awaiting result to decide surgery plan of action.       ___________________________________________    Tree Yeung MD, FACS, FICS  8/27/2020  2:11 PM

## 2020-08-27 NOTE — PLAN OF CARE
Problem: Falls - Risk of:  Goal: Will remain free from falls  Description: Will remain free from falls  8/27/2020 1506 by Jacqueline Chisholm RN  Outcome: Ongoing  8/27/2020 0325 by Pippa Ruiz RN  Outcome: Ongoing  Goal: Absence of physical injury  Description: Absence of physical injury  8/27/2020 1506 by Jacqueline Chisholm RN  Outcome: Ongoing  8/27/2020 0325 by Pippa Ruiz RN  Outcome: Ongoing

## 2020-08-28 ENCOUNTER — APPOINTMENT (OUTPATIENT)
Dept: ULTRASOUND IMAGING | Age: 80
DRG: 371 | End: 2020-08-28
Payer: MEDICARE

## 2020-08-28 ENCOUNTER — APPOINTMENT (OUTPATIENT)
Dept: INTERVENTIONAL RADIOLOGY/VASCULAR | Age: 80
DRG: 371 | End: 2020-08-28
Payer: MEDICARE

## 2020-08-28 ENCOUNTER — APPOINTMENT (OUTPATIENT)
Dept: MRI IMAGING | Age: 80
DRG: 371 | End: 2020-08-28
Payer: MEDICARE

## 2020-08-28 LAB
ALBUMIN SERPL-MCNC: 3.3 GM/DL (ref 3.4–5)
ANION GAP SERPL CALCULATED.3IONS-SCNC: 11 MMOL/L (ref 4–16)
BUN BLDV-MCNC: 17 MG/DL (ref 6–23)
CALCIUM SERPL-MCNC: 10 MG/DL (ref 8.3–10.6)
CHLORIDE BLD-SCNC: 101 MMOL/L (ref 99–110)
CO2: 26 MMOL/L (ref 21–32)
CREAT SERPL-MCNC: 1 MG/DL (ref 0.9–1.3)
GFR AFRICAN AMERICAN: >60 ML/MIN/1.73M2
GFR NON-AFRICAN AMERICAN: >60 ML/MIN/1.73M2
GLUCOSE BLD-MCNC: 87 MG/DL (ref 70–99)
INR BLD: 1.34 INDEX
PHOSPHORUS: 2.6 MG/DL (ref 2.5–4.9)
POTASSIUM SERPL-SCNC: 4.4 MMOL/L (ref 3.5–5.1)
PROTHROMBIN TIME: 16.3 SECONDS (ref 11.7–14.5)
SODIUM BLD-SCNC: 138 MMOL/L (ref 135–145)

## 2020-08-28 PROCEDURE — 6360000002 HC RX W HCPCS: Performed by: INTERNAL MEDICINE

## 2020-08-28 PROCEDURE — 2580000003 HC RX 258: Performed by: INTERNAL MEDICINE

## 2020-08-28 PROCEDURE — 97535 SELF CARE MNGMENT TRAINING: CPT

## 2020-08-28 PROCEDURE — 76080 X-RAY EXAM OF FISTULA: CPT

## 2020-08-28 PROCEDURE — 6370000000 HC RX 637 (ALT 250 FOR IP): Performed by: NURSE PRACTITIONER

## 2020-08-28 PROCEDURE — 85610 PROTHROMBIN TIME: CPT

## 2020-08-28 PROCEDURE — 36415 COLL VENOUS BLD VENIPUNCTURE: CPT

## 2020-08-28 PROCEDURE — 6370000000 HC RX 637 (ALT 250 FOR IP): Performed by: INTERNAL MEDICINE

## 2020-08-28 PROCEDURE — 80069 RENAL FUNCTION PANEL: CPT

## 2020-08-28 PROCEDURE — 6370000000 HC RX 637 (ALT 250 FOR IP): Performed by: PSYCHIATRY & NEUROLOGY

## 2020-08-28 PROCEDURE — 94761 N-INVAS EAR/PLS OXIMETRY MLT: CPT

## 2020-08-28 PROCEDURE — 6370000000 HC RX 637 (ALT 250 FOR IP): Performed by: HOSPITALIST

## 2020-08-28 PROCEDURE — 6360000004 HC RX CONTRAST MEDICATION: Performed by: HOSPITALIST

## 2020-08-28 PROCEDURE — 97530 THERAPEUTIC ACTIVITIES: CPT

## 2020-08-28 PROCEDURE — 2709999900 HC NON-CHARGEABLE SUPPLY

## 2020-08-28 PROCEDURE — 93880 EXTRACRANIAL BILAT STUDY: CPT

## 2020-08-28 PROCEDURE — 99232 SBSQ HOSP IP/OBS MODERATE 35: CPT | Performed by: SURGERY

## 2020-08-28 PROCEDURE — 49424 ASSESS CYST CONTRAST INJECT: CPT

## 2020-08-28 PROCEDURE — 97112 NEUROMUSCULAR REEDUCATION: CPT

## 2020-08-28 PROCEDURE — 97116 GAIT TRAINING THERAPY: CPT

## 2020-08-28 PROCEDURE — 0WPGX3Z REMOVAL OF INFUSION DEVICE FROM PERITONEAL CAVITY, EXTERNAL APPROACH: ICD-10-PCS | Performed by: RADIOLOGY

## 2020-08-28 PROCEDURE — 1200000000 HC SEMI PRIVATE

## 2020-08-28 RX ORDER — CLOPIDOGREL BISULFATE 75 MG/1
75 TABLET ORAL DAILY
Status: DISCONTINUED | OUTPATIENT
Start: 2020-08-28 | End: 2020-08-30 | Stop reason: HOSPADM

## 2020-08-28 RX ORDER — WARFARIN SODIUM 5 MG/1
5 TABLET ORAL DAILY
Status: DISCONTINUED | OUTPATIENT
Start: 2020-08-28 | End: 2020-08-28

## 2020-08-28 RX ORDER — WARFARIN SODIUM 5 MG/1
5 TABLET ORAL DAILY
Status: DISCONTINUED | OUTPATIENT
Start: 2020-08-28 | End: 2020-08-30

## 2020-08-28 RX ADMIN — ENTACAPONE 200 MG: 200 TABLET, FILM COATED ORAL at 13:34

## 2020-08-28 RX ADMIN — ASPIRIN 81 MG CHEWABLE TABLET 81 MG: 81 TABLET CHEWABLE at 08:11

## 2020-08-28 RX ADMIN — ENTACAPONE 200 MG: 200 TABLET, FILM COATED ORAL at 17:06

## 2020-08-28 RX ADMIN — BICALUTAMIDE 50 MG: 50 TABLET ORAL at 08:14

## 2020-08-28 RX ADMIN — ENTACAPONE 200 MG: 200 TABLET, FILM COATED ORAL at 08:14

## 2020-08-28 RX ADMIN — CARBIDOPA AND LEVODOPA 2 TABLET: 25; 100 TABLET ORAL at 17:06

## 2020-08-28 RX ADMIN — ESCITALOPRAM OXALATE 10 MG: 10 TABLET ORAL at 20:45

## 2020-08-28 RX ADMIN — SODIUM CHLORIDE, PRESERVATIVE FREE 10 ML: 5 INJECTION INTRAVENOUS at 20:45

## 2020-08-28 RX ADMIN — ENTACAPONE 200 MG: 200 TABLET, FILM COATED ORAL at 20:47

## 2020-08-28 RX ADMIN — ENOXAPARIN SODIUM 70 MG: 80 INJECTION SUBCUTANEOUS at 20:45

## 2020-08-28 RX ADMIN — ENOXAPARIN SODIUM 70 MG: 80 INJECTION SUBCUTANEOUS at 08:11

## 2020-08-28 RX ADMIN — AMANTADINE HYDROCHLORIDE 100 MG: 100 CAPSULE, LIQUID FILLED ORAL at 20:45

## 2020-08-28 RX ADMIN — AMANTADINE HYDROCHLORIDE 100 MG: 100 CAPSULE, LIQUID FILLED ORAL at 08:19

## 2020-08-28 RX ADMIN — AMLODIPINE BESYLATE 5 MG: 5 TABLET ORAL at 08:11

## 2020-08-28 RX ADMIN — CARBIDOPA AND LEVODOPA 2 TABLET: 25; 100 TABLET ORAL at 08:11

## 2020-08-28 RX ADMIN — RIVASTIGMINE TARTRATE 1.5 MG: 1.5 CAPSULE ORAL at 20:45

## 2020-08-28 RX ADMIN — CLOPIDOGREL BISULFATE 75 MG: 75 TABLET ORAL at 17:06

## 2020-08-28 RX ADMIN — SODIUM CHLORIDE, PRESERVATIVE FREE 10 ML: 5 INJECTION INTRAVENOUS at 08:16

## 2020-08-28 RX ADMIN — MEROPENEM 1 G: 1 INJECTION, POWDER, FOR SOLUTION INTRAVENOUS at 18:14

## 2020-08-28 RX ADMIN — RIVASTIGMINE TARTRATE 1.5 MG: 1.5 CAPSULE ORAL at 08:16

## 2020-08-28 RX ADMIN — CARBIDOPA AND LEVODOPA 2 TABLET: 25; 100 TABLET ORAL at 20:45

## 2020-08-28 RX ADMIN — ROSUVASTATIN 40 MG: 40 TABLET, FILM COATED ORAL at 08:11

## 2020-08-28 RX ADMIN — CARBIDOPA AND LEVODOPA 2 TABLET: 25; 100 TABLET ORAL at 13:34

## 2020-08-28 RX ADMIN — MEROPENEM 1 G: 1 INJECTION, POWDER, FOR SOLUTION INTRAVENOUS at 03:52

## 2020-08-28 RX ADMIN — MEROPENEM 1 G: 1 INJECTION, POWDER, FOR SOLUTION INTRAVENOUS at 10:37

## 2020-08-28 RX ADMIN — WARFARIN SODIUM 5 MG: 5 TABLET ORAL at 18:13

## 2020-08-28 RX ADMIN — IOHEXOL 5 ML: 180 INJECTION INTRAVENOUS at 12:16

## 2020-08-28 ASSESSMENT — PAIN SCALES - GENERAL
PAINLEVEL_OUTOF10: 0
PAINLEVEL_OUTOF10: 0

## 2020-08-28 NOTE — PROGRESS NOTES
Physical Therapy    Physical Therapy Treatment Note  Name: Garima Sheridan MRN: [id number] :   1940   Date:  2020   Admission Date: 2020 Room:  [address] [place] [address]   Restrictions/Precautions:        general precautions, falls   Communication with other providers:  OT   Subjective:  Patient states:  \"I need my shoes\"   Pain:   Location, Type, Intensity (0/10 to 10/10): Denies   Objective:    Observation:    Supine in bed. Agreeable and cooperative to work with therapy   Treatment, including education/measures:  Supine to sit: modA for partial guidance of LEs and uprighting trunk. Slow pace, rigid. Cues for sequencing. Sit to stand: Geovanny from EOB, modA from toilet and recliner. Needed lift and anterior weight shift assist. Inc difficulty from lower seat surface. VCs for weight shift and \"big\" push through LEs. Stand to sit: modA to toilet, maxA to recliner (impulsively sat), and Geovanny to recliner on second trial.   Step pivot: modA with RW (2x). Very difficult to sequence turn with RW. Demonstrated increased freezing episodes with shuffled gait. Step by step VC/TCs for step sequencing and assistance with RW. Ambulation: ~10ft + 3ft with RW min to modA at times for steadying and heavy weight shift facilitation. Tactile and verbal cues for step sequencing. Needed cues for \"big\" steps and at times visual targets. Fwd posture, shuffled gait, slow pace, multiple freezing episodes. Assistance with positioning of Rw. Standing balance: Geovanny at sink performing oral hygiene tasks x 1 minute. See OT note for ADL details. Quick fatigue starting to sit needing to have chair brought up behind him. Slightly posterior needing tactile cues for fwd weight shift over CARLOS  Sitting balance: EOB static CGA progressing to SBA. EOB dynamic reaching activity using BUEs incorporating lateral, fwd, high, low, and diagonal reaches to targets. Somewhat limited with high reaching due to limited shoulder ROM. CGA to SBA.  X 10 minutes   Educated pt on POC, role of PT, DME use. Assessment / Impression:    Patient's tolerance of treatment:  Good    Adverse Reaction: no  Significant change in status and impact:  Continues to progress with mobility tolerance and indep   Barriers to improvement:  Activity tolerance, strength, balance, PD   Plan for Next Session:    Activity tolerance, strength, balance, gait, transfers, bed mobility.    Time in:  1418  Time out:  1458  Timed treatment minutes:  40  Total treatment time:  40    Previously filed items:     Short term goals  Time Frame for Short term goals: 1 week  Short term goal 1: Pt will roll bilat modA  Short term goal 2: Pt will transition sit><supine modA  Short term goal 3: Pt will transfer between surfaces maxA  Short term goal 4: Pt will perform light dynamic sitting activity with single UE support x 5 minutes CGA       Electronically signed by:    Andres Tierney [id number]  8/28/2020, 3:58 PM

## 2020-08-28 NOTE — PROGRESS NOTES
GENERAL SURGERY PROGRESS NOTE    CC/HPI:           Patient feels better and eating OK. Vitals:    08/27/20 1234 08/27/20 1550 08/27/20 2015 08/28/20 0345   BP:  93/63 117/68 114/63   Pulse:  70 63 58   Resp:  17 16 16   Temp:  98 °F (36.7 °C) 98.3 °F (36.8 °C) 96.9 °F (36.1 °C)   TempSrc:  Oral Oral Oral   SpO2: 96% 96%     Weight:       Height:         I/O last 3 completed shifts: In: 620 [P.O.:620]  Out: 1180 [Urine:1150; Drains:30]  No intake/output data recorded.     DIET GENERAL; Dysphagia Minced and Moist  Dietary Nutrition Supplements: Standard Oral Supplement    Recent Results (from the past 48 hour(s))   Magnesium    Collection Time: 08/27/20  4:16 AM   Result Value Ref Range    Magnesium 2.3 1.8 - 2.4 mg/dl   Renal Function Panel    Collection Time: 08/28/20  4:13 AM   Result Value Ref Range    Sodium 138 135 - 145 MMOL/L    Potassium 4.4 3.5 - 5.1 MMOL/L    Chloride 101 99 - 110 mMol/L    CO2 26 21 - 32 MMOL/L    Anion Gap 11 4 - 16    BUN 17 6 - 23 MG/DL    CREATININE 1.0 0.9 - 1.3 MG/DL    Glucose 87 70 - 99 MG/DL    Calcium 10.0 8.3 - 10.6 MG/DL    GFR Non-African American >60 >60 mL/min/1.73m2    GFR African American >60 >60 mL/min/1.73m2    Alb 3.3 (L) 3.4 - 5.0 GM/DL    Phosphorus 2.6 2.5 - 4.9 MG/DL       Scheduled Meds:   enoxaparin  1 mg/kg Subcutaneous BID    potassium chloride  20 mEq Intravenous Once    meropenem  1 g Intravenous Q8H    amLODIPine  5 mg Oral Daily    sodium chloride flush  5 mL Intracatheter BID    lidocaine  1 patch Transdermal Daily    amantadine  100 mg Oral BID    aspirin  81 mg Oral Daily    escitalopram  10 mg Oral Nightly    rivastigmine  1.5 mg Oral BID    rosuvastatin  40 mg Oral QAM    bicalutamide  50 mg Oral Daily    sodium chloride flush  10 mL Intravenous 2 times per day    carbidopa-levodopa  2 tablet Oral 4x Daily    And    entacapone  200 mg Oral 4x Daily       Continuous Infusions:      Physical Exam:  HEENT: Anicteric sclerae, Oropharyngeal mucosae moist, pink and intact. Heart:  Normal S1 and S2, RRR  Lungs: Clear to auscultation bilaterally, No audible Wheezes or Rales. Extremities: No edema. Neuro: Alert and Oriented x 3, Non focal.  Abdomen: Soft, Benign, not MILDLY tender lower abdomen. ., Non distended, Positive bowel sounds. Principal Problem:    Pelvic mass  Resolved Problems:    * No resolved hospital problems. *      Assessment and Plan:  CT:  Impression    Right pelvic mass.  Differential diagnosis includes subacute hematoma or    infiltrative malignancy.         Mild right hydronephrosis due to compression of the ureter by the mass.         Bibasilar pulmonary consolidation or atelectasis.       CT:  Impression    1. There is a complex 5.5 x 4.7 cm collection in the right pelvis with    surrounding mild peripheral enhancement extends into the right iliopsoas    muscle and likely abuts and encases the right external iliac vessels. Lorilee Anne    is also obstruction of the right ureter resulting in mild    hydroureteronephrosis. Lorilee Anne is a fluid-filled thick-walled structure    extending from the cecum towards this collection raising suspicion for    perforated appendicitis with associated phlegmon/abscess.  Less likely    considerations include hematoma or malignancy either metastatic disease or    lymphoma. 2. Status post prostatectomy.  Otherwise no obvious urinary tract filling    defect or calculus given limitations due to motion artifact. Results were called by Dr. Urvashi Lopez. Daniel Ingram MD to Dorcas Santana on    8/18/2020 at 14:15.       IR:  Impression    1.  Technically successful placement of an 8 Nepali drain in the right lower    quadrant collection.         2.  Aspiration of purulent material supports the diagnosis of ruptured    appendicitis with abscess\phlegmon, though continued attention on follow-up    is recommended.         RECOMMENDATIONS:    1.  Drain to bulb drainage.         2.  Flush right lower quadrant drain with 2-5 cc normal saline b.i.d.         3.  Record daily drain output.           Diagnosis:      Patient Active Problem List   Diagnosis    Weight loss, unintentional    History of colon polyps    Pelvic mass        Assessment & plan:  Oswald Parish is a very pleasant 79 y.o. male presenting with a pelvic mass,  Perforated appendicitis, final cytology:    Final Cytologic Diagnosis:   Right Lower Quadrant Pelvic Abscess Fluid cytology with cell   block:   -     Rare degenerated atypical cells, favor benign    MAY be discharged dc drain and continue Antibiotics, and will follow in clinic next week.    ___________________________________________    Sofia Brar MD, FACS, FICS  8/28/2020  7:15 AM

## 2020-08-28 NOTE — PROGRESS NOTES
Nephrology Progress Note  8/28/2020 11:23 AM  Subjective:   Admit Date: 8/14/2020  PCP: Jett Spann MD  Interval History: arousable weak and still with drain    Diet: DIET GENERAL; Dysphagia Minced and Moist  Dietary Nutrition Supplements: Standard Oral Supplement  Pain is:None      Data:   Scheduled Meds:   enoxaparin  1 mg/kg Subcutaneous BID    potassium chloride  20 mEq Intravenous Once    meropenem  1 g Intravenous Q8H    amLODIPine  5 mg Oral Daily    sodium chloride flush  5 mL Intracatheter BID    lidocaine  1 patch Transdermal Daily    amantadine  100 mg Oral BID    aspirin  81 mg Oral Daily    escitalopram  10 mg Oral Nightly    rivastigmine  1.5 mg Oral BID    rosuvastatin  40 mg Oral QAM    bicalutamide  50 mg Oral Daily    sodium chloride flush  10 mL Intravenous 2 times per day    carbidopa-levodopa  2 tablet Oral 4x Daily    And    entacapone  200 mg Oral 4x Daily     Continuous Infusions:    PRN Meds:cloNIDine, potassium chloride, sodium chloride flush, acetaminophen **OR** acetaminophen, polyethylene glycol, promethazine **OR** ondansetron, morphine  I/O last 3 completed shifts: In: 620 [P.O.:620]  Out: 1180 [Urine:1150; Drains:30]  I/O this shift: In: 79 [P.O.:60; I.V.:10]  Out: 300 [Urine:300]    Intake/Output Summary (Last 24 hours) at 8/28/2020 1123  Last data filed at 8/28/2020 1120  Gross per 24 hour   Intake 390 ml   Output 1480 ml   Net -1090 ml     CBC:   Recent Labs     08/26/20  0525   WBC 7.2   HGB 10.7*        BMP:    Recent Labs     08/26/20  0525 08/28/20  0413    138   K 4.2 4.4    101   CO2 28 26   BUN 17 17   CREATININE 1.0 1.0   GLUCOSE 89 87     Hepatic:   No results for input(s): AST, ALT, ALB, BILITOT, ALKPHOS in the last 72 hours. Troponin: No results for input(s): TROPONINI in the last 72 hours. BNP: No results for input(s): BNP in the last 72 hours. Lipids: No results for input(s): CHOL, HDL in the last 72 hours.     Invalid input(s): LDLCALCU  ABGs: No results found for: PHART, PO2ART, WTF8VDM  INR:   No results for input(s): INR in the last 72 hours. Renal Labs  Albumin:    Lab Results   Component Value Date    LABALBU 3.3 08/28/2020     Calcium:    Lab Results   Component Value Date    CALCIUM 10.0 08/28/2020     Phosphorus:    Lab Results   Component Value Date    PHOS 2.6 08/28/2020     U/A:    Lab Results   Component Value Date    NITRU NEGATIVE 08/15/2020    COLORU YELLOW 08/15/2020    WBCUA 1 08/15/2020    RBCUA 6 08/15/2020    MUCUS RARE 08/15/2020    TRICHOMONAS NONE SEEN 08/15/2020    BACTERIA NEGATIVE 08/15/2020    CLARITYU CLEAR 08/15/2020    SPECGRAV 1.013 08/15/2020    UROBILINOGEN NORMAL 08/15/2020    BILIRUBINUR NEGATIVE 08/15/2020    BLOODU SMALL 08/15/2020    KETUA MODERATE 08/15/2020     ABG:  No results found for: PHART, DZQ5SKH, PO2ART, IBU8SFH, BEART, THGBART, RQJ3YTQ, X6LJSVDE  HgBA1c:  No results found for: LABA1C  Microalbumen/Creatinine ratio:  No components found for: RUCREAT          Objective:   Vitals: /65   Pulse 67   Temp 97.5 °F (36.4 °C) (Oral)   Resp 16   Ht 6' 0.99\" (1.854 m)   Wt 141 lb 8 oz (64.2 kg)   SpO2 100%   BMI 18.67 kg/m²   General appearance: awake weak confused  HEENT: Head: Normal, normocephalic, atraumatic.   Neck: supple, symmetrical, trachea midline  Lungs: diminished breath sounds bilaterally  Heart: S1, S2 normal  Abdomen: abnormal findings:  soft nt drain bloody  Extremities: edema trace  Neurologic: Mental status: alertness: awake confused      Patient Active Problem List:     Weight loss, unintentional     History of colon polyps     Pelvic mass    Assessment and Plan:      IMP:  1 arf from atn/pra/hydro  2 right side mass fluid collection  3 hx prostate ca  4 hyponatremia  5 protein malnutrtion      Plan     1 resolved arf and monitor  2 sp drain and on abx, fu surgery rec and plan dc drain  3 remission cancer  4 na stable  5 trying take po with help Shyla Barakat MD

## 2020-08-28 NOTE — PROGRESS NOTES
Occupational Therapy    Occupational Therapy Treatment Note  Name: Erica Urbano MRN: [id number] :   1940   Date:  2020   Admission Date: 2020 Room:  [address] [place] [address]   Restrictions/Precautions:    General Precautions, Fall Risk, Chemo Precautions  Communication with other providers:  PT, RN    Subjective:  Patient states: Agreeable to therapy  Pain: Pt denied pain this date. Objective:    Observation:  Pt was received supine in bed upon arrival.   Objective Measures:  Vitals stable throughout session. Treatment, including education:  Self Care Training:   Cues were given for safety, sequence, UE/LE placement, visual cues, and balance. Activities performed today included dressing, toileting, and grooming. Therapeutic Activity Training:   Therapeutic activity training was instructed today. Cues were given for safety, sequence, UE/LE placement, awareness, and balance. Activities performed today included bed mobility training, sup-sit, sit-stand, ambulation. Pt performed supine to seated bed mobility with ModA for leg positioning and trunk support. Pt sat upright demo fair+ sitting balance with heavy reliance on BUE. While seated at EOB, pt dependently doffed socks and donned shoes. Pt performed STS from EOB with Rajinder and RW. Pt slowly ambulated to the bathroom with RW, demo slow pace, shuffling gait, and required VC to continue through task. Pt required MaxVC for positioning in front of the toilet before performing toilet transfer. Pt performed stand to sit to toilet with Rajinder. Pt attempted to perform madeleine care in seated, however required assist to complete thoroughly in standing with RW. Pt performed STS from toilet with Rajinder. Pt brushed teeth at sink requiring assistance to open toothpaste and to humphrey toothpaste onto brush. Pt was able to brush teeth with VC to continue through and Rajinder to maintain upright posture.  Pt reports feeling fatigued and took approx 4 steps from sink to chair with VC to continue through task of walking. Pt prematurely sat in chair and required [address] [employer] [address] for positioning and controlled sit. While in seated pt required MaxA to thread depend/shorts and pull up to above knees. Pt performed STS from chair with CGA and in standing pt pulled up depend and shorts with ModA. Pt performed stand to sit to chair with VC for positioning. Pt was left seated upright in chair, alarm in place, all needs met. Assessment / Impression:     Patient's tolerance of treatment:  Good   Adverse Reaction: None  Significant change in status and impact: None  Barriers to improvement:  PD, strength, activity tolerance    Plan for Next Session:    Continue with POC    Time in:  1418  Time out:  1458  Timed treatment minutes:  40  Total treatment time:  40    Electronically signed by:     Joselyn Trinidad,   8/28/2020, 2:20 PM    Previously filed values:    Goals:  Pt goal: go home  Time Frame for STGs: discharge  Goal 1: Pt will perform UE ADLs Supervision  Goal 2: Pt will perform LE ADLs Geovanny w/ AD  Goal 3: Pt will perform toileting Geovanny  Goal 4: Pt will perform functional transfer w/ AD Geovanny  Goal 5: Pt will perform functional mobility w/ AD Geovanny  Goal 6: Pt will perform therex/theract in order to increase functional activity tolerance and dynamic standing balance

## 2020-08-28 NOTE — CONSULTS
(CRESTOR) tablet 40 mg, 40 mg, Oral, QAM  bicalutamide (CASODEX) chemo tablet 50 mg, 50 mg, Oral, Daily  sodium chloride flush 0.9 % injection 10 mL, 10 mL, Intravenous, 2 times per day  sodium chloride flush 0.9 % injection 10 mL, 10 mL, Intravenous, PRN  acetaminophen (TYLENOL) tablet 650 mg, 650 mg, Oral, Q6H PRN **OR** acetaminophen (TYLENOL) suppository 650 mg, 650 mg, Rectal, Q6H PRN  polyethylene glycol (GLYCOLAX) packet 17 g, 17 g, Oral, Daily PRN  promethazine (PHENERGAN) tablet 12.5 mg, 12.5 mg, Oral, Q6H PRN **OR** ondansetron (ZOFRAN) injection 4 mg, 4 mg, Intravenous, Q6H PRN  carbidopa-levodopa (SINEMET)  MG per tablet 2 tablet, 2 tablet, Oral, 4x Daily **AND** entacapone (COMTAN) tablet 200 mg, 200 mg, Oral, 4x Daily  morphine (PF) injection 2 mg, 2 mg, Intravenous, Q4H PRN  Allergies:  Patient has no known allergies. Social History:  TOBACCO:   reports that he has never smoked. He has never used smokeless tobacco.  ETOH:   reports current alcohol use. DRUGS:   reports no history of drug use. Family History:   History reviewed. No pertinent family history. REVIEW OF SYSTEMS:  CONSTITUTIONAL:  negative  HEENT:  negative  RESPIRATORY:  negative  CARDIOVASCULAR:  negative  GASTROINTESTINAL:  negative  GENITOURINARY:  negative  MUSCULOSKELETAL:  negative  BEHAVIOR/PSYCH:  Negative    PHYSICAL EXAM  ------------------------  Vitals:  /65   Pulse 67   Temp 97.5 °F (36.4 °C) (Oral)   Resp 16   Ht 6' 1\" (1.854 m)   Wt 141 lb 8 oz (64.2 kg)   SpO2 100%   BMI 18.67 kg/m²      General:  Awake, alert, oriented X 3. Well developed, well nourished, well groomed. No apparent distress. HEENT:  Normocephalic, atraumatic. Pupils equal, round, reactive to light. No scleral icterus. No conjunctival injection. Normal lips, teeth, and gums. No nasal discharge.   Neck:  Supple  Heart:  RRR, no murmurs, gallops, rubs  Lungs:  CTA bilaterally, bilat symmetrical expansion, no wheeze, rales, or rhonchi  Abdomen: Bowel sounds present, soft, nontender, no masses, no organomegaly, no peritoneal signs  Extremities:  No clubbing, cyanosis, or edema  Skin:  Warm and dry, no open lesions or rash  Breast: deferred  Rectal: deferred  Genitalia:  deferred    NEUROLOGICAL EXAM  ---------------------------------    Mental Status Exam:             Alert and oriented times three,follows commands,speech and language intact    Cranial [id number] Intact.         Cranial nerve II           Visual acuity:  normal                 Cranial nerve III           Pupils:  equal, round, reactive to light      Cranial nerves III, IV, VI           Extraocular Movements: intact      Cranial nerve V           Facial sensation:  intact      Cranial nerve VII           Facial strength: intact      Cranial nerve VIII           Hearing:  intact      Cranial nerve IX           Palate:  intact      Cranial nerve XI         Shoulder shrug:  intact      Cranial nerve XII          Tongue movement:  normal    Motor:    Drift:  absent  Motor exam is symmetrical 5- out of 5 all extremities bilaterally  Tone:  Cogwheel rigidity mild  Abnormal Movements:  Absent    DTRs-1+ biceps,triceps,brachioradialis,knee jerks and ankle jerks bilaterally symmetrical.  Toes-downgoing bilaterally            Sensory:normal sensation              CBC with Differential:    Lab Results   Component Value Date    WBC 7.2 08/26/2020    RBC 3.57 08/26/2020    HGB 10.7 08/26/2020    HCT 34.9 08/26/2020     08/26/2020    MCV 97.8 08/26/2020    MCH 30.0 08/26/2020    MCHC 30.7 08/26/2020    RDW 14.3 08/26/2020    SEGSPCT 64.7 08/25/2020    LYMPHOPCT 23.2 08/25/2020    MONOPCT 7.4 08/25/2020    BASOPCT 0.8 08/25/2020    MONOSABS 0.5 08/25/2020    LYMPHSABS 1.5 08/25/2020    EOSABS 0.2 08/25/2020    BASOSABS 0.1 08/25/2020    DIFFTYPE AUTOMATED DIFFERENTIAL 08/25/2020     CMP:    Lab Results   Component Value Date     08/28/2020    K 4.4 08/28/2020     08/28/2020    CO2 26 08/28/2020    BUN 17 08/28/2020    CREATININE 1.0 08/28/2020    GFRAA >60 08/28/2020    LABGLOM >60 08/28/2020    GLUCOSE 87 08/28/2020    PROT 6.8 08/14/2020    LABALBU 3.3 08/28/2020    CALCIUM 10.0 08/28/2020    BILITOT 0.4 08/14/2020    ALKPHOS 98 08/14/2020    AST 11 08/14/2020    ALT <5 08/14/2020     BMP:    Lab Results   Component Value Date     08/28/2020    K 4.4 08/28/2020     08/28/2020    CO2 26 08/28/2020    BUN 17 08/28/2020    LABALBU 3.3 08/28/2020    CREATININE 1.0 08/28/2020    CALCIUM 10.0 08/28/2020    GFRAA >60 08/28/2020    LABGLOM >60 08/28/2020    GLUCOSE 87 08/28/2020     PT/INR:    Lab Results   Component Value Date    PROTIME 12.8 08/24/2020    INR 1.06 08/24/2020     PTT:    Lab Results   Component Value Date    APTT 53.5 08/24/2020   [APTT  U/A:    Lab Results   Component Value Date    COLORU YELLOW 08/15/2020    WBCUA 1 08/15/2020    RBCUA 6 08/15/2020    MUCUS RARE 08/15/2020    TRICHOMONAS NONE SEEN 08/15/2020    BACTERIA NEGATIVE 08/15/2020    CLARITYU CLEAR 08/15/2020    SPECGRAV 1.013 08/15/2020    LEUKOCYTESUR NEGATIVE 08/15/2020    UROBILINOGEN NORMAL 08/15/2020    BILIRUBINUR NEGATIVE 08/15/2020    BLOODU SMALL 08/15/2020     TSH:  No results found for: TSH  VITAMIN B12: No components found for: B12  FOLATE:  No results found for: FOLATE  RPR:  No results found for: RPR  SEAN:  No results found for: ANATITER, SEAN  Urine Toxicology:  No components found for: IAMMENTA, IBARBIT, IBENZO, ICOCAINE, IMARTHC, IOPIATES, IPHENCYC     IMPRESSION:    TIA r/o carotid cardio embolic event    Severe Parkinsons disease    Metabolic encephalopathy    PLAN:    Mri brain no acute infarct    C doppler    Echo    B 12 folate TSH    Continue sinemet-amantadine    Continue asa    Start Plavix    Discussed dx prognosis meds side effects and above with pt and answered all questions. Seth Bush MD  BOARD CERTIFIED-NEUROLOGY.

## 2020-08-28 NOTE — PROGRESS NOTES
[id number] [facility] SPEECH/LANGUAGE PATHOLOGY    Samantha Lester  8/28/2020  [phone]    Attempted to see Samantha Lester for dysphagia follow-up. Pt off the floor at this time. Will reattempt as able.     Anisha Calero MA CCC-SLP  8/28/2020  11:07 AM

## 2020-08-28 NOTE — PROGRESS NOTES
Procedure: Fluoro guided RLQ drain removal.    Outcome: 8 Fr. Pigtail drain removed intact, DSD to site. Patient tolerated well. Report called to floor MAJOR Toro. Transport requested.     Kelby SNOWDEN IR

## 2020-08-28 NOTE — PROGRESS NOTES
Comprehensive Nutrition Assessment    Type and Reason for Visit:  Reassess    Nutrition Recommendations/Plan:   · Continue current diet and supplements, between meals  · Total assist with meals    Nutrition Assessment:  Still weak with drain. Fair intake, consuming less than half of meals. But is consuming oral supplement well. Pt is a total assist on dysphagia diet. Off unit during todays visit. Malnutrition Assessment:  Malnutrition Status:   At risk for malnutrition   Context:  Chronic Illness       Estimated Daily Nutrient Needs:  Energy (kcal):  6701-1761; Weight Used for Energy Requirements:  Admission     Protein (g):  ; Weight Used for Protein Requirements:  Current        Fluid (ml/day): 2435-0950  ; Weight Used for Fluid Requirements:  Admission      Nutrition Related Findings:   Chemo isolation      Wounds:  None       Current Nutrition Therapies:    DIET GENERAL; Dysphagia Minced and Moist  Dietary Nutrition Supplements: Standard Oral Supplement    Anthropometric Measures:  · Height: 6' 1\" (185.4 cm)  · Current Body Weight: 141 lb 8 oz (64.2 kg)   · Admission Body Weight: 154 lb 1.6 oz (69.9 kg)    · Ideal Body Weight: 184 lbs; % Ideal Body Weight 76.9 %   · BMI: 18.7  · BMI Categories: Underweight (BMI less than 22) age over 72       Nutrition Diagnosis:   · Predicted inadequate energy intake related to cognitive or neurological impairment as evidenced by other (comment)(Inability to feed self)    Nutrition Interventions:   Food and/or Nutrient Delivery:  Continue Current Diet, Continue Oral Nutrition Supplement  Nutrition Education/Counseling:  No recommendation at this time   Coordination of Nutrition Care:  Continued Inpatient Monitoring, Feeding Assistance/Environment Change    Goals:  pt will consume greater than 50% of meals and supplements       Nutrition Monitoring and Evaluation:   Food/Nutrient Intake Outcomes:  Food and Nutrient Intake, Supplement Intake  Physical Signs/Symptoms Outcomes:  Biochemical Data, Chewing or Swallowing, Meal Time Behavior, Weight     Discharge Planning:    Continue Oral Nutrition Supplement     Electronically signed by Justin Young RD, LD on 8/28/20 at 1:37 PM EDT    Contact: [phone]

## 2020-08-29 LAB
ALBUMIN SERPL-MCNC: 3 GM/DL (ref 3.4–5)
ANION GAP SERPL CALCULATED.3IONS-SCNC: 9 MMOL/L (ref 4–16)
BUN BLDV-MCNC: 17 MG/DL (ref 6–23)
CALCIUM SERPL-MCNC: 9.5 MG/DL (ref 8.3–10.6)
CHLORIDE BLD-SCNC: 101 MMOL/L (ref 99–110)
CO2: 25 MMOL/L (ref 21–32)
CREAT SERPL-MCNC: 1.1 MG/DL (ref 0.9–1.3)
FOLATE: 6.1 NG/ML (ref 3.1–17.5)
GFR AFRICAN AMERICAN: >60 ML/MIN/1.73M2
GFR NON-AFRICAN AMERICAN: >60 ML/MIN/1.73M2
GLUCOSE BLD-MCNC: 93 MG/DL (ref 70–99)
HOMOCYSTEINE: 6.7 UMOL/L (ref 0–10)
INR BLD: 1.03 INDEX
PHOSPHORUS: 2.5 MG/DL (ref 2.5–4.9)
POTASSIUM SERPL-SCNC: 4.4 MMOL/L (ref 3.5–5.1)
PROTHROMBIN TIME: 12.5 SECONDS (ref 11.7–14.5)
SODIUM BLD-SCNC: 135 MMOL/L (ref 135–145)
TSH HIGH SENSITIVITY: 7.34 UIU/ML (ref 0.27–4.2)

## 2020-08-29 PROCEDURE — 36415 COLL VENOUS BLD VENIPUNCTURE: CPT

## 2020-08-29 PROCEDURE — 6370000000 HC RX 637 (ALT 250 FOR IP): Performed by: INTERNAL MEDICINE

## 2020-08-29 PROCEDURE — 82746 ASSAY OF FOLIC ACID SERUM: CPT

## 2020-08-29 PROCEDURE — 6360000002 HC RX W HCPCS: Performed by: INTERNAL MEDICINE

## 2020-08-29 PROCEDURE — 2580000003 HC RX 258: Performed by: RADIOLOGY

## 2020-08-29 PROCEDURE — 2580000003 HC RX 258: Performed by: INTERNAL MEDICINE

## 2020-08-29 PROCEDURE — 85610 PROTHROMBIN TIME: CPT

## 2020-08-29 PROCEDURE — 82607 VITAMIN B-12: CPT

## 2020-08-29 PROCEDURE — 6370000000 HC RX 637 (ALT 250 FOR IP): Performed by: HOSPITALIST

## 2020-08-29 PROCEDURE — 83090 ASSAY OF HOMOCYSTEINE: CPT

## 2020-08-29 PROCEDURE — 94761 N-INVAS EAR/PLS OXIMETRY MLT: CPT

## 2020-08-29 PROCEDURE — 6370000000 HC RX 637 (ALT 250 FOR IP): Performed by: NURSE PRACTITIONER

## 2020-08-29 PROCEDURE — 6370000000 HC RX 637 (ALT 250 FOR IP): Performed by: PSYCHIATRY & NEUROLOGY

## 2020-08-29 PROCEDURE — 84443 ASSAY THYROID STIM HORMONE: CPT

## 2020-08-29 PROCEDURE — 80069 RENAL FUNCTION PANEL: CPT

## 2020-08-29 PROCEDURE — 1200000000 HC SEMI PRIVATE

## 2020-08-29 RX ADMIN — RIVASTIGMINE TARTRATE 1.5 MG: 1.5 CAPSULE ORAL at 09:31

## 2020-08-29 RX ADMIN — MEROPENEM 1 G: 1 INJECTION, POWDER, FOR SOLUTION INTRAVENOUS at 17:44

## 2020-08-29 RX ADMIN — ESCITALOPRAM OXALATE 10 MG: 10 TABLET ORAL at 21:30

## 2020-08-29 RX ADMIN — WARFARIN SODIUM 5 MG: 5 TABLET ORAL at 17:44

## 2020-08-29 RX ADMIN — ENOXAPARIN SODIUM 70 MG: 80 INJECTION SUBCUTANEOUS at 21:32

## 2020-08-29 RX ADMIN — ENTACAPONE 200 MG: 200 TABLET, FILM COATED ORAL at 17:44

## 2020-08-29 RX ADMIN — ROSUVASTATIN 40 MG: 40 TABLET, FILM COATED ORAL at 09:31

## 2020-08-29 RX ADMIN — CLOPIDOGREL BISULFATE 75 MG: 75 TABLET ORAL at 09:31

## 2020-08-29 RX ADMIN — ENTACAPONE 200 MG: 200 TABLET, FILM COATED ORAL at 21:30

## 2020-08-29 RX ADMIN — SODIUM CHLORIDE, PRESERVATIVE FREE 10 ML: 5 INJECTION INTRAVENOUS at 21:31

## 2020-08-29 RX ADMIN — AMANTADINE HYDROCHLORIDE 100 MG: 100 CAPSULE, LIQUID FILLED ORAL at 12:17

## 2020-08-29 RX ADMIN — ENOXAPARIN SODIUM 70 MG: 80 INJECTION SUBCUTANEOUS at 12:17

## 2020-08-29 RX ADMIN — ENTACAPONE 200 MG: 200 TABLET, FILM COATED ORAL at 14:52

## 2020-08-29 RX ADMIN — CARBIDOPA AND LEVODOPA 2 TABLET: 25; 100 TABLET ORAL at 09:31

## 2020-08-29 RX ADMIN — SODIUM CHLORIDE, PRESERVATIVE FREE 5 ML: 5 INJECTION INTRAVENOUS at 12:18

## 2020-08-29 RX ADMIN — SODIUM CHLORIDE, PRESERVATIVE FREE 10 ML: 5 INJECTION INTRAVENOUS at 12:18

## 2020-08-29 RX ADMIN — CARBIDOPA AND LEVODOPA 2 TABLET: 25; 100 TABLET ORAL at 12:17

## 2020-08-29 RX ADMIN — CARBIDOPA AND LEVODOPA 2 TABLET: 25; 100 TABLET ORAL at 21:31

## 2020-08-29 RX ADMIN — ASPIRIN 81 MG CHEWABLE TABLET 81 MG: 81 TABLET CHEWABLE at 09:31

## 2020-08-29 RX ADMIN — RIVASTIGMINE TARTRATE 1.5 MG: 1.5 CAPSULE ORAL at 21:30

## 2020-08-29 RX ADMIN — AMLODIPINE BESYLATE 5 MG: 5 TABLET ORAL at 09:31

## 2020-08-29 RX ADMIN — SODIUM CHLORIDE, PRESERVATIVE FREE 5 ML: 5 INJECTION INTRAVENOUS at 21:32

## 2020-08-29 RX ADMIN — MEROPENEM 1 G: 1 INJECTION, POWDER, FOR SOLUTION INTRAVENOUS at 12:17

## 2020-08-29 RX ADMIN — AMANTADINE HYDROCHLORIDE 100 MG: 100 CAPSULE, LIQUID FILLED ORAL at 21:31

## 2020-08-29 RX ADMIN — CARBIDOPA AND LEVODOPA 2 TABLET: 25; 100 TABLET ORAL at 17:44

## 2020-08-29 RX ADMIN — MEROPENEM 1 G: 1 INJECTION, POWDER, FOR SOLUTION INTRAVENOUS at 04:25

## 2020-08-29 RX ADMIN — BICALUTAMIDE 50 MG: 50 TABLET ORAL at 09:31

## 2020-08-29 RX ADMIN — ENTACAPONE 200 MG: 200 TABLET, FILM COATED ORAL at 09:31

## 2020-08-29 NOTE — PROGRESS NOTES
2 times per day    carbidopa-levodopa  2 tablet Oral 4x Daily    And    entacapone  200 mg Oral 4x Daily      Infusions:   PRN Meds: cloNIDine, 0.1 mg, TID PRN  potassium chloride, 10 mEq, PRN  sodium chloride flush, 10 mL, PRN  acetaminophen, 650 mg, Q6H PRN    Or  acetaminophen, 650 mg, Q6H PRN  polyethylene glycol, 17 g, Daily PRN  promethazine, 12.5 mg, Q6H PRN    Or  ondansetron, 4 mg, Q6H PRN  morphine, 2 mg, Q4H PRN            Pertinent New Labs & Imaging Studies     CBC with Differential:    Lab Results   Component Value Date    WBC 7.2 08/26/2020    RBC 3.57 08/26/2020    HGB 10.7 08/26/2020    HCT 34.9 08/26/2020     08/26/2020    MCV 97.8 08/26/2020    MCH 30.0 08/26/2020    MCHC 30.7 08/26/2020    RDW 14.3 08/26/2020    SEGSPCT 64.7 08/25/2020    LYMPHOPCT 23.2 08/25/2020    MONOPCT 7.4 08/25/2020    BASOPCT 0.8 08/25/2020    MONOSABS 0.5 08/25/2020    LYMPHSABS 1.5 08/25/2020    EOSABS 0.2 08/25/2020    BASOSABS 0.1 08/25/2020    DIFFTYPE AUTOMATED DIFFERENTIAL 08/25/2020     CMP:    Lab Results   Component Value Date     08/29/2020    K 4.4 08/29/2020     08/29/2020    CO2 25 08/29/2020    BUN 17 08/29/2020    CREATININE 1.1 08/29/2020    GFRAA >60 08/29/2020    LABGLOM >60 08/29/2020    GLUCOSE 93 08/29/2020    PROT 6.8 08/14/2020    LABALBU 3.0 08/29/2020    CALCIUM 9.5 08/29/2020    BILITOT 0.4 08/14/2020    ALKPHOS 98 08/14/2020    AST 11 08/14/2020    ALT <5 08/14/2020     Ct Abdomen Pelvis Wo Contrast Additional Contrast? None    Result Date: 8/16/2020  EXAMINATION: CT OF THE ABDOMEN AND PELVIS WITHOUT CONTRAST 8/14/2020 9:52 pm TECHNIQUE: CT of the abdomen and pelvis was performed without the administration of intravenous contrast. Multiplanar reformatted images are provided for review. Dose modulation, iterative reconstruction, and/or weight based adjustment of the mA/kV was utilized to reduce the radiation dose to as low as reasonably achievable. COMPARISON: None.  HISTORY: ORDERING SYSTEM PROVIDED HISTORY: nausea, vomiting, abdominal pain TECHNOLOGIST PROVIDED HISTORY: Reason for exam:->nausea, vomiting, abdominal pain Additional Contrast?->None Reason for Exam: nausea, vomiting, abdominal pain Acuity: Acute Type of Exam: Initial FINDINGS: Lower Chest: There is dependent consolidation in the lungs. Organs: There is mild right hydronephrosis. There is no obstructing stone. No acute abnormality of the liver, spleen, pancreas, adrenals, gallbladder, or left kidney. GI/Bowel: Bowel caliber is normal.  There is no evidence of active bowel inflammation. There is no evidence of acute appendicitis. Pelvis: In the right pelvic sidewall is a 56 x 67 x 66 mm (approximately) ill-defined mass adjacent to the iliac vessels. Borders of the mass are ill-defined. It medially displaces the right ureter. The mass appears somewhat elongated along the course of the iliac vein. The mass has an average density of 35 Hounsfield units. The urinary bladder is unremarkable. There are clips from prostatectomy. Peritoneum/Retroperitoneum: See above. No free air or free fluid. Bones/Soft Tissues: No acute osseous abnormality. Right pelvic mass. Differential diagnosis includes subacute hematoma or infiltrative malignancy. Mild right hydronephrosis due to compression of the ureter by the mass. Bibasilar pulmonary consolidation or atelectasis. Ct Head Wo Contrast    Result Date: 8/15/2020  EXAMINATION: CT OF THE HEAD WITHOUT CONTRAST 8/15/2020 6:09 pm TECHNIQUE: CT of the head was performed without the administration of intravenous contrast. Dose modulation, iterative reconstruction, and/or weight based adjustment of the mA/kV was utilized to reduce the radiation dose to as low as reasonably achievable. COMPARISON: Brain MRI 04/09/2018 HISTORY: ORDERING SYSTEM PROVIDED HISTORY: lethargic TECHNOLOGIST PROVIDED HISTORY: Reason for exam:->lethargic Has a \"code stroke\" or \"stroke alert\" been called? ->No to switch to oral antibiotics upon discharge, end date 9/2. Rt upper extremity DVT  US DVT of the right upper arm DVT  POA would like to discuss with patient's wife about potential oral anticoagulation, warfarin versus Eliquis. Risk and benefits were explained.  -On therapeutic Lovenox. Start oral warfarin. Consult pharmacy to dose. Acute metabolic encephalopathy-Improved  Likely from sepsis   POA is concerned about patient's speech. -MRI brain: No acute process. Neurology concerned about possible TIA. Patient was on aspirin. Neurology started Plavix. Neurology to reevaluate to see if patient needs to be on aspirin and Plavix in addition to warfarin. Change in speech may have been due to a combination of sepsis and Parkinson's. Acute respiratory failure with Hypoxia-resolved  Pneumonia  COVID 19 test negative  Continue abx as above  Given  lasix   X ray with worsening bilateral airspace dz  CXR: Improvement in aeration of lungs. On room air. BNP improved to 359. SLIM -resolved  Likely from ATN with post obstructive uropathy  Hydronephrosis on kidney  Nephrology recommendations appreciated  Renal ultrasound: Unremarkable, no hydronephrosis. Moderate PCM  Dietary following, appreciated recommendations    Prostate cancer   S/p Prostatectomy    Anemia of Chronic disorders    Dementia with Parkinson's Dz    -Discussed case with POA yesterday. Diet DIET GENERAL; Dysphagia Minced and Moist  Dietary Nutrition Supplements: Standard Oral Supplement   DVT Prophylaxis [x] Lovenox, []  Heparin, [] SCDs, [] Ambulation   GI Prophylaxis [] PPI,  [] H2 Blocker,  [] Carafate,  [x] Diet/Tube Feeds   Code Status Full Code   Disposition  Plan for [facility]. Hopeful for DC today or tomorrow.    MDM [] Low, [x] Moderate,[]  High      Electronically signed by Rachel Sanderson MD on 8/29/2020 at 9:44 AM

## 2020-08-29 NOTE — CONSULTS
PHARMACY ANTICOAGULATION MONITORING Eulalio Ball is a 79 y.o. male on warfarin therapy for Upper Extremity DVT. Pharmacy consulted by Dr. Tamara Moon for monitoring and adjustment of treatment. Indication for anticoagulation: Upper extremity DVT. INR goal: 2-3  Warfarin dose prior to admission: new start    Pertinent Laboratory Values   Recent Labs     08/29/20  0441   INR 1.03     INR MONITORING  Lab Results   Component Value Date    INR 1.03 08/29/2020    INR 1.34 08/28/2020    INR 1.06 08/24/2020    INR 1.11 08/23/2020    INR 1.11 08/22/2020     Assessment/Plan:   Drug Interactions: aspirin, Sinemet, rosuvastatin, amantadine, escitalopram, entacapone (home meds), clopidogrel, meropenem,  pt started on therapeutic dose of enoxaparin (1mg/kg bid) to bridge warfarin.  INR remains very low as to be expected. First warfarin 5mg dose given last night.  Continue warfarin 5mg daily and adjust dose per INR trends. [address] [place] [address] will continue to monitor and adjust warfarin therapy as indicated    Thank you for the consult. Nayan Farias  8/29/2020 2:53 PM

## 2020-08-29 NOTE — PROGRESS NOTES
NEUROLOGY NOTE  DR. Seth Bush MD.  -------------------------------------------------  Subjective:    no new symptoms    Denies any side effects    Doing better. Denies any new symptoms. Denies headache nausea vomiting dizziness    Denies numbness weakness extremities    Denies blurring of vision double vision    Objective:    BP (!) 104/56   Pulse 58   Temp 97.8 °F (36.6 °C)   Resp 14   Ht 6' 1\" (1.854 m)   Wt 141 lb 8 oz (64.2 kg)   SpO2 100%   BMI 18.67 kg/m²   HEENT nl      Neuro exam    Alert Oriented  X 2  Follow simple commands  EOMI Pupils 3 mm lupillo  4+/5 all 4 extremities-positive cogwheel rigidity-bradykinesia      RADIOLOGY  -----------------    Ct Abdomen Pelvis Wo Contrast Additional Contrast? None    Result Date: 8/16/2020  EXAMINATION: CT OF THE ABDOMEN AND PELVIS WITHOUT CONTRAST 8/14/2020 9:52 pm TECHNIQUE: CT of the abdomen and pelvis was performed without the administration of intravenous contrast. Multiplanar reformatted images are provided for review. Dose modulation, iterative reconstruction, and/or weight based adjustment of the mA/kV was utilized to reduce the radiation dose to as low as reasonably achievable. COMPARISON: None. HISTORY: ORDERING SYSTEM PROVIDED HISTORY: nausea, vomiting, abdominal pain TECHNOLOGIST PROVIDED HISTORY: Reason for exam:->nausea, vomiting, abdominal pain Additional Contrast?->None Reason for Exam: nausea, vomiting, abdominal pain Acuity: Acute Type of Exam: Initial FINDINGS: Lower Chest: There is dependent consolidation in the lungs. Organs: There is mild right hydronephrosis. There is no obstructing stone. No acute abnormality of the liver, spleen, pancreas, adrenals, gallbladder, or left kidney. GI/Bowel: Bowel caliber is normal.  There is no evidence of active bowel inflammation. There is no evidence of acute appendicitis. Pelvis: In the right pelvic sidewall is a 56 x 67 x 66 mm (approximately) ill-defined mass adjacent to the iliac vessels. Borders of the mass are ill-defined. It medially displaces the right ureter. The mass appears somewhat elongated along the course of the iliac vein. The mass has an average density of 35 Hounsfield units. The urinary bladder is unremarkable. There are clips from prostatectomy. Peritoneum/Retroperitoneum: See above. No free air or free fluid. Bones/Soft Tissues: No acute osseous abnormality. Right pelvic mass. Differential diagnosis includes subacute hematoma or infiltrative malignancy. Mild right hydronephrosis due to compression of the ureter by the mass. Bibasilar pulmonary consolidation or atelectasis. Xr Chest (2 Vw)    Result Date: 8/25/2020  EXAMINATION: TWO XRAY VIEWS OF THE CHEST 8/25/2020 7:20 pm COMPARISON: 08/21/2020 HISTORY: ORDERING SYSTEM PROVIDED HISTORY: hypoxia TECHNOLOGIST PROVIDED HISTORY: Reason for exam:->hypoxia Reason for Exam: hypoxia Acuity: Unknown Type of Exam: Initial Additional signs and symptoms: weakness Relevant Medical/Surgical History: prostate CA FINDINGS: The heart and mediastinal structures are stable. The aeration of the lungs has improved from prior exam compatible with resolving edema or pneumonia. There is some residual airspace disease left lung base. No significant pleural effusion is noted. Bones are osteopenic with arthritis of the left shoulder. Significant improvement in the aeration of the lungs suggesting diminishing pulmonary edema or pneumonia with some residual left basilar airspace disease. Ct Head Wo Contrast    Result Date: 8/15/2020  EXAMINATION: CT OF THE HEAD WITHOUT CONTRAST 8/15/2020 6:09 pm TECHNIQUE: CT of the head was performed without the administration of intravenous contrast. Dose modulation, iterative reconstruction, and/or weight based adjustment of the mA/kV was utilized to reduce the radiation dose to as low as reasonably achievable.  COMPARISON: Brain MRI 04/09/2018 HISTORY: ORDERING SYSTEM PROVIDED HISTORY: lethargic TECHNOLOGIST PROVIDED HISTORY: Reason for exam:->lethargic Has a \"code stroke\" or \"stroke alert\" been called? ->No Reason for Exam: lethargic Acuity: Acute Type of Exam: Initial FINDINGS: Patient motion in some areas, partially limiting evaluation of those areas. BRAIN/VENTRICLES:  No masses nor acute intracranial hemorrhage. Intact gray/white matter differentiation without findings of acute ischemia. No mass effect nor midline shift. Patent basilar cisterns and foramen magnum. No hydrocephalus. Age-appropriate mild to moderate diffuse atrophy. Mild to moderate subcortical, deep, and periventricular white matter hypodensities likely due to chronic small vessel ischemia with additional pontine involvement. ORBITS:  Bilateral lens implants. Otherwise normal without acute abnormality. SINUSES:  Normally pneumatized and aerated. SOFT TISSUES/SKULL:  No acute soft tissue abnormality. Moderate atherosclerotic calcifications. No acute fracture. 1. No acute intracranial abnormality. 2. Chronic changes as above. Xr Chest Portable    Result Date: 8/21/2020  EXAMINATION: ONE XRAY VIEW OF THE CHEST 8/21/2020 12:02 pm COMPARISON: 08/16/2020 HISTORY: ORDERING SYSTEM PROVIDED HISTORY: SOb, requiring O2 support TECHNOLOGIST PROVIDED HISTORY: Reason for exam:->SOb, requiring O2 support Reason for Exam: SOb, requiring O2 support FINDINGS: The heart and mediastinal structures are stable. Worsening bilateral airspace disease is noted consistent with pneumonia. No significant pleural effusion is seen. Worsening bilateral airspace disease. Xr Chest 1 View    Result Date: 8/16/2020  EXAMINATION: ONE XRAY VIEW OF THE CHEST 8/16/2020 10:07 am COMPARISON: CT abdomen/pelvis 08/14/2020. HISTORY: ORDERING SYSTEM PROVIDED HISTORY: hypoxia and fever TECHNOLOGIST PROVIDED HISTORY: Reason for exam:->hypoxia and fever Acuity: Acute Type of Exam: Subsequent/Follow-up FINDINGS: Single view provided. Moderate rotation. Mediastinal and cardiac silhouettes are normal.  The lung volumes are normal.  There are bilateral symmetric basilar predominant ground-glass densities with mild interstitial opacities. No lobar consolidation. No large effusion or pneumothorax. No free subdiaphragmatic air. Bilateral symmetric interstitial opacities more prominent lung bases. This could represent possible edema versus atypical pneumonia. Vl Dup Carotid Bilateral    Result Date: 8/28/2020  EXAMINATION: ULTRASOUND EVALUATION OF THE CAROTID ARTERIES 8/28/2020 COMPARISON: None. HISTORY: ORDERING SYSTEM PROVIDED HISTORY: r/o carotid stenosis TECHNOLOGIST PROVIDED HISTORY: Reason for exam:->r/o carotid stenosis Reason for Exam: slurred speech Acuity: Acute Type of Exam: Initial Additional signs and symptoms: nk Relevant Medical/Surgical History: hx of Parkinsen's FINDINGS: RIGHT: The right common carotid artery demonstrates peak systolic velocities of 91 and 96 cm/sec in the proximal and distal segments respectively. The right internal carotid artery demonstrates the systolic velocities of 74, 73, and 66 cm/sec in the proximal, mid and distal segments respectively. The external carotid artery is patent. The vertebral artery demonstrates normal antegrade flow. Mild calcified shadowing plaque involving the bifurcation and bulb. ICA/CCA ratio of 0.8. LEFT: The left common carotid artery demonstrates peak systolic velocities of 869 and 95 cm/sec in the proximal and distal segments respectively. The left internal carotid artery demonstrates the systolic velocities of 69, 89, and 84 cm/sec in the proximal, mid and distal segments respectively. The external carotid artery is patent. The vertebral artery demonstrates normal antegrade flow. Moderate severe calcified atherosclerotic plaque involving the bifurcation and bulb. ICA/CCA ratio of 0.9. The right internal carotid artery demonstrates 0-50% stenosis .  The left internal carotid artery demonstrates 0-50% stenosis . Bilateral vertebral arteries are patent with flow in the normal direction. Calcified atherosclerotic plaque at the carotid bifurcations and bulbs, left greater than right. Vl Dup Upper Extremity Venous Right    Result Date: 8/24/2020  EXAMINATION: DUPLEX ULTRASOUND OF THE RIGHT UPPER EXTREMITY FOR DVT, 8/24/2020 2:52 pm TECHNIQUE: Duplex ultrasound and Doppler images were obtained of the deep venous structures of the upper extremity. COMPARISON: None. HISTORY: ORDERING SYSTEM PROVIDED HISTORY: CLOT TECHNOLOGIST PROVIDED HISTORY: Reason For Exam Priority: STAT To r/o DVT Comments:  During UltraSound examination of vasculature probable DVT noted in RUE anterior mid forearm - line placed in upper arm in another vessel. RN notified and will contact Physician for further orders. Reason for exam:->CLOT Acuity: Acute Type of Exam: Initial FINDINGS: Nonocclusive echogenic thrombus is seen in the right axillary, brachial, basilic, and radial veins. No color Doppler flow is appreciated in the ulnar vein. The visualized portions of the internal jugular and subclavian veins appear patent. Nonocclusive deep vein thrombosis of the right axillary, brachial, and radial veins. Occlusive thrombosis of the ulnar vein. Nonocclusive superficial vein thrombosis of the basilic vein. Findings discussed with Dr. Nina Girmes by Dr. Kyleigh Jaime on 08/24/2020 at 1550 hours. Ct Urogram    Result Date: 8/20/2020  EXAMINATION: CT UROGRAM 8/18/2020 9:39 am TECHNIQUE: CT of the abdomen and pelvis was performed before and after the administration of intravenous contrast as per CT urogram protocol. Multiplanar reformatted images as well as MIP urogram images are provided for review. Dose modulation, iterative reconstruction, and/or weight based adjustment of the mA/kV was utilized to reduce the radiation dose to as low as reasonably achievable.  COMPARISON: 08/14/2020 HISTORY: ORDERING SYSTEM PROVIDED HISTORY: in caliber with moderate to severe atherosclerosis. No retroperitoneal or mesenteric adenopathy. No definite ascites or drainable fluid collection. Bones/Soft Tissues: No acute findings in the bones or soft tissues. Diffuse osteopenia. 1. There is a complex 5.5 x 4.7 cm collection in the right pelvis with surrounding mild peripheral enhancement extends into the right iliopsoas muscle and likely abuts and encases the right external iliac vessels. There is also obstruction of the right ureter resulting in mild hydroureteronephrosis. There is a fluid-filled thick-walled structure extending from the cecum towards this collection raising suspicion for perforated appendicitis with associated phlegmon/abscess. Less likely considerations include hematoma or malignancy given history of prostate cancer. 2. Status post prostatectomy. Otherwise no obvious urinary tract filling defect or calculus given limitations due to motion artifact. Results were called by Dr. Bennie Terry. Surekha Garrido MD to [facility] on 8/18/2020 at 14:15. Ir Change Drainage Cath    Result Date: 8/28/2020  PROCEDURE: IR CHANGE DRAINAGE CATH 8/28/2020 HISTORY: ORDERING SYSTEM PROVIDED HISTORY: Remove KANA Drain TECHNOLOGIST PROVIDED HISTORY: Reason for exam:->Remove KANA Drain TECHNIQUE: 0.3 minutes with 4 fluoroscopic images saved. AK 15. CONTRAST: 5 cc of Omnipaque SEDATION: None FLUOROSCOPY DOSE AND TYPE OR TIME AND EXPOSURES: 0.3 minutes with 4 fluoroscopic images saved. AK 15. DESCRIPTION OF PROCEDURE: Informed consent was obtained after a detailed explanation of the procedure including risks, benefits, and alternatives. Universal protocol was observed. Under sterile technique, dilute contrast was injected into the catheter. There was no significant collection or fistulous communication noted. Afterwards under sterile technique the catheter was removed and a sterile dressing applied. There was no bleeding or hematoma noted.   The patient tolerated the procedure well. FINDINGS: No signs for a significant residual cavity. No significant residual cavity or fistula noted. The catheter was removed. Ct Limited Follow Up    Result Date: 8/19/2020  EXAMINATION: CT LIMITED/FOLLOW UP 8/19/2020 1:17 pm TECHNIQUE: Dose modulation, iterative reconstruction, and/or weight based adjustment of the mA/kV was utilized to reduce the radiation dose to as low as reasonably achievable. A preprocedural CT was performed to help determine the needle course. Of note, the patient did exhibit significant confusion and was not able to remain still throughout the procedure. 1% subcutaneous lidocaine was instilled at the expected needle entry site. Next, an V[address]gr[place]nden 24 needle was placed superficially in the right hip. The patient was again asked to remain still but was unable to. The patient was given moderate sedation, which did not limit his movements. At this point, I did not feel that it was safe to proceed with simple moderate sedation due to the vascular structures in the region of the needle path. Next, Dr. Kofi Bauer was contacted regarding the clinical scenario and recommended contacting anesthesia for assistance. Anesthesia was unavailable to assist with today's procedure and so it was rescheduled for tomorrow and Dr. Kofi Bauer was informed of this. The needle was removed and the patient suffered no immediate complications. EBL: Less than 5 cc COMPARISON: CT urogram 08/18/2020 HISTORY: ORDERING SYSTEM PROVIDED HISTORY: pelvic mass TECHNOLOGIST PROVIDED HISTORY: Reason for exam:->pelvic mass Reason for Exam: abcess drainage Patient has a lesion in the right lower quadrant, which is likely a multiloculated abscess. A mass is also a consideration, but less likely. The patient is currently confused and so the patient's wife was consented for the procedure. Plan to place a drain in the collection and/or aspiration.  ASA 3 Mallampati 2 FINDINGS: No significant change in the right lower quadrant approximately 6.3 x 4.4 cm mixed density collection anterior to the right iliac vessels. Unsuccessful CT-guided drain attempt. RECOMMENDATIONS: Will re-attempt the procedure with the assistance of anesthesia. Mri Brain Wo Contrast    Result Date: 8/28/2020  EXAMINATION: MRI OF THE BRAIN WITHOUT CONTRAST  8/28/2020 3:21 pm TECHNIQUE: Multiplanar multisequence MRI of the brain was performed without the administration of intravenous contrast. COMPARISON: 04/09/2018 HISTORY: ORDERING SYSTEM PROVIDED HISTORY: change in speech TECHNOLOGIST PROVIDED HISTORY: Reason for exam:->change in speech FINDINGS: INTRACRANIAL STRUCTURES/VENTRICLES: There is no acute infarct or mass. There is stable diffuse parenchymal volume loss with mild-to-moderate chronic white matter microvascular ischemic changes. No mass effect or midline shift. No evidence of an acute intracranial hemorrhage. The ventricles and sulci are normal in size and configuration. The sellar/suprasellar regions appear unremarkable. The normal signal voids within the major intracranial vessels appear maintained. ORBITS: The visualized portion of the orbits demonstrate no acute abnormality. SINUSES: Paranasal sinuses are well aerated. Right mastoid effusion is noted. No left mastoid effusion is evident. BONES/SOFT TISSUES: The bone marrow signal intensity appears normal. The soft tissues demonstrate no acute abnormality. 1. No acute intracranial abnormality. 2. Diffuse parenchymal volume loss with mild-to-moderate chronic white matter microvascular ischemic changes. 3. Right mastoid effusion. Us Retroperitoneal Limited    Result Date: 8/25/2020  EXAMINATION: ULTRASOUND OF THE KIDNEYS 8/25/2020 9:28 am COMPARISON: None.  HISTORY: ORDERING SYSTEM PROVIDED HISTORY: hydronephrosis TECHNOLOGIST PROVIDED HISTORY: Reason for exam:->hydronephrosis Reason for Exam: reevaluate mild hydro Acuity: Acute Type of Exam: sequentially dilated to accommodate an 8 [place] drain. A total of approximately 15 cc of thick bloody/purulent was aspirated and sent for cytology, gram stain and culture. The drain was secured to the skin with suture. Drain was attached to a suction bulb. The patient tolerated the procedure well and there were no immediate complications. EBL: Less than 5 cc FINDINGS: 1.  8 [place] drain seen within the right lower quadrant, which yielded purulent material. 2.  No free air identified within the visualized abdomen. 3.  Previously noted thickening of the appendix and the size of the collection are not significantly changed, though evaluation is somewhat limited without IV contrast.     1.  Technically successful placement of an 8 [place] drain in the right lower quadrant collection. 2.  Aspiration of purulent material supports the diagnosis of ruptured appendicitis with abscess\phlegmon, though continued attention on follow-up is recommended. RECOMMENDATIONS: 1. Drain to bulb drainage. 2.  Flush right lower quadrant drain with 2-5 cc normal saline b.i.d. 3.  Record daily drain output.        LAB RESULTS  --------------------    Recent Results (from the past 24 hour(s))   Renal Function Panel    Collection Time: 08/29/20  4:41 AM   Result Value Ref Range    Sodium 135 135 - 145 MMOL/L    Potassium 4.4 3.5 - 5.1 MMOL/L    Chloride 101 99 - 110 mMol/L    CO2 25 21 - 32 MMOL/L    Anion Gap 9 4 - 16    BUN 17 6 - 23 MG/DL    CREATININE 1.1 0.9 - 1.3 MG/DL    Glucose 93 70 - 99 MG/DL    Calcium 9.5 8.3 - 10.6 MG/DL    GFR Non-African American >60 >60 mL/min/1.73m2    GFR African American >60 >60 mL/min/1.73m2    Alb 3.0 (L) 3.4 - 5.0 GM/DL    Phosphorus 2.5 2.5 - 4.9 MG/DL   Protime-INR    Collection Time: 08/29/20  4:41 AM   Result Value Ref Range    Protime 12.5 11.7 - 14.5 SECONDS    INR 1.03 INDEX   Homocysteine, Serum    Collection Time: 08/29/20  4:41 AM   Result Value Ref Range    Homocysteine 6.7 0 - 10 umol/L Vitamin B12 & Folate    Collection Time: 08/29/20  4:41 AM   Result Value Ref Range    Folate 6.1 3.1 - 17.5 NG/ML   TSH without Reflex    Collection Time: 08/29/20  4:41 AM   Result Value Ref Range    TSH, High Sensitivity 7.340 (H) 0.270 - 4.20 uIu/ml         Medical problems    Patient Active Problem List:     Weight loss, unintentional     History of colon polyps     Pelvic mass      ASSESSMENT:  ---------------------    TIA r/o carotid cardio embolic event     Severe Parkinsons disease     Metabolic encephalopathy     PLAN:     Mri brain no acute infarct     C doppler neg except atherosclerotic carotid arteries     Nl  folate TSH     Continue sinemet-amantadine     Continue asa      Plavix     Discussed dx prognosis meds side effects and above with pt and answered all questions.     Discussed with pts hospitlaist.      Electronically signed by Kel Love MD on 8/29/2020 at 5:24 PM

## 2020-08-29 NOTE — PROGRESS NOTES
Nephrology Progress Note  8/29/2020 4:36 PM        Subjective:   Admit Date: 8/14/2020  PCP: Melecio Peres MD    Interval History: urinary incontinence     Diet: / some    ROS:  Thin , no sob , awake  and awake and at  Least  partially oriented    Data:     Current med's:    clopidogrel  75 mg Oral Daily    warfarin  5 mg Oral Daily    enoxaparin  1 mg/kg Subcutaneous BID    potassium chloride  20 mEq Intravenous Once    meropenem  1 g Intravenous Q8H    amLODIPine  5 mg Oral Daily    sodium chloride flush  5 mL Intracatheter BID    lidocaine  1 patch Transdermal Daily    amantadine  100 mg Oral BID    aspirin  81 mg Oral Daily    escitalopram  10 mg Oral Nightly    rivastigmine  1.5 mg Oral BID    rosuvastatin  40 mg Oral QAM    bicalutamide  50 mg Oral Daily    sodium chloride flush  10 mL Intravenous 2 times per day    carbidopa-levodopa  2 tablet Oral 4x Daily    And    entacapone  200 mg Oral 4x Daily           I/O last 3 completed shifts: In: 600 [P.O.:600]  Out: -     CBC: No results for input(s): WBC, HGB, PLT in the last 72 hours. Recent Labs     08/28/20  0413 08/29/20  0441    135   K 4.4 4.4    101   CO2 26 25   BUN 17 17   CREATININE 1.0 1.1   GLUCOSE 87 93       Lab Results   Component Value Date    CALCIUM 9.5 08/29/2020    PHOS 2.5 08/29/2020       Objective:     Vitals: /70   Pulse 64   Temp 97.7 °F (36.5 °C) (Oral)   Resp 14   Ht 6' 1\" (1.854 m)   Wt 141 lb 8 oz (64.2 kg)   SpO2 (!) 89%   BMI 18.67 kg/m²     General appearance:  Thin, no ac distress  HEENT:  + conjpalor  Neck:  supple  Lungs:  No gross crackles  Heart:  Seems RRR  Abdomen: soft  Extremities:  No edema       Problem List :         Impression :     1. SLIM- CKD stage G 3  A - peak cr was 2.0- UA was near bland on  8/15/20- recovering likely from  ATI  And infection and hydro   2.  Rt pelvic abscess S/p drain by IR with Rt hydro - unsure what caused the abscess - polymicrobial -   3. ? Underlying scleroderma / prostate ca     Recommendation/Plan  :     1. On emp abx   2. Has risk  For  recurrent SLIM   3.  Watch   4. probably need to know the etiology of abscess  / manual BP   5. follow clinically       Jimmy Rodriguez MD

## 2020-08-30 VITALS
SYSTOLIC BLOOD PRESSURE: 113 MMHG | HEART RATE: 68 BPM | DIASTOLIC BLOOD PRESSURE: 61 MMHG | BODY MASS INDEX: 17.83 KG/M2 | HEIGHT: 73 IN | WEIGHT: 134.5 LBS | OXYGEN SATURATION: 100 % | TEMPERATURE: 97.3 F | RESPIRATION RATE: 16 BRPM

## 2020-08-30 LAB
ALBUMIN SERPL-MCNC: 3.3 GM/DL (ref 3.4–5)
ANION GAP SERPL CALCULATED.3IONS-SCNC: 9 MMOL/L (ref 4–16)
BUN BLDV-MCNC: 13 MG/DL (ref 6–23)
CALCIUM SERPL-MCNC: 9.7 MG/DL (ref 8.3–10.6)
CHLORIDE BLD-SCNC: 102 MMOL/L (ref 99–110)
CO2: 25 MMOL/L (ref 21–32)
CREAT SERPL-MCNC: 1 MG/DL (ref 0.9–1.3)
GFR AFRICAN AMERICAN: >60 ML/MIN/1.73M2
GFR NON-AFRICAN AMERICAN: >60 ML/MIN/1.73M2
GLUCOSE BLD-MCNC: 93 MG/DL (ref 70–99)
INR BLD: 1.16 INDEX
PHOSPHORUS: 2.5 MG/DL (ref 2.5–4.9)
POTASSIUM SERPL-SCNC: 4.1 MMOL/L (ref 3.5–5.1)
PROTHROMBIN TIME: 14.1 SECONDS (ref 11.7–14.5)
SODIUM BLD-SCNC: 136 MMOL/L (ref 135–145)

## 2020-08-30 PROCEDURE — 6360000002 HC RX W HCPCS: Performed by: INTERNAL MEDICINE

## 2020-08-30 PROCEDURE — 36415 COLL VENOUS BLD VENIPUNCTURE: CPT

## 2020-08-30 PROCEDURE — 80069 RENAL FUNCTION PANEL: CPT

## 2020-08-30 PROCEDURE — 2580000003 HC RX 258: Performed by: RADIOLOGY

## 2020-08-30 PROCEDURE — 6370000000 HC RX 637 (ALT 250 FOR IP): Performed by: HOSPITALIST

## 2020-08-30 PROCEDURE — 2580000003 HC RX 258: Performed by: INTERNAL MEDICINE

## 2020-08-30 PROCEDURE — 6370000000 HC RX 637 (ALT 250 FOR IP): Performed by: NURSE PRACTITIONER

## 2020-08-30 PROCEDURE — 94761 N-INVAS EAR/PLS OXIMETRY MLT: CPT

## 2020-08-30 PROCEDURE — 6370000000 HC RX 637 (ALT 250 FOR IP): Performed by: INTERNAL MEDICINE

## 2020-08-30 PROCEDURE — 6370000000 HC RX 637 (ALT 250 FOR IP): Performed by: PSYCHIATRY & NEUROLOGY

## 2020-08-30 PROCEDURE — 85610 PROTHROMBIN TIME: CPT

## 2020-08-30 RX ORDER — AMOXICILLIN AND CLAVULANATE POTASSIUM 875; 125 MG/1; MG/1
1 TABLET, FILM COATED ORAL 2 TIMES DAILY
Qty: 6 TABLET | Refills: 0 | Status: SHIPPED | OUTPATIENT
Start: 2020-08-30 | End: 2020-09-02

## 2020-08-30 RX ORDER — CLOPIDOGREL BISULFATE 75 MG/1
75 TABLET ORAL DAILY
Qty: 30 TABLET | Refills: 0 | Status: SHIPPED | OUTPATIENT
Start: 2020-08-31

## 2020-08-30 RX ORDER — AMLODIPINE BESYLATE 5 MG/1
5 TABLET ORAL DAILY
Qty: 30 TABLET | Refills: 0 | Status: SHIPPED | OUTPATIENT
Start: 2020-08-31 | End: 2021-07-12 | Stop reason: DRUGHIGH

## 2020-08-30 RX ADMIN — CARBIDOPA AND LEVODOPA 2 TABLET: 25; 100 TABLET ORAL at 09:50

## 2020-08-30 RX ADMIN — ASPIRIN 81 MG CHEWABLE TABLET 81 MG: 81 TABLET CHEWABLE at 09:50

## 2020-08-30 RX ADMIN — BICALUTAMIDE 50 MG: 50 TABLET ORAL at 09:51

## 2020-08-30 RX ADMIN — ENTACAPONE 200 MG: 200 TABLET, FILM COATED ORAL at 09:51

## 2020-08-30 RX ADMIN — MEROPENEM 1 G: 1 INJECTION, POWDER, FOR SOLUTION INTRAVENOUS at 09:53

## 2020-08-30 RX ADMIN — ROSUVASTATIN 40 MG: 40 TABLET, FILM COATED ORAL at 09:50

## 2020-08-30 RX ADMIN — CARBIDOPA AND LEVODOPA 2 TABLET: 25; 100 TABLET ORAL at 14:14

## 2020-08-30 RX ADMIN — SODIUM CHLORIDE, PRESERVATIVE FREE 5 ML: 5 INJECTION INTRAVENOUS at 11:18

## 2020-08-30 RX ADMIN — AMANTADINE HYDROCHLORIDE 100 MG: 100 CAPSULE, LIQUID FILLED ORAL at 09:58

## 2020-08-30 RX ADMIN — CLOPIDOGREL BISULFATE 75 MG: 75 TABLET ORAL at 09:50

## 2020-08-30 RX ADMIN — MEROPENEM 1 G: 1 INJECTION, POWDER, FOR SOLUTION INTRAVENOUS at 04:13

## 2020-08-30 RX ADMIN — RIVASTIGMINE TARTRATE 1.5 MG: 1.5 CAPSULE ORAL at 09:51

## 2020-08-30 RX ADMIN — ENTACAPONE 200 MG: 200 TABLET, FILM COATED ORAL at 14:14

## 2020-08-30 RX ADMIN — SODIUM CHLORIDE, PRESERVATIVE FREE 10 ML: 5 INJECTION INTRAVENOUS at 09:51

## 2020-08-30 RX ADMIN — APIXABAN 2.5 MG: 2.5 TABLET, FILM COATED ORAL at 11:22

## 2020-08-30 RX ADMIN — AMLODIPINE BESYLATE 5 MG: 5 TABLET ORAL at 09:50

## 2020-08-30 ASSESSMENT — PAIN SCALES - GENERAL: PAINLEVEL_OUTOF10: 0

## 2020-08-30 NOTE — PROGRESS NOTES
Nephrology Progress Note  8/30/2020 4:19 PM        Subjective:   Admit Date: 8/14/2020  PCP: Alessandro Wei MD    Interval History: Pt seen earlier today   no event - doing well    Diet: some    ROS:  No sob    Data:     Current meds:    apixaban  2.5 mg Oral BID    clopidogrel  75 mg Oral Daily    potassium chloride  20 mEq Intravenous Once    meropenem  1 g Intravenous Q8H    amLODIPine  5 mg Oral Daily    sodium chloride flush  5 mL Intracatheter BID    lidocaine  1 patch Transdermal Daily    amantadine  100 mg Oral BID    escitalopram  10 mg Oral Nightly    rivastigmine  1.5 mg Oral BID    rosuvastatin  40 mg Oral QAM    bicalutamide  50 mg Oral Daily    sodium chloride flush  10 mL Intravenous 2 times per day    carbidopa-levodopa  2 tablet Oral 4x Daily    And    entacapone  200 mg Oral 4x Daily           I/O last 3 completed shifts: In: 345 [P.O.:240; I.V.:5; IV Piggyback:100]  Out: 400 [Urine:400]    CBC: No results for input(s): WBC, HGB, PLT in the last 72 hours. Recent Labs     08/28/20  0413 08/29/20  0441 08/30/20  0510    135 136   K 4.4 4.4 4.1    101 102   CO2 26 25 25   BUN 17 17 13   CREATININE 1.0 1.1 1.0   GLUCOSE 87 93 93       Lab Results   Component Value Date    CALCIUM 9.7 08/30/2020    PHOS 2.5 08/30/2020       Objective:     Vitals: /61   Pulse 68   Temp 97.3 °F (36.3 °C) (Oral)   Resp 16   Ht 6' 1\" (1.854 m)   Wt 134 lb 8 oz (61 kg)   SpO2 100%   BMI 17.75 kg/m²     General appearance: Thin , no ac distress  HEENT:  + conj pallor  Neck:  supple  Lungs:  No crackles- coarse BS  Heart:  Seems RRR  Abdomen: soft, non tender   Extremities:  Mild pedal edema       Problem List :         Impression :     1. SLIM- CKD stage 3 - recovering - c peaked 2.0  2. abd /abscess- polymicrobial- S/p drain and abx   3. ? H/O scleroderma / prostate ca   4.  PEW     Recommendation/Plan  :     1. Looks like by the time I am wring note - he has been discharged 2. He is on abx/ DO Ac and BP med's so has risk for recurrent SLIM   3. No NSAID   4.  Out p f/U       Cloteal Early MD

## 2020-08-30 NOTE — PLAN OF CARE
Problem: Mobility - Impaired:  Goal: Mobility will improve  Description: Mobility will improve  8/30/2020 0103 by Juanito Alvarado RN  Outcome: Ongoing  8/29/2020 1128 by Belgica Dao RN  Outcome: Ongoing     Problem: Mental Status - Impaired:  Goal: Mental status will improve  Description: Mental status will improve  8/30/2020 0103 by Juanito Alvarado RN  Outcome: Ongoing  8/29/2020 1128 by Belgica Dao RN  Outcome: Ongoing

## 2020-08-30 NOTE — CARE COORDINATION
[employer] [employer] requires a bedside commode due to being confined to a single room, and is physically incapable of utilizing regular toilet facilities. Current body weight is Weight: 134 lb 8 oz (61 kg).

## 2020-08-30 NOTE — PROGRESS NOTES
NEUROLOGY NOTE  DR. Sagar Flores MD.  -------------------------------------------------  Subjective:    no new symptoms    Denies any side effects    Doing better. Denies any new symptoms. Denies headache nausea vomiting dizziness    Denies numbness weakness extremities    Denies blurring of vision double vision    Objective:    /61   Pulse 68   Temp 97.3 °F (36.3 °C) (Oral)   Resp 16   Ht 6' 1\" (1.854 m)   Wt 134 lb 8 oz (61 kg)   SpO2 100%   BMI 17.75 kg/m²   HEENT nl      Neuro exam    Alert Oriented  X 2  Follow simple commands  EOMI Pupils 3 mm lupillo  4+/5 all 4 extremities-positive cogwheel rigidity-bradykinesia      RADIOLOGY  -----------------    Ct Abdomen Pelvis Wo Contrast Additional Contrast? None    Result Date: 8/16/2020  EXAMINATION: CT OF THE ABDOMEN AND PELVIS WITHOUT CONTRAST 8/14/2020 9:52 pm TECHNIQUE: CT of the abdomen and pelvis was performed without the administration of intravenous contrast. Multiplanar reformatted images are provided for review. Dose modulation, iterative reconstruction, and/or weight based adjustment of the mA/kV was utilized to reduce the radiation dose to as low as reasonably achievable. COMPARISON: None. HISTORY: ORDERING SYSTEM PROVIDED HISTORY: nausea, vomiting, abdominal pain TECHNOLOGIST PROVIDED HISTORY: Reason for exam:->nausea, vomiting, abdominal pain Additional Contrast?->None Reason for Exam: nausea, vomiting, abdominal pain Acuity: Acute Type of Exam: Initial FINDINGS: Lower Chest: There is dependent consolidation in the lungs. Organs: There is mild right hydronephrosis. There is no obstructing stone. No acute abnormality of the liver, spleen, pancreas, adrenals, gallbladder, or left kidney. GI/Bowel: Bowel caliber is normal.  There is no evidence of active bowel inflammation. There is no evidence of acute appendicitis. Pelvis: In the right pelvic sidewall is a 56 x 67 x 66 mm (approximately) ill-defined mass adjacent to the iliac vessels. TECHNOLOGIST PROVIDED HISTORY: Reason for exam:->lethargic Has a \"code stroke\" or \"stroke alert\" been called? ->No Reason for Exam: lethargic Acuity: Acute Type of Exam: Initial FINDINGS: Patient motion in some areas, partially limiting evaluation of those areas. BRAIN/VENTRICLES:  No masses nor acute intracranial hemorrhage. Intact gray/white matter differentiation without findings of acute ischemia. No mass effect nor midline shift. Patent basilar cisterns and foramen magnum. No hydrocephalus. Age-appropriate mild to moderate diffuse atrophy. Mild to moderate subcortical, deep, and periventricular white matter hypodensities likely due to chronic small vessel ischemia with additional pontine involvement. ORBITS:  Bilateral lens implants. Otherwise normal without acute abnormality. SINUSES:  Normally pneumatized and aerated. SOFT TISSUES/SKULL:  No acute soft tissue abnormality. Moderate atherosclerotic calcifications. No acute fracture. 1. No acute intracranial abnormality. 2. Chronic changes as above. Xr Chest Portable    Result Date: 8/21/2020  EXAMINATION: ONE XRAY VIEW OF THE CHEST 8/21/2020 12:02 pm COMPARISON: 08/16/2020 HISTORY: ORDERING SYSTEM PROVIDED HISTORY: SOb, requiring O2 support TECHNOLOGIST PROVIDED HISTORY: Reason for exam:->SOb, requiring O2 support Reason for Exam: SOb, requiring O2 support FINDINGS: The heart and mediastinal structures are stable. Worsening bilateral airspace disease is noted consistent with pneumonia. No significant pleural effusion is seen. Worsening bilateral airspace disease. Xr Chest 1 View    Result Date: 8/16/2020  EXAMINATION: ONE XRAY VIEW OF THE CHEST 8/16/2020 10:07 am COMPARISON: CT abdomen/pelvis 08/14/2020. HISTORY: ORDERING SYSTEM PROVIDED HISTORY: hypoxia and fever TECHNOLOGIST PROVIDED HISTORY: Reason for exam:->hypoxia and fever Acuity: Acute Type of Exam: Subsequent/Follow-up FINDINGS: Single view provided. Moderate rotation. Mediastinal and cardiac silhouettes are normal.  The lung volumes are normal.  There are bilateral symmetric basilar predominant ground-glass densities with mild interstitial opacities. No lobar consolidation. No large effusion or pneumothorax. No free subdiaphragmatic air. Bilateral symmetric interstitial opacities more prominent lung bases. This could represent possible edema versus atypical pneumonia. Vl Dup Carotid Bilateral    Result Date: 8/28/2020  EXAMINATION: ULTRASOUND EVALUATION OF THE CAROTID ARTERIES 8/28/2020 COMPARISON: None. HISTORY: ORDERING SYSTEM PROVIDED HISTORY: r/o carotid stenosis TECHNOLOGIST PROVIDED HISTORY: Reason for exam:->r/o carotid stenosis Reason for Exam: slurred speech Acuity: Acute Type of Exam: Initial Additional signs and symptoms: nk Relevant Medical/Surgical History: hx of Parkinsen's FINDINGS: RIGHT: The right common carotid artery demonstrates peak systolic velocities of 91 and 96 cm/sec in the proximal and distal segments respectively. The right internal carotid artery demonstrates the systolic velocities of 74, 73, and 66 cm/sec in the proximal, mid and distal segments respectively. The external carotid artery is patent. The vertebral artery demonstrates normal antegrade flow. Mild calcified shadowing plaque involving the bifurcation and bulb. ICA/CCA ratio of 0.8. LEFT: The left common carotid artery demonstrates peak systolic velocities of 895 and 95 cm/sec in the proximal and distal segments respectively. The left internal carotid artery demonstrates the systolic velocities of 69, 89, and 84 cm/sec in the proximal, mid and distal segments respectively. The external carotid artery is patent. The vertebral artery demonstrates normal antegrade flow. Moderate severe calcified atherosclerotic plaque involving the bifurcation and bulb. ICA/CCA ratio of 0.9. The right internal carotid artery demonstrates 0-50% stenosis .  The left internal carotid artery demonstrates 0-50% stenosis . Bilateral vertebral arteries are patent with flow in the normal direction. Calcified atherosclerotic plaque at the carotid bifurcations and bulbs, left greater than right. Vl Dup Upper Extremity Venous Right    Result Date: 8/24/2020  EXAMINATION: DUPLEX ULTRASOUND OF THE RIGHT UPPER EXTREMITY FOR DVT, 8/24/2020 2:52 pm TECHNIQUE: Duplex ultrasound and Doppler images were obtained of the deep venous structures of the upper extremity. COMPARISON: None. HISTORY: ORDERING SYSTEM PROVIDED HISTORY: CLOT TECHNOLOGIST PROVIDED HISTORY: Reason For Exam Priority: STAT To r/o DVT Comments:  During UltraSound examination of vasculature probable DVT noted in RUE anterior mid forearm - line placed in upper arm in another vessel. RN notified and will contact Physician for further orders. Reason for exam:->CLOT Acuity: Acute Type of Exam: Initial FINDINGS: Nonocclusive echogenic thrombus is seen in the right axillary, brachial, basilic, and radial veins. No color Doppler flow is appreciated in the ulnar vein. The visualized portions of the internal jugular and subclavian veins appear patent. Nonocclusive deep vein thrombosis of the right axillary, brachial, and radial veins. Occlusive thrombosis of the ulnar vein. Nonocclusive superficial vein thrombosis of the basilic vein. Findings discussed with Dr. Stephon Diehl by Dr. Cinthia Ferro on 08/24/2020 at 1550 hours. Ct Urogram    Result Date: 8/20/2020  EXAMINATION: CT UROGRAM 8/18/2020 9:39 am TECHNIQUE: CT of the abdomen and pelvis was performed before and after the administration of intravenous contrast as per CT urogram protocol. Multiplanar reformatted images as well as MIP urogram images are provided for review. Dose modulation, iterative reconstruction, and/or weight based adjustment of the mA/kV was utilized to reduce the radiation dose to as low as reasonably achievable.  COMPARISON: 08/14/2020 HISTORY: ORDERING SYSTEM PROVIDED HISTORY: eval the abnormality noted on CT - and please do a 5 minutes delay urogram TECHNOLOGIST PROVIDED HISTORY: Reason for exam:->eval the abnormality noted on CT - and please do a 5 minutes delay urogram Reason for exam:->Please do a 5 min delkay urogram Reason for Exam: abd pain;urinary retention Acuity: Unknown Type of Exam: Unknown Additional signs and symptoms: pt moved too much during CT scan,unable to follow directions;AMS;eval abnormality noted on CT FINDINGS: Overall exam is moderately limited due to motion artifact. Lower Chest: New trace bilateral pleural effusions with worsening bibasilar ground-glass and consolidative opacities. Kidneys and Urinary Tract: No urinary tract calculus. There is delayed excretion of contrast material into the right renal collecting system. There is mild right hydroureteronephrosis extending to the level of the pelvis. The kidneys otherwise enhance symmetrically. Probable bilateral renal cysts. No other definite urinary tract filling defect. The left ureter is not well opacified on the postcontrast images. Renee catheter within a decompressed bladder. Organs: The liver, spleen, pancreas, gallbladder and adrenal glands are grossly unremarkable. No biliary duct dilation. GI/Bowel: No dilated loops of bowel or bowel wall thickening. No definite free air. There is a tubular thick-walled fluid-filled structure which extends from the cecum (best seen on series 604 image 70) felt to be the appendix. The tip of the appendix appears to communicate with a complex 5.5 x 4.7 cm collection which demonstrates mild peripheral enhancement. This abuts and likely extends into the right iliopsoas muscle and likely abuts and or encases the right external iliac vessels. This collection also obstructs the right ureter. Pelvis: Status post prostatectomy. No pathologic enlarged adenopathy is otherwise identified. No other significant free fluid.  Peritoneum/Retroperitoneum: The aorta is normal in caliber with moderate to severe atherosclerosis. No retroperitoneal or mesenteric adenopathy. No definite ascites or drainable fluid collection. Bones/Soft Tissues: No acute findings in the bones or soft tissues. Diffuse osteopenia. 1. There is a complex 5.5 x 4.7 cm collection in the right pelvis with surrounding mild peripheral enhancement extends into the right iliopsoas muscle and likely abuts and encases the right external iliac vessels. There is also obstruction of the right ureter resulting in mild hydroureteronephrosis. There is a fluid-filled thick-walled structure extending from the cecum towards this collection raising suspicion for perforated appendicitis with associated phlegmon/abscess. Less likely considerations include hematoma or malignancy given history of prostate cancer. 2. Status post prostatectomy. Otherwise no obvious urinary tract filling defect or calculus given limitations due to motion artifact. Results were called by Dr. Bao Solomon. Alex Sinha MD to Dm Andi on 8/18/2020 at 14:15. Ir Change Drainage Cath    Result Date: 8/28/2020  PROCEDURE: IR CHANGE DRAINAGE CATH 8/28/2020 HISTORY: ORDERING SYSTEM PROVIDED HISTORY: Remove KANA Drain TECHNOLOGIST PROVIDED HISTORY: Reason for exam:->Remove KANA Drain TECHNIQUE: 0.3 minutes with 4 fluoroscopic images saved. AK 15. CONTRAST: 5 cc of Omnipaque SEDATION: None FLUOROSCOPY DOSE AND TYPE OR TIME AND EXPOSURES: 0.3 minutes with 4 fluoroscopic images saved. AK 15. DESCRIPTION OF PROCEDURE: Informed consent was obtained after a detailed explanation of the procedure including risks, benefits, and alternatives. Universal protocol was observed. Under sterile technique, dilute contrast was injected into the catheter. There was no significant collection or fistulous communication noted. Afterwards under sterile technique the catheter was removed and a sterile dressing applied. There was no bleeding or hematoma noted.   The patient tolerated the procedure well. FINDINGS: No signs for a significant residual cavity. No significant residual cavity or fistula noted. The catheter was removed. Ct Limited Follow Up    Result Date: 8/19/2020  EXAMINATION: CT LIMITED/FOLLOW UP 8/19/2020 1:17 pm TECHNIQUE: Dose modulation, iterative reconstruction, and/or weight based adjustment of the mA/kV was utilized to reduce the radiation dose to as low as reasonably achievable. A preprocedural CT was performed to help determine the needle course. Of note, the patient did exhibit significant confusion and was not able to remain still throughout the procedure. 1% subcutaneous lidocaine was instilled at the expected needle entry site. Next, an V[address] 24 needle was placed superficially in the right hip. The patient was again asked to remain still but was unable to. The patient was given moderate sedation, which did not limit his movements. At this point, I did not feel that it was safe to proceed with simple moderate sedation due to the vascular structures in the region of the needle path. Next, Dr. Mateo Thomas was contacted regarding the clinical scenario and recommended contacting anesthesia for assistance. Anesthesia was unavailable to assist with today's procedure and so it was rescheduled for tomorrow and Dr. Mateo Thomas was informed of this. The needle was removed and the patient suffered no immediate complications. EBL: Less than 5 cc COMPARISON: CT urogram 08/18/2020 HISTORY: ORDERING SYSTEM PROVIDED HISTORY: pelvic mass TECHNOLOGIST PROVIDED HISTORY: Reason for exam:->pelvic mass Reason for Exam: abcess drainage Patient has a lesion in the right lower quadrant, which is likely a multiloculated abscess. A mass is also a consideration, but less likely. The patient is currently confused and so the patient's wife was consented for the procedure. Plan to place a drain in the collection and/or aspiration.  ASA 3 Mallampati 2 FINDINGS: No significant change in the right lower quadrant approximately 6.3 x 4.4 cm mixed density collection anterior to the right iliac vessels. Unsuccessful CT-guided drain attempt. RECOMMENDATIONS: Will re-attempt the procedure with the assistance of anesthesia. Mri Brain Wo Contrast    Result Date: 8/28/2020  EXAMINATION: MRI OF THE BRAIN WITHOUT CONTRAST  8/28/2020 3:21 pm TECHNIQUE: Multiplanar multisequence MRI of the brain was performed without the administration of intravenous contrast. COMPARISON: 04/09/2018 HISTORY: ORDERING SYSTEM PROVIDED HISTORY: change in speech TECHNOLOGIST PROVIDED HISTORY: Reason for exam:->change in speech FINDINGS: INTRACRANIAL STRUCTURES/VENTRICLES: There is no acute infarct or mass. There is stable diffuse parenchymal volume loss with mild-to-moderate chronic white matter microvascular ischemic changes. No mass effect or midline shift. No evidence of an acute intracranial hemorrhage. The ventricles and sulci are normal in size and configuration. The sellar/suprasellar regions appear unremarkable. The normal signal voids within the major intracranial vessels appear maintained. ORBITS: The visualized portion of the orbits demonstrate no acute abnormality. SINUSES: Paranasal sinuses are well aerated. Right mastoid effusion is noted. No left mastoid effusion is evident. BONES/SOFT TISSUES: The bone marrow signal intensity appears normal. The soft tissues demonstrate no acute abnormality. 1. No acute intracranial abnormality. 2. Diffuse parenchymal volume loss with mild-to-moderate chronic white matter microvascular ischemic changes. 3. Right mastoid effusion. Us Retroperitoneal Limited    Result Date: 8/25/2020  EXAMINATION: ULTRASOUND OF THE KIDNEYS 8/25/2020 9:28 am COMPARISON: None.  HISTORY: ORDERING SYSTEM PROVIDED HISTORY: hydronephrosis TECHNOLOGIST PROVIDED HISTORY: Reason for exam:->hydronephrosis Reason for Exam: reevaluate mild hydro Acuity: Acute Type of Exam: Subsequent/Follow-up FINDINGS: The right kidney measures 9.4 cm in length and the left kidney measures 10.3 cm in length. Kidneys demonstrate normal cortical echogenicity. No hydronephrosis or intrarenal stones. There is a solitary cyst in the mid right kidney measuring 1.7 cm in diameter. There is a solitary cyst involving the mid left kidney measuring 2.2 cm in diameter. No other focal lesions. Unremarkable ultrasound of the kidneys. Specifically, no evidence of hydronephrosis on either side. Ct Drainage Hematoma/seroma/fluid Collection    Result Date: 8/20/2020  PROCEDURE: CT DRAINAGE With anesthesia 8/20/2020 HISTORY: ORDERING SYSTEM PROVIDED HISTORY: ABCESS TECHNOLOGIST PROVIDED HISTORY: Reason for exam:->ABCESS Reason for Exam: RLQ abcess Previously attempted right lower quadrant collection drainage was unsuccessful due to difficulty with sedating patient. Patient return for aspiration/drainage attempt with the assistance of anesthesia. TECHNIQUE: Dose modulation, iterative reconstruction, and/or weight based adjustment of the mA/kV was utilized to reduce the radiation dose to as low as reasonably achievable. CONTRAST: None SEDATION: Per anesthesia. See separate note. FLUOROSCOPY DOSE AND TYPE OR TIME AND EXPOSURES: DLP: 7996.74 mGy-cm DESCRIPTION OF PROCEDURE: Informed consent was obtained after a detailed explanation of the procedure including risks, benefits, and alternatives. Universal protocol was observed. The right hip was prepped and draped using standard aseptic technique. The expected needle course was anesthetized using 1% subcutaneous lidocaine. Next, under intermittent CT guidance, an 18 gauge Chiba needle was carefully advanced to the expected region of the right lower quadrant collection using anatomic landmarks. Initially, fluid could not be aspirated via the needle. A Abdullahi wire was passed and was noted to coil in the expected area of the collection.  Next, the tract was sequentially dilated to accommodate an 8 [place] drain. A total of approximately 15 cc of thick bloody/purulent was aspirated and sent for cytology, gram stain and culture. The drain was secured to the skin with suture. Drain was attached to a suction bulb. The patient tolerated the procedure well and there were no immediate complications. EBL: Less than 5 cc FINDINGS: 1.  8 British drain seen within the right lower quadrant, which yielded purulent material. 2.  No free air identified within the visualized abdomen. 3.  Previously noted thickening of the appendix and the size of the collection are not significantly changed, though evaluation is somewhat limited without IV contrast.     1.  Technically successful placement of an 8 [place] drain in the right lower quadrant collection. 2.  Aspiration of purulent material supports the diagnosis of ruptured appendicitis with abscess\phlegmon, though continued attention on follow-up is recommended. RECOMMENDATIONS: 1. Drain to bulb drainage. 2.  Flush right lower quadrant drain with 2-5 cc normal saline b.i.d. 3.  Record daily drain output.        LAB RESULTS  --------------------    Recent Results (from the past 24 hour(s))   Renal Function Panel    Collection Time: 08/30/20  5:10 AM   Result Value Ref Range    Sodium 136 135 - 145 MMOL/L    Potassium 4.1 3.5 - 5.1 MMOL/L    Chloride 102 99 - 110 mMol/L    CO2 25 21 - 32 MMOL/L    Anion Gap 9 4 - 16    BUN 13 6 - 23 MG/DL    CREATININE 1.0 0.9 - 1.3 MG/DL    Glucose 93 70 - 99 MG/DL    Calcium 9.7 8.3 - 10.6 MG/DL    GFR Non-African American >60 >60 mL/min/1.73m2    GFR African American >60 >60 mL/min/1.73m2    Alb 3.3 (L) 3.4 - 5.0 GM/DL    Phosphorus 2.5 2.5 - 4.9 MG/DL   Protime-INR    Collection Time: 08/30/20  5:10 AM   Result Value Ref Range    Protime 14.1 11.7 - 14.5 SECONDS    INR 1.16 INDEX         Medical problems    Patient Active Problem List:     Weight loss, unintentional     History of colon polyps Pelvic mass      ASSESSMENT:  ---------------------    TIA r/o carotid cardio embolic event     Severe Parkinsons disease     Metabolic encephalopathy     PLAN:     Mri brain no acute infarct     C doppler neg except atherosclerotic carotid arteries     Nl  folate TSH     Continue sinemet-amantadine     Continue asa      Plavix     Discussed dx prognosis meds side effects and above with pt and answered all questions. Discussed is stable neurologically.       Electronically signed by Shavonne Dela Cruz MD on 8/30/2020 at 5:04 PM

## 2020-08-30 NOTE — DISCHARGE SUMMARY
Hospital Medicine  DISCHARGE SUMMARY  Date: 8/30/2020  Name: Krystyna Pacheco  MRN: [id number]  YOB: 1940     Patient's PCP: Marie Flannery MD  Admit Date: 8/14/2020   Discharge Date: 8/30/2020  Admitting Physician: Nell Stringer MD  Discharge Physician: Betty Kincaid MD      Discharge Diagnoses:  Pelvic abscess s/p IR assisted drainage 8/20   Rt upper extremity DVT  Acute metabolic encephalopathy  Acute respiratory failure with Hypoxia  Pneumonia   SLIM   Moderate PCM  Prostate cancer S/p Prostatectomy  Anemia of Chronic disorders  Dementia with Parkinson's Dz    HPI:    Chief Complaint   Patient presents with    Abdominal Pain     sent to ED to R/O bowel obstruction. last BM 2 days ago and not passing much gas.  Urinary Retention     only urinated once today. having suprapubic pain     Patient's daughter reports that he has had poor p.o. intake for the past several days, he has been feeling very weak, having difficulty standing, no bowel movement for 3 days plus decreased urine output. Patient's PCP was concerned for possible bowel obstruction so advised patient to come to the ED. Labs showed elevated creatinine and ct abdomen showed a right pelvic sidewall mass measuring 5.6 x 6.7 x 6.6 cm concerning for possible malignancy. Hospital Course  Patient came in due to weakness and abdominal pain. CT of the abdomen pelvis showed a right pelvic mass with right hydronephrosis. CT urography showed a 5.5 x 4.7 cm collection in the right pelvis with mild peripheral enhancement extending into the right iliopsoas muscle as well as obstruction of the right ureter with hydroureteronephrosis and also fluid-filled thick-walled structure extending from the cecum towards this collection raising suspicion for perforated appendicitis with associated abscess. Patient was placed on IV antibiotics. General surgery and urology were following. Blood cultures were negative.   IR placed the drain into the collection. Cytology favored a benign process. General surgery felt that since mass was not a neoplastic process, it was likely related to a perforated appendicitis. Drain was removed. General surgery felt that patient should improve with antibiotic treatment. Patient was doing well. Patient had been on antibiotics for at least 11 days. He was given oral antibiotics for another 3 days with the end date being 9/2/2020 to complete a course of 2 weeks from the time of his drain placement. He was placed on Augmentin. When IV team was trying to place a right arm midline, patient was found to have a right upper extremity DVT. Venous duplex confirmed a DVT in the right upper arm. Patient was placed on therapeutic Lovenox. Case was discussed with his POA. Decision was made to put patient on Eliquis instead of warfarin. Risk and benefits were discussed. Due to patient's age and weight and risk for falling patient was placed on a lower dose of 2.5 mg twice a day. He will need to follow-up with his PCP. Patient had acute metabolic encephalopathy during his stay. It was likely due to the sepsis. POA was concerned about patient's speech while he was recovering. MRI of the brain showed no acute process. Neurology was concerned about patient possibly having a TIA. Neurology started Plavix and stopped aspirin. Change in speech may have been due to a combination of sepsis and Parkinson's. TSH was elevated however was borderline for patient's age. It was recommended for patient to get a repeat TSH in the outpatient setting with his PCP to decide on whether patient needs thyroid supplementation. Patient had acute respiratory failure with hypoxia during his stay. CXR showed worsening bilateral airspace disease. There was concern that dyspnea may have been due to pneumonia vs fluid. Patient was already on antibiotics for his pelvic abscess. Patient was also given Lasix.  Patient eventually was weaned off oxygen. Chest x-ray showed improvement in aeration of lungs. Patient was weaned back to room air. Breathing was stable. Patient had acute kidney injury during his stay. This was likely due to ATN from postobstructive uropathy due to the pelvic mass. Following drain of the substance, follow-up renal ultrasound showed no hydronephrosis. Acute kidney injury resolved during the course of his stay. Patient had moderate protein calorie malnutrition. Dietary was following and made recommendations. Patient was placed on amlodipine for his blood pressure. Patient was stable. He was discharged to home with home health. Physical Exam:  /61   Pulse 68   Temp 97.3 °F (36.3 °C) (Oral)   Resp 16   Ht 6' 1\" (1.854 m)   Wt 134 lb 8 oz (61 kg)   SpO2 100%   BMI 17.75 kg/m²   General Appearance: alert and awake in no apparent distress, sitting in chair  Cardiovascular: normal rate, regular rhythm, normal S1 and S2, no murmurs  Pulmonary/Chest: clear to auscultation bilaterally  Abdomen: soft, non-tender, non-distended,  Has dressing in RLQ  Extremities: no cyanosis, clubbing or edema, pulse   Skin: warm and dry, no rash or erythema  Head: normocephalic and atraumatic  Neck: able to turn head  Musculoskeletal: no edema, nontender  Neurological: awake and alert, appears to mumble, oriented to person and place    Consultations  IP CONSULT TO [address] TO HOSPITALIST  IP CONSULT TO UROLOGY  IP CONSULT TO NEPHROLOGY  IP CONSULT TO INTERVENTIONAL RADIOLOGY  IP CONSULT TO INTERVENTIONAL RADIOLOGY  IP CONSULT TO IV TEAM  IP CONSULT TO INTERVENTIONAL RADIOLOGY  IP CONSULT TO NEUROLOGY  IP CONSULT TO PHARMACY  IP CONSULT TO HOME CARE NEEDS    Invasive procedures:   8/20 CT DRAINAGE  8/28 Remove KANA Drain    Significant Diagnostic Studies:    Imaging  Ct Abdomen Pelvis Wo Contrast Additional Contrast? None  Result Date: 8/16/2020  Right pelvic mass.   Differential diagnosis includes subacute hematoma or infiltrative malignancy. Mild right hydronephrosis due to compression of the ureter by the mass. Bibasilar pulmonary consolidation or atelectasis. Xr Chest (2 Vw)  Result Date: 8/25/2020  Significant improvement in the aeration of the lungs suggesting diminishing pulmonary edema or pneumonia with some residual left basilar airspace disease. Ct Head Wo Contrast  Result Date: 8/15/2020  1. No acute intracranial abnormality. 2. Chronic changes as above. Xr Chest Portable  Result Date: 8/21/2020  Worsening bilateral airspace disease. Xr Chest 1 View  Result Date: 8/16/2020  Bilateral symmetric interstitial opacities more prominent lung bases. This could represent possible edema versus atypical pneumonia. Vl Dup Carotid Bilateral  Result Date: 8/28/2020  The right internal carotid artery demonstrates 0-50% stenosis . The left internal carotid artery demonstrates 0-50% stenosis . Bilateral vertebral arteries are patent with flow in the normal direction. Calcified atherosclerotic plaque at the carotid bifurcations and bulbs, left greater than right. Vl Dup Upper Extremity Venous Right  Result Date: 8/24/2020  Nonocclusive deep vein thrombosis of the right axillary, brachial, and radial veins. Occlusive thrombosis of the ulnar vein. Nonocclusive superficial vein thrombosis of the basilic vein. Findings discussed with Dr. Zayda Casanova by Dr. David Owens on 08/24/2020 at 1550 hours. Ct Urogram  Result Date: 8/20/2020  1. There is a complex 5.5 x 4.7 cm collection in the right pelvis with surrounding mild peripheral enhancement extends into the right iliopsoas muscle and likely abuts and encases the right external iliac vessels. There is also obstruction of the right ureter resulting in mild hydroureteronephrosis. There is a fluid-filled thick-walled structure extending from the cecum towards this collection raising suspicion for perforated appendicitis with associated phlegmon/abscess. Less likely considerations include hematoma or malignancy given history of prostate cancer. 2. Status post prostatectomy. Otherwise no obvious urinary tract filling defect or calculus given limitations due to motion artifact. Results were called by Dr. Bao Solomon. Alex Sinha MD to Dm Fears on 8/18/2020 at 14:15. Ir Change Drainage Cath  Result Date: 8/28/2020  No significant residual cavity or fistula noted. The catheter was removed. Ct Limited Follow Up  Result Date: 8/19/2020  Unsuccessful CT-guided drain attempt. RECOMMENDATIONS: Will re-attempt the procedure with the assistance of anesthesia. Mri Brain Wo Contrast  Result Date: 8/28/2020  1. No acute intracranial abnormality. 2. Diffuse parenchymal volume loss with mild-to-moderate chronic white matter microvascular ischemic changes. 3. Right mastoid effusion. Us Retroperitoneal Limited  Result Date: 8/25/2020  Unremarkable ultrasound of the kidneys. Specifically, no evidence of hydronephrosis on either side. Ct Drainage Hematoma/seroma/fluid Collection  Result Date: 8/20/2020  1. Technically successful placement of an 8 [place]an drain in the right lower quadrant collection. 2.  Aspiration of purulent material supports the diagnosis of ruptured appendicitis with abscess\phlegmon, though continued attention on follow-up is recommended. RECOMMENDATIONS: 1. Drain to bulb drainage. 2.  Flush right lower quadrant drain with 2-5 cc normal saline b.i.d. 3.  Record daily drain output.        Code Status:  Full Code      Discharge Medications:     Medication List      START taking these medications    amLODIPine 5 MG tablet  Commonly known as:  NORVASC  Take 1 tablet by mouth daily  Start taking on:  August 31, 2020     amoxicillin-clavulanate 875-125 MG per tablet  Commonly known as:  AUGMENTIN  Take 1 tablet by mouth 2 times daily for 3 days     apixaban 2.5 MG Tabs tablet  Commonly known as:  ELIQUIS  Take 1 tablet by mouth 2 times daily Medications: see computerized discharge medication list  Activity: activity as tolerated  Diet: DIET GENERAL; Dysphagia Minced and Moist  Dietary Nutrition Supplements: Standard Oral Supplement  Disposition: home with home health  Discharged Condition: Stable      The patient was seen and examined on day of discharge and this discharge summary is in conjunction with any daily progress note from day of discharge. Time spent on discharge is 33 minutes in the examination, evaluation, counseling and review of medications and discharge plan.     Isidra Higginbotham MD

## 2020-08-30 NOTE — CARE COORDINATION
Faxed [facility] order and AVS to [phone]. Instructed nursing to call and notify Rowan Worthington and she states pt's spouse is transporting pt home in private vehicle. Pt's spouse called and requested BSC. Obtained order and documentation/ faxed all to [address] [place] [address] at [phone].

## 2020-08-30 NOTE — PROGRESS NOTES
Patient discharging to home with [facility]. Order faxed to [facility]. Patient dressing and will go to the car by wheelchair, using gait belt to transfer to car. Wife Laura and daughter asked questions that were answered as we reviewed the discharge papers, follow up appointment to attend and also to obtain. 4 RX were faxed to [address] [place] [address]. Bed side commode was ordered by case management to deliver on Monday. Patient belongings accounted for and in patient's belongs bags. Left the unit at 1545.

## 2020-08-30 NOTE — PLAN OF CARE
Problem: Mobility - Impaired:  Goal: Mobility will improve  Description: Mobility will improve  8/30/2020 1421 by Vaughan Schirmer, RN  Outcome: Completed  8/30/2020 0103 by Tom Palacios RN  Outcome: Ongoing     Problem: Mental Status - Impaired:  Goal: Mental status will improve  Description: Mental status will improve  8/30/2020 1421 by Vaughan Schirmer, RN  Outcome: Completed  8/30/2020 0103 by Tom Palacios RN  Outcome: Ongoing

## 2020-09-01 LAB — VITAMIN B-12: >1500 PG/ML (ref 180–914)

## 2020-09-04 ENCOUNTER — OFFICE VISIT (OUTPATIENT)
Dept: BARIATRICS/WEIGHT MGMT | Age: 80
End: 2020-09-04
Payer: MEDICARE

## 2020-09-04 VITALS
TEMPERATURE: 98 F | BODY MASS INDEX: 17.86 KG/M2 | DIASTOLIC BLOOD PRESSURE: 50 MMHG | WEIGHT: 134.8 LBS | HEIGHT: 73 IN | RESPIRATION RATE: 12 BRPM | HEART RATE: 77 BPM | SYSTOLIC BLOOD PRESSURE: 98 MMHG

## 2020-09-04 PROBLEM — K35.32 PERFORATED APPENDICITIS: Status: ACTIVE | Noted: 2020-09-04

## 2020-09-04 PROCEDURE — 99214 OFFICE O/P EST MOD 30 MIN: CPT | Performed by: SURGERY

## 2020-09-04 RX ORDER — SODIUM, POTASSIUM,MAG SULFATES 17.5-3.13G
1 SOLUTION, RECONSTITUTED, ORAL ORAL ONCE
Qty: 1 KIT | Refills: 0 | Status: SHIPPED | OUTPATIENT
Start: 2020-09-04 | End: 2020-09-04

## 2020-09-04 ASSESSMENT — ENCOUNTER SYMPTOMS
PHOTOPHOBIA: 0
VOICE CHANGE: 0
COUGH: 0
BLOOD IN STOOL: 0
DIARRHEA: 0
SORE THROAT: 0
ABDOMINAL PAIN: 0
WHEEZING: 0
VOMITING: 0
TROUBLE SWALLOWING: 0
NAUSEA: 0
CONSTIPATION: 0
SHORTNESS OF BREATH: 0
ANAL BLEEDING: 0
COLOR CHANGE: 0

## 2020-09-04 NOTE — PROGRESS NOTES
GENERAL SURGERY OFFICE NOTE    SUBJECTIVE:    Patient presenting today referred from Sukh Rosado MD and No ref. provider found, for   Chief Complaint   Patient presents with    Follow-Up from Hospital     Pelvic mass, perforated appendicitis, drain placed seen in ED         HPI: Aftab Wright is a 79 y.o. male presenting in first, follow up 3 weeks post op drain placement for perforated appendix. ProcedureCt Drainage Hematoma/seroma/fluid Collection  Result Date: 8/20/2020  1. Technically successful placement of an 8 Bengali drain in the right lower quadrant collection. 2.  Aspiration of purulent material supports the diagnosis of ruptured appendicitis with abscess\phlegmon, though continued attention on follow-up is recommended. RECOMMENDATIONS: 1. Drain to bulb drainage. 2.  Flush right lower quadrant drain with 2-5 cc normal saline b.i.d. 3.  Record daily drain output. Pathology: Final Cytologic Diagnosis:   Right Lower Quadrant Pelvic Abscess Fluid cytology with cell   block:   -     Rare degenerated atypical cells, favor benign, see   comment. Reviewed by: SPIKE Duarte (ASCP)   Electronically Signed Out By Sylvia Hubbard MD   Comment:   Degenerated cells and cellular debris are seen with many   neutrophils. Rare intact cells are seen. Rare degenerated   cells with atypical features are seen. Wounds very nicely healing, no erythema, no induration, no purulent discharge. No constipation, BMs back to normal.    Thoroughly reviewed the patient's medical history, family history, social history and review of systems with the patient today in the office. Please see medical record for pertinent positives.       Past Medical History:   Diagnosis Date    Arthritis     bilateral hands    Cancer ([address]     prostate    Hyperlipidemia     Scleroderma ([address]     face skin is black in coloration        Past Surgical History:   Procedure Laterality Date    COLONOSCOPY  3/8/13 Insuffieient prep    COLONOSCOPY  10/6/2015    diverticulosis, polyps x8, ext hem,    COLONOSCOPY  08/10/2018    Polyps x5, Int hemorrhoids, diverticulosis    ENDOSCOPY, COLON, DIAGNOSTIC  10/6/2015    mild erosive esophagitis    PROSTATECTOMY  2011        Social History     Socioeconomic History    Marital status:      Spouse name: Not on file    Number of children: Not on file    Years of education: Not on file    Highest education level: Not on file   Occupational History    Not on file   Social Needs    Financial resource strain: Not on file    Food insecurity     Worry: Not on file     Inability: Not on file    Transportation needs     Medical: Not on file     Non-medical: Not on file   Tobacco Use    Smoking status: Never Smoker    Smokeless tobacco: Never Used   Substance and Sexual Activity    Alcohol use: Yes     Comment: rarely    Drug use: No    Sexual activity: Not on file   Lifestyle    Physical activity     Days per week: Not on file     Minutes per session: Not on file    Stress: Not on file   Relationships    Social connections     Talks on phone: Not on file     Gets together: Not on file     Attends Jain service: Not on file     Active member of club or organization: Not on file     Attends meetings of clubs or organizations: Not on file     Relationship status: Not on file    Intimate partner violence     Fear of current or ex partner: Not on file     Emotionally abused: Not on file     Physically abused: Not on file     Forced sexual activity: Not on file   Other Topics Concern    Not on file   Social History Narrative    Not on file        No Known Allergies     The patient has a family history of    Current Outpatient Medications   Medication Sig Dispense Refill    Na Sulfate-K Sulfate-Mg Sulf (SUPREP BOWEL PREP KIT) 17.5-3.13-1.6 GM/177ML SOLN Take 1 Bottle by mouth once for 1 dose 1 kit 0    amLODIPine (NORVASC) 5 MG tablet Take 1 tablet by mouth daily 30 tablet 0    apixaban (ELIQUIS) 2.5 MG TABS tablet Take 1 tablet by mouth 2 times daily 60 tablet 0    clopidogrel (PLAVIX) 75 MG tablet Take 1 tablet by mouth daily 30 tablet 0    bicalutamide (CASODEX) 50 MG chemo tablet Take 50 mg by mouth daily      rivastigmine (EXELON) 1.5 MG capsule Take 1.5 mg by mouth 2 times daily      polyethylene glycol (GLYCOLAX) powder Take 17 g by mouth daily as needed      Cholecalciferol (VITAMIN D3) 5000 units TABS Take 10,000 Units by mouth every morning      leuprolide (LUPRON DEPOT, 6-MONTH,) 45 MG injection Inject 45 mg into the muscle once      amantadine (SYMMETREL) 100 MG capsule Take 100 mg by mouth 2 times daily      ROSUVASTATIN CALCIUM PO Take 40 mg by mouth every morning      escitalopram (LEXAPRO) 10 MG tablet Take 10 mg by mouth nightly      carbidopa-levodopa-entacapone (STALEVO 200) -200 MG TABS per tablet Take 1 tablet by mouth 4 times daily      vitamin B-12 (CYANOCOBALAMIN) 1000 MCG tablet Take 5,000 mcg by mouth 2 times daily        No current facility-administered medications for this visit. Review of Systems   Constitutional: Negative for activity change, chills, diaphoresis and fever. HENT: Negative for sore throat, trouble swallowing and voice change. Eyes: Negative for photophobia and visual disturbance. Respiratory: Negative for cough, shortness of breath and wheezing. Cardiovascular: Negative for chest pain, palpitations and leg swelling. Gastrointestinal: Negative for abdominal pain, anal bleeding, blood in stool, constipation, diarrhea, nausea and vomiting. Endocrine: Negative for cold intolerance, heat intolerance, polydipsia and polyuria. Genitourinary: Negative for dysuria, frequency and hematuria. Musculoskeletal: Negative for joint swelling, myalgias and neck stiffness. Skin: Negative for color change and rash. Neurological: Negative for seizures, speech difficulty, light-headedness and numbness. Hematological: Negative for adenopathy. Does not bruise/bleed easily. OBJECTIVE:    BP (!) 98/50   Pulse 77   Temp 98 °F (36.7 °C) (Infrared)   Resp 12   Ht 6' 1\" (1.854 m)   Wt 134 lb 12.8 oz (61.1 kg)   BMI 17.78 kg/m²    Body mass index is 17.78 kg/m². Physical Exam  Vitals signs reviewed. Constitutional:       General: He is not in acute distress. Appearance: He is well-developed. He is not diaphoretic. HENT:      Head: Normocephalic and atraumatic. Eyes:      General: No scleral icterus. Conjunctiva/sclera: Conjunctivae normal.      Pupils: Pupils are equal, round, and reactive to light. Neck:      Musculoskeletal: Normal range of motion. Thyroid: No thyromegaly. Vascular: No JVD. Trachea: No tracheal deviation. Cardiovascular:      Rate and Rhythm: Normal rate and regular rhythm. Heart sounds: Normal heart sounds. No murmur. No friction rub. No gallop. Pulmonary:      Effort: Pulmonary effort is normal. No respiratory distress. Breath sounds: No stridor. No wheezing or rales. Chest:      Chest wall: No tenderness. Abdominal:      General: Bowel sounds are normal. There is no distension. Palpations: Abdomen is soft. There is no mass. Tenderness: There is no abdominal tenderness. There is no guarding or rebound. Musculoskeletal: Normal range of motion. General: No tenderness. Lymphadenopathy:      Cervical: No cervical adenopathy. Skin:     General: Skin is warm and dry. Coloration: Skin is not pale. Findings: No erythema or rash. Neurological:      Mental Status: He is alert and oriented to person, place, and time. Cranial Nerves: No cranial nerve deficit. Coordination: Coordination normal.   Psychiatric:         Behavior: Behavior normal.         Thought Content:  Thought content normal.         Judgment: Judgment normal.           Orders Placed This Encounter   Medications    Na Sulfate-K Sulfate-Mg Sulf (SUPREP BOWEL PREP KIT) 17.5-3.13-1.6 GM/177ML SOLN     Sig: Take 1 Bottle by mouth once for 1 dose     Dispense:  1 kit     Refill:  0     No orders of the defined types were placed in this encounter. ASSESSMENT & PLAN:    1. Perforated appendicitis         H/o perf appendicitis, in a 78 YO, needs a colonoscopy to r/o Malignancy, will do ASAP. Reviewed the films and meds with his son in law and agreed with Dr Syeda Yañez, of the limitation of Abx, and told him to call IF any SOB, or fevers, or any resp issues. Patient counseled on the risks, benefits, and alternatives of treatment plan at length while in the office today. Patient states an understanding and willingness to proceed with the plan. Follow Up:  Return in about 2 weeks (around 9/18/2020), or Colonoscopy.       Mady Elizabeth MD, FACS, FICS.    9/4/20

## 2020-09-10 ENCOUNTER — TELEPHONE (OUTPATIENT)
Dept: BARIATRICS/WEIGHT MGMT | Age: 80
End: 2020-09-10

## 2020-09-11 ENCOUNTER — TELEPHONE (OUTPATIENT)
Dept: BARIATRICS/WEIGHT MGMT | Age: 80
End: 2020-09-11

## 2020-09-11 NOTE — TELEPHONE ENCOUNTER
Is there a need for urgency with this or can it wait? Pt wanted to wait until he was feeling better, but if it was urgent they wanted to know.

## 2020-09-12 NOTE — TELEPHONE ENCOUNTER
I would NOT wait, because IF it is cancer waiting is not good, HOPEFULLY it is benign but unusual so we need to know.

## 2020-09-15 NOTE — TELEPHONE ENCOUNTER
Spoke with pt's wife. She stated she will call back once she talks to pt.  Advised he can go to hospital today to get his Covid test in order to have procedure done Thursday

## 2020-09-16 ENCOUNTER — ANESTHESIA EVENT (OUTPATIENT)
Dept: ENDOSCOPY | Age: 80
End: 2020-09-16
Payer: MEDICARE

## 2020-09-16 NOTE — ANESTHESIA PRE PROCEDURE
Outpatient Medications   Medication Sig Dispense Refill    amLODIPine (NORVASC) 5 MG tablet Take 1 tablet by mouth daily (Patient taking differently: Take 2.5 mg by mouth daily ) 30 tablet 0    apixaban (ELIQUIS) 2.5 MG TABS tablet Take 1 tablet by mouth 2 times daily 60 tablet 0    clopidogrel (PLAVIX) 75 MG tablet Take 1 tablet by mouth daily 30 tablet 0    bicalutamide (CASODEX) 50 MG chemo tablet Take 50 mg by mouth daily      rivastigmine (EXELON) 1.5 MG capsule Take 1.5 mg by mouth 2 times daily      polyethylene glycol (GLYCOLAX) powder Take 17 g by mouth daily as needed      Cholecalciferol (VITAMIN D3) 5000 units TABS Take 10,000 Units by mouth every morning      leuprolide (LUPRON DEPOT, 6-MONTH,) 45 MG injection Inject 45 mg into the muscle once      amantadine (SYMMETREL) 100 MG capsule Take 100 mg by mouth 2 times daily      ROSUVASTATIN CALCIUM PO Take 40 mg by mouth every morning      escitalopram (LEXAPRO) 10 MG tablet Take 10 mg by mouth nightly      carbidopa-levodopa-entacapone (STALEVO 200) -200 MG TABS per tablet Take 1 tablet by mouth 4 times daily      vitamin B-12 (CYANOCOBALAMIN) 1000 MCG tablet Take 5,000 mcg by mouth 2 times daily          Allergies:  No Known Allergies    Problem List:    Patient Active Problem List   Diagnosis Code    Weight loss, unintentional R63.4    History of colon polyps Z86.010    Pelvic mass R19.00    Perforated appendicitis K35.32       Past Medical History:        Diagnosis Date    [facility]     8/2020- \"has appt to see Dr Holli Mathur"    Anemia     Arthritis     bilateral hands    Cancer [facility])     prostate( per old chart dx in 2010-tx with prostatectomy\"could not have radiation or chemo due to his parkinson and his scleroderma\")follows with Dr Richards Hy Crohn's disease [facility])     \"was dx with crohns disease 18 yrs ago \"     Dementia ([facility]     hx per old chart    [place] (hard of hearing)     lup[facility] hearing aides    Hx of blood clots     per old chart dx with DVT right upper extemity with admission 8/2020    Hyperlipidemia     Hypotension     Metabolic encephalopathy     dx with admission 8/2020    Parkinson disease [facility])     hx per old chart\"was dx around 2010\"    Pelvic abscess in male [facility])     per old chart with admission 8/2020 dx with pelvic mass and drained on 8/20/2020 per IR dept\"they think the appendix ruputured and caused the abscess\"    Scleroderma ([address]     face skin is black in coloration    Wears glasses     Weight loss     \"in August ( 2020) lost 30 pounds in the 20 some days he was at the hospital\"       Past Surgical History:        Procedure Laterality Date    COLONOSCOPY  3/8/13    Insuffieient prep    COLONOSCOPY  10/6/2015    diverticulosis, polyps x8, ext hem,    COLONOSCOPY  08/10/2018    Polyps x5, Int hemorrhoids, diverticulosis    ENDOSCOPY, COLON, DIAGNOSTIC  10/6/2015    mild erosive esophagitis    PROSTATECTOMY  2011       Social History:    Social History     Tobacco Use    Smoking status: Never Smoker    Smokeless tobacco: Never Used   Substance Use Topics    Alcohol use: Not Currently     Comment: rarely                                Counseling given: Not Answered      Vital Signs (Current):   Vitals:    09/15/20 1112   Weight: 138 lb (62.6 kg)   Height: 6' (1.829 m)                                              BP Readings from Last 3 Encounters:   09/04/20 (!) 98/50   08/30/20 113/61   08/20/20 116/73       NPO Status:                                                                                 BMI:   Wt Readings from Last 3 Encounters:   09/04/20 134 lb 12.8 oz (61.1 kg)   08/30/20 134 lb 8 oz (61 kg)   08/10/18 172 lb 6.4 oz (78.2 kg)     Body mass index is 18.72 kg/m².     CBC:   Lab Results   Component Value Date    WBC 7.2 08/26/2020    RBC 3.57 08/26/2020    HGB 10.7 08/26/2020    HCT 34.9 08/26/2020    MCV 97.8 08/26/2020    RDW 14.3 08/26/2020     08/26/2020 CMP:   Lab Results   Component Value Date     08/30/2020    K 4.1 08/30/2020     08/30/2020    CO2 25 08/30/2020    BUN 13 08/30/2020    CREATININE 1.0 08/30/2020    GFRAA >60 08/30/2020    LABGLOM >60 08/30/2020    GLUCOSE 93 08/30/2020    PROT 6.8 08/14/2020    CALCIUM 9.7 08/30/2020    BILITOT 0.4 08/14/2020    ALKPHOS 98 08/14/2020    AST 11 08/14/2020    ALT <5 08/14/2020       POC Tests: No results for input(s): POCGLU, POCNA, POCK, POCCL, POCBUN, POCHEMO, POCHCT in the last 72 hours. Coags:   Lab Results   Component Value Date    PROTIME 14.1 08/30/2020    INR 1.16 08/30/2020    APTT 53.5 08/24/2020       HCG (If Applicable): No results found for: PREGTESTUR, PREGSERUM, HCG, HCGQUANT     ABGs: No results found for: PHART, PO2ART, RDE1HZZ, WPX1XEB, BEART, W9IODLFH     Type & Screen (If Applicable):  No results found for: LABABO, LABRH    Drug/Infectious Status (If Applicable):  No results found for: HIV, HEPCAB    COVID-19 Screening (If Applicable):   Lab Results   Component Value Date    COVID19 NOT DETECTED 08/16/2020         Anesthesia Evaluation    Airway: Mallampati: III        Dental:          Pulmonary:                              Cardiovascular:  Exercise tolerance: poor (<4 METS),   (+) hyperlipidemia         Beta Blocker:  Not on Beta Blocker         Neuro/Psych:   (+) neuromuscular disease: Parkinson's disease, psychiatric history:             ROS comment: Dementia GI/Hepatic/Renal:   (+) bowel prep,           Endo/Other:    (+) : arthritis:., malignancy/cancer. ROS comment: Prostate CA per old chart dx in 2010-tx with prostatectomy\" Abdominal:           Vascular:   + DVT, . Anesthesia Plan      MAC and general     ASA 3       Induction: intravenous. Anesthetic plan and risks discussed with patient. SURENDRA Reed - CRNA   9/16/2020       Pre Anesthesia Assessment complete.  Chart reviewed on 9/16/2020  Pre Anesthesia Evaluation complete. Anesthesia plan, risks, benefits, alternatives, and personnel discussed with patient and/or legal guardian. Patient and/or legal guardian verbalized an understanding and agreed to proceed. Anesthesia plan discussed with care team members and agreed upon.   SURENDRA Sprague - CRNA  9/17/2020

## 2020-09-17 ENCOUNTER — ANESTHESIA (OUTPATIENT)
Dept: ENDOSCOPY | Age: 80
End: 2020-09-17
Payer: MEDICARE

## 2020-09-17 ENCOUNTER — HOSPITAL ENCOUNTER (OUTPATIENT)
Age: 80
Setting detail: OUTPATIENT SURGERY
Discharge: HOME | End: 2020-09-17
Attending: SURGERY | Admitting: SURGERY
Payer: MEDICARE

## 2020-09-17 VITALS
HEIGHT: 72 IN | WEIGHT: 138 LBS | OXYGEN SATURATION: 99 % | RESPIRATION RATE: 14 BRPM | TEMPERATURE: 97.1 F | DIASTOLIC BLOOD PRESSURE: 67 MMHG | HEART RATE: 68 BPM | BODY MASS INDEX: 18.69 KG/M2 | SYSTOLIC BLOOD PRESSURE: 145 MMHG

## 2020-09-17 VITALS
SYSTOLIC BLOOD PRESSURE: 127 MMHG | DIASTOLIC BLOOD PRESSURE: 69 MMHG | RESPIRATION RATE: 20 BRPM | OXYGEN SATURATION: 100 %

## 2020-09-17 LAB
SARS-COV-2, NAAT: NOT DETECTED
SOURCE: NORMAL

## 2020-09-17 PROCEDURE — 2500000003 HC RX 250 WO HCPCS: Performed by: NURSE ANESTHETIST, CERTIFIED REGISTERED

## 2020-09-17 PROCEDURE — 7100000011 HC PHASE II RECOVERY - ADDTL 15 MIN: Performed by: SURGERY

## 2020-09-17 PROCEDURE — 3700000001 HC ADD 15 MINUTES (ANESTHESIA): Performed by: SURGERY

## 2020-09-17 PROCEDURE — 2580000003 HC RX 258: Performed by: SURGERY

## 2020-09-17 PROCEDURE — 6360000002 HC RX W HCPCS: Performed by: NURSE ANESTHETIST, CERTIFIED REGISTERED

## 2020-09-17 PROCEDURE — 7100000010 HC PHASE II RECOVERY - FIRST 15 MIN: Performed by: SURGERY

## 2020-09-17 PROCEDURE — C9803 HOPD COVID-19 SPEC COLLECT: HCPCS

## 2020-09-17 PROCEDURE — 3609027000 HC COLONOSCOPY: Performed by: SURGERY

## 2020-09-17 PROCEDURE — 3700000000 HC ANESTHESIA ATTENDED CARE: Performed by: SURGERY

## 2020-09-17 PROCEDURE — 2709999900 HC NON-CHARGEABLE SUPPLY: Performed by: SURGERY

## 2020-09-17 PROCEDURE — G0121 COLON CA SCRN NOT HI RSK IND: HCPCS | Performed by: SURGERY

## 2020-09-17 PROCEDURE — U0002 COVID-19 LAB TEST NON-CDC: HCPCS

## 2020-09-17 RX ORDER — SODIUM CHLORIDE, SODIUM LACTATE, POTASSIUM CHLORIDE, CALCIUM CHLORIDE 600; 310; 30; 20 MG/100ML; MG/100ML; MG/100ML; MG/100ML
INJECTION, SOLUTION INTRAVENOUS ONCE
Status: COMPLETED | OUTPATIENT
Start: 2020-09-17 | End: 2020-09-17

## 2020-09-17 RX ORDER — LIDOCAINE HYDROCHLORIDE 20 MG/ML
INJECTION, SOLUTION EPIDURAL; INFILTRATION; INTRACAUDAL; PERINEURAL PRN
Status: DISCONTINUED | OUTPATIENT
Start: 2020-09-17 | End: 2020-09-17 | Stop reason: SDUPTHER

## 2020-09-17 RX ORDER — PROPOFOL 10 MG/ML
INJECTION, EMULSION INTRAVENOUS PRN
Status: DISCONTINUED | OUTPATIENT
Start: 2020-09-17 | End: 2020-09-17 | Stop reason: SDUPTHER

## 2020-09-17 RX ADMIN — LIDOCAINE HYDROCHLORIDE 50 MG: 20 INJECTION, SOLUTION EPIDURAL; INFILTRATION; INTRACAUDAL; PERINEURAL at 10:35

## 2020-09-17 RX ADMIN — PROPOFOL 30 MG: 10 INJECTION, EMULSION INTRAVENOUS at 10:36

## 2020-09-17 RX ADMIN — PROPOFOL 30 MG: 10 INJECTION, EMULSION INTRAVENOUS at 10:54

## 2020-09-17 RX ADMIN — PROPOFOL 30 MG: 10 INJECTION, EMULSION INTRAVENOUS at 10:44

## 2020-09-17 RX ADMIN — PROPOFOL 30 MG: 10 INJECTION, EMULSION INTRAVENOUS at 10:49

## 2020-09-17 RX ADMIN — PROPOFOL 30 MG: 10 INJECTION, EMULSION INTRAVENOUS at 10:59

## 2020-09-17 RX ADMIN — LIDOCAINE HYDROCHLORIDE 50 MG: 20 INJECTION, SOLUTION EPIDURAL; INFILTRATION; INTRACAUDAL; PERINEURAL at 10:34

## 2020-09-17 RX ADMIN — PROPOFOL 50 MG: 10 INJECTION, EMULSION INTRAVENOUS at 10:34

## 2020-09-17 RX ADMIN — PROPOFOL 20 MG: 10 INJECTION, EMULSION INTRAVENOUS at 10:39

## 2020-09-17 RX ADMIN — PROPOFOL 20 MG: 10 INJECTION, EMULSION INTRAVENOUS at 11:04

## 2020-09-17 RX ADMIN — SODIUM CHLORIDE, POTASSIUM CHLORIDE, SODIUM LACTATE AND CALCIUM CHLORIDE: 600; 310; 30; 20 INJECTION, SOLUTION INTRAVENOUS at 10:30

## 2020-09-17 NOTE — PROGRESS NOTES
Patient returned to room from endoscopy, [facility] A+Ox4,  VSS (see doc flow), assessment completed as per doc flow. Patient has no c/o pain. Beverage offered. Call light in reach, bed in low position. RN to continue to monitor. Family at bedside.

## 2020-09-17 NOTE — ANESTHESIA POSTPROCEDURE EVALUATION
Department of Anesthesiology  Postprocedure Note    Patient: Angélica Sofia  MRN: [id number]  YOB: 1940  Date of evaluation: 9/17/2020  Time:  11:29 AM     Procedure Summary     Date:  09/17/20 Room / Location:  [address]    Anesthesia Start:  1030 Anesthesia Stop:      Procedure:  COLONOSCOPY DIAGNOSTIC (N/A ) Diagnosis:  (perforated appendix, appendicitis)    Surgeon:  Lynda Torres MD Responsible Provider:  SURENDRA Ruiz CRNA    Anesthesia Type:  MAC ASA Status:  3          Anesthesia Type: MAC    Edie Phase I:      Edie Phase II:      Last vitals: Reviewed and per EMR flowsheets.        Anesthesia Post Evaluation    Patient location during evaluation: bedside  Patient participation: complete - patient participated  Level of consciousness: sleepy but conscious  Pain score: 0  Airway patency: patent  Nausea & Vomiting: no nausea and no vomiting  Complications: no  Cardiovascular status: blood pressure returned to baseline and hemodynamically stable  Respiratory status: acceptable, room air and spontaneous ventilation  Hydration status: euvolemic

## 2020-09-18 NOTE — H&P
HISTORY AND PHYSICAL EXAM    Date of Admission:  9/17/2020    PCP:  Sara Lowery MD    Chief Complaint / History of Present Illness:  Carter Ruffin is a very pleasant 79 y.o. male who presents for a colonoscopy since he had perforated appendicitis. Needs a colonoscopy to r/o neoplastic path. PMH:   has a past medical history of SLIM (acute kidney injury) ([address] Anemia, Arthritis, Cancer ([address] Crohn's disease ([address] Dementia ([address] [place] (hard of hearing), blood clots, Hyperlipidemia, Hypotension, Metabolic encephalopathy, Parkinson disease ([address] Pelvic abscess in [facility]), Scleroderma ([address] Wears glasses, and Weight loss. PSH:   has a past surgical history that includes Prostatectomy (2011); Colonoscopy (3/8/13); Colonoscopy (10/6/2015); Colonoscopy (08/10/2018); Endoscopy, colon, diagnostic (10/6/2015); and Colonoscopy (N/A, 9/17/2020). Allergies:  No Known Allergies     Home Meds:    Prior to Admission medications    Medication Sig Start Date End Date Taking?  Authorizing Provider   amLODIPine (NORVASC) 5 MG tablet Take 1 tablet by mouth daily  Patient taking differently: Take 2.5 mg by mouth daily  8/31/20  Yes Lani Alexandra MD   clopidogrel (PLAVIX) 75 MG tablet Take 1 tablet by mouth daily 8/31/20  Yes Lani Alexandra MD   bicalutamide (CASODEX) 50 MG chemo tablet Take 50 mg by mouth daily   Yes Historical Provider, MD   rivastigmine (EXELON) 1.5 MG capsule Take 1.5 mg by mouth 2 times daily   Yes Historical Provider, MD   polyethylene glycol (GLYCOLAX) powder Take 17 g by mouth daily as needed   Yes Historical Provider, MD   Cholecalciferol (VITAMIN D3) 5000 units TABS Take 10,000 Units by mouth every morning   Yes Historical Provider, MD   amantadine (SYMMETREL) 100 MG capsule Take 100 mg by mouth 2 times daily   Yes Historical Provider, MD   ROSUVASTATIN CALCIUM PO Take 40 mg by mouth every morning   Yes Historical Provider, MD   escitalopram (LEXAPRO) 10 MG tablet Take 10 mg by mouth nightly Yes Historical Provider, MD   carbidopa-levodopa-entacapone (STALEVO 200) -200 MG TABS per tablet Take 1 tablet by mouth 4 times daily   Yes Historical Provider, MD   vitamin B-12 (CYANOCOBALAMIN) 1000 MCG tablet Take 5,000 mcg by mouth 2 times daily    Yes Historical Provider, MD   apixaban (ELIQUIS) 2.5 MG TABS tablet Take 1 tablet by mouth 2 times daily 8/30/20   Jose C Abbott MD   leuprolide (LUPRON DEPOT, 6-MONTH,) 45 MG injection Inject 45 mg into the muscle once    Historical Provider, MD        [facility] Meds:    No current facility-administered medications for this encounter.       Current Outpatient Medications   Medication Sig Dispense Refill    amLODIPine (NORVASC) 5 MG tablet Take 1 tablet by mouth daily (Patient taking differently: Take 2.5 mg by mouth daily ) 30 tablet 0    clopidogrel (PLAVIX) 75 MG tablet Take 1 tablet by mouth daily 30 tablet 0    bicalutamide (CASODEX) 50 MG chemo tablet Take 50 mg by mouth daily      rivastigmine (EXELON) 1.5 MG capsule Take 1.5 mg by mouth 2 times daily      polyethylene glycol (GLYCOLAX) powder Take 17 g by mouth daily as needed      Cholecalciferol (VITAMIN D3) 5000 units TABS Take 10,000 Units by mouth every morning      amantadine (SYMMETREL) 100 MG capsule Take 100 mg by mouth 2 times daily      ROSUVASTATIN CALCIUM PO Take 40 mg by mouth every morning      escitalopram (LEXAPRO) 10 MG tablet Take 10 mg by mouth nightly      carbidopa-levodopa-entacapone (STALEVO 200) -200 MG TABS per tablet Take 1 tablet by mouth 4 times daily      vitamin B-12 (CYANOCOBALAMIN) 1000 MCG tablet Take 5,000 mcg by mouth 2 times daily       apixaban (ELIQUIS) 2.5 MG TABS tablet Take 1 tablet by mouth 2 times daily 60 tablet 0    leuprolide (LUPRON DEPOT, 6-MONTH,) 45 MG injection Inject 45 mg into the muscle once         Social History / Family History:        Social History     Socioeconomic History    Marital status:      Spouse name: None    Number of children: None    Years of education: None    Highest education level: None   Occupational History    None   Social Needs    Financial resource strain: None    Food insecurity     Worry: None     Inability: None    Transportation needs     Medical: None     Non-medical: None   Tobacco Use    Smoking status: Never Smoker    Smokeless tobacco: Never Used   Substance and Sexual Activity    Alcohol use: Not Currently     Comment: rarely    Drug use: No    Sexual activity: None   Lifestyle    Physical activity     Days per week: None     Minutes per session: None    Stress: None   Relationships    Social connections     Talks on phone: None     Gets together: None     Attends Zoroastrian service: None     Active member of club or organization: None     Attends meetings of clubs or organizations: None     Relationship status: None    Intimate partner violence     Fear of current or ex partner: None     Emotionally abused: None     Physically abused: None     Forced sexual activity: None   Other Topics Concern    None   Social History Narrative    None      Family History   Problem Relation Age of Onset    Heart Disease Mother     Heart Disease Father     Cancer Father         colon cancer    otherwise irrelevant to this surgical issue. Review of Systems:  Constitutional: Negative for fever, chills, diaphoresis, appetite change and fatigue. HENT: Negative for sore throat, trouble swallowing and voice change. Respiratory: Negative for cough, positive for shortness of breath no wheezing. Cardiovascular: Negative for chest pain positive for SOB with one flight of stairs exertion, no pitting LE edema. Gastrointestinal: Negative for nausea, vomiting, diarrhea, constipation, blood in stool, abdominal distention, anal bleeding or rectal pain. Musculoskeletal: Negative for joint swelling and arthralgias. Skin: Warm and dry, well perfused.   Neurological: Negative for seizures, syncope, speech difficulty and weakness. Hematological/Lymphatic: Negative for adenopathy. No history of DVT/PE. Does not bruise/bleed easily. Psychiatric/Behavioral: Negative for agitation. All others reviewed and negative. Physical Exam:  Vital Signs:   Vitals:    09/17/20 1235   BP: (!) 145/67   Pulse: 68   Resp: 14   Temp: 97.1 °F (36.2 °C)   SpO2: 99%     Body mass index is 18.72 kg/m². General appearance: Pt Alert and oriented, to persons place and time, in no apparent acute distress. HEENT:  SRINATH, EOMI, Conjunctivae clear. No JVDs, Bruits, No thyroid-megaly. Lungs: No dyspnea. Clear to auscultation bilaterally. Heart: Regular rate and rhythm, S1, S2 normal, no murmur, rub or gallop. No legs edema. Abdomen: Non tender. Non distended. Positive bowel sounds. No hernias noted, no masses palpable. Extremities: Warm, well perfused, no cyanosis or edema. Skin: Skin color, texture, turgor normal.  Neurologic: Grossly normal, Cranial nerves from II to XII intact. Deep tendon reflexes normal.  Psychology: Mood stable. Lymph nodes: No palpable LN in the neck, abdomen, or groins. Radiologic / Imaging / TESTING  Xr Chest (2 Vw)    Result Date: 8/25/2020  EXAMINATION: TWO XRAY VIEWS OF THE CHEST 8/25/2020 7:20 pm COMPARISON: 08/21/2020 HISTORY: ORDERING SYSTEM PROVIDED HISTORY: hypoxia TECHNOLOGIST PROVIDED HISTORY: Reason for exam:->hypoxia Reason for Exam: hypoxia Acuity: Unknown Type of Exam: Initial Additional signs and symptoms: weakness Relevant Medical/Surgical History: prostate CA FINDINGS: The heart and mediastinal structures are stable. The aeration of the lungs has improved from prior exam compatible with resolving edema or pneumonia. There is some residual airspace disease left lung base. No significant pleural effusion is noted. Bones are osteopenic with arthritis of the left shoulder.      Significant improvement in the aeration of the lungs suggesting diminishing pulmonary edema or pneumonia with some residual left basilar airspace disease. Xr Chest Portable    Result Date: 8/21/2020  EXAMINATION: ONE XRAY VIEW OF THE CHEST 8/21/2020 12:02 pm COMPARISON: 08/16/2020 HISTORY: ORDERING SYSTEM PROVIDED HISTORY: SOb, requiring O2 support TECHNOLOGIST PROVIDED HISTORY: Reason for exam:->SOb, requiring O2 support Reason for Exam: SOb, requiring O2 support FINDINGS: The heart and mediastinal structures are stable. Worsening bilateral airspace disease is noted consistent with pneumonia. No significant pleural effusion is seen. Worsening bilateral airspace disease. Vl Dup Carotid Bilateral    Result Date: 8/28/2020  EXAMINATION: ULTRASOUND EVALUATION OF THE CAROTID ARTERIES 8/28/2020 COMPARISON: None. HISTORY: ORDERING SYSTEM PROVIDED HISTORY: r/o carotid stenosis TECHNOLOGIST PROVIDED HISTORY: Reason for exam:->r/o carotid stenosis Reason for Exam: slurred speech Acuity: Acute Type of Exam: Initial Additional signs and symptoms: nk Relevant Medical/Surgical History: hx of Parkinsen's FINDINGS: RIGHT: The right common carotid artery demonstrates peak systolic velocities of 91 and 96 cm/sec in the proximal and distal segments respectively. The right internal carotid artery demonstrates the systolic velocities of 74, 73, and 66 cm/sec in the proximal, mid and distal segments respectively. The external carotid artery is patent. The vertebral artery demonstrates normal antegrade flow. Mild calcified shadowing plaque involving the bifurcation and bulb. ICA/CCA ratio of 0.8. LEFT: The left common carotid artery demonstrates peak systolic velocities of 388 and 95 cm/sec in the proximal and distal segments respectively. The left internal carotid artery demonstrates the systolic velocities of 69, 89, and 84 cm/sec in the proximal, mid and distal segments respectively. The external carotid artery is patent. The vertebral artery demonstrates normal antegrade flow.  Moderate severe calcified atherosclerotic plaque involving the bifurcation and bulb. ICA/CCA ratio of 0.9. The right internal carotid artery demonstrates 0-50% stenosis . The left internal carotid artery demonstrates 0-50% stenosis . Bilateral vertebral arteries are patent with flow in the normal direction. Calcified atherosclerotic plaque at the carotid bifurcations and bulbs, left greater than right. Vl Dup Upper Extremity Venous Right    Result Date: 8/24/2020  EXAMINATION: DUPLEX ULTRASOUND OF THE RIGHT UPPER EXTREMITY FOR DVT, 8/24/2020 2:52 pm TECHNIQUE: Duplex ultrasound and Doppler images were obtained of the deep venous structures of the upper extremity. COMPARISON: None. HISTORY: ORDERING SYSTEM PROVIDED HISTORY: CLOT TECHNOLOGIST PROVIDED HISTORY: Reason For Exam Priority: STAT To r/o DVT Comments:  During UltraSound examination of vasculature probable DVT noted in RUE anterior mid forearm - line placed in upper arm in another vessel. RN notified and will contact Physician for further orders. Reason for exam:->CLOT Acuity: Acute Type of Exam: Initial FINDINGS: Nonocclusive echogenic thrombus is seen in the right axillary, brachial, basilic, and radial veins. No color Doppler flow is appreciated in the ulnar vein. The visualized portions of the internal jugular and subclavian veins appear patent. Nonocclusive deep vein thrombosis of the right axillary, brachial, and radial veins. Occlusive thrombosis of the ulnar vein. Nonocclusive superficial vein thrombosis of the basilic vein. Findings discussed with Dr. Shruti Garcia by Dr. Mirtha Solomon on 08/24/2020 at 1550 hours. Ir Change Drainage Cath    Result Date: 8/28/2020  PROCEDURE: IR CHANGE DRAINAGE CATH 8/28/2020 HISTORY: ORDERING SYSTEM PROVIDED HISTORY: Remove KANA Drain TECHNOLOGIST PROVIDED HISTORY: Reason for exam:->Remove KANA Drain TECHNIQUE: 0.3 minutes with 4 fluoroscopic images saved. AK 15.  CONTRAST: 5 cc of Omnipaque SEDATION: None FLUOROSCOPY DOSE AND TYPE OR TIME AND EXPOSURES: 0.3 minutes with 4 fluoroscopic images saved. AK 15. DESCRIPTION OF PROCEDURE: Informed consent was obtained after a detailed explanation of the procedure including risks, benefits, and alternatives. Universal protocol was observed. Under sterile technique, dilute contrast was injected into the catheter. There was no significant collection or fistulous communication noted. Afterwards under sterile technique the catheter was removed and a sterile dressing applied. There was no bleeding or hematoma noted. The patient tolerated the procedure well. FINDINGS: No signs for a significant residual cavity. No significant residual cavity or fistula noted. The catheter was removed. Mri Brain Wo Contrast    Result Date: 8/28/2020  EXAMINATION: MRI OF THE BRAIN WITHOUT CONTRAST  8/28/2020 3:21 pm TECHNIQUE: Multiplanar multisequence MRI of the brain was performed without the administration of intravenous contrast. COMPARISON: 04/09/2018 HISTORY: ORDERING SYSTEM PROVIDED HISTORY: change in speech TECHNOLOGIST PROVIDED HISTORY: Reason for exam:->change in speech FINDINGS: INTRACRANIAL STRUCTURES/VENTRICLES: There is no acute infarct or mass. There is stable diffuse parenchymal volume loss with mild-to-moderate chronic white matter microvascular ischemic changes. No mass effect or midline shift. No evidence of an acute intracranial hemorrhage. The ventricles and sulci are normal in size and configuration. The sellar/suprasellar regions appear unremarkable. The normal signal voids within the major intracranial vessels appear maintained. ORBITS: The visualized portion of the orbits demonstrate no acute abnormality. SINUSES: Paranasal sinuses are well aerated. Right mastoid effusion is noted. No left mastoid effusion is evident. BONES/SOFT TISSUES: The bone marrow signal intensity appears normal. The soft tissues demonstrate no acute abnormality. 1. No acute intracranial abnormality.  2. Diffuse parenchymal volume loss with mild-to-moderate chronic white matter microvascular ischemic changes. 3. Right mastoid effusion. Us Retroperitoneal Limited    Result Date: 8/25/2020  EXAMINATION: ULTRASOUND OF THE KIDNEYS 8/25/2020 9:28 am COMPARISON: None. HISTORY: ORDERING SYSTEM PROVIDED HISTORY: hydronephrosis TECHNOLOGIST PROVIDED HISTORY: Reason for exam:->hydronephrosis Reason for Exam: reevaluate mild hydro Acuity: Acute Type of Exam: Subsequent/Follow-up FINDINGS: The right kidney measures 9.4 cm in length and the left kidney measures 10.3 cm in length. Kidneys demonstrate normal cortical echogenicity. No hydronephrosis or intrarenal stones. There is a solitary cyst in the mid right kidney measuring 1.7 cm in diameter. There is a solitary cyst involving the mid left kidney measuring 2.2 cm in diameter. No other focal lesions. Unremarkable ultrasound of the kidneys. Specifically, no evidence of hydronephrosis on either side. Ct Drainage Hematoma/seroma/fluid Collection    Result Date: 8/20/2020  PROCEDURE: CT DRAINAGE With anesthesia 8/20/2020 HISTORY: ORDERING SYSTEM PROVIDED HISTORY: ABCESS TECHNOLOGIST PROVIDED HISTORY: Reason for exam:->ABCESS Reason for Exam: RLQ abcess Previously attempted right lower quadrant collection drainage was unsuccessful due to difficulty with sedating patient. Patient return for aspiration/drainage attempt with the assistance of anesthesia. TECHNIQUE: Dose modulation, iterative reconstruction, and/or weight based adjustment of the mA/kV was utilized to reduce the radiation dose to as low as reasonably achievable. CONTRAST: None SEDATION: Per anesthesia. See separate note. FLUOROSCOPY DOSE AND TYPE OR TIME AND EXPOSURES: DLP: 7929.88 mGy-cm DESCRIPTION OF PROCEDURE: Informed consent was obtained after a detailed explanation of the procedure including risks, benefits, and alternatives. Universal protocol was observed.  The right hip was prepped and draped using standard aseptic technique. The expected needle course was anesthetized using 1% subcutaneous lidocaine. Next, under intermittent CT guidance, an 18 gauge Chiba needle was carefully advanced to the expected region of the right lower quadrant collection using anatomic landmarks. Initially, fluid could not be aspirated via the needle. A Abdullahi wire was passed and was noted to coil in the expected area of the collection. Next, the tract was sequentially dilated to accommodate an 8 [place] drain. A total of approximately 15 cc of thick bloody/purulent was aspirated and sent for cytology, gram stain and culture. The drain was secured to the skin with suture. Drain was attached to a suction bulb. The patient tolerated the procedure well and there were no immediate complications. EBL: Less than 5 cc FINDINGS: 1.  8 Cypriot drain seen within the right lower quadrant, which yielded purulent material. 2.  No free air identified within the visualized abdomen. 3.  Previously noted thickening of the appendix and the size of the collection are not significantly changed, though evaluation is somewhat limited without IV contrast.     1.  Technically successful placement of an 8 Cypriot drain in the right lower quadrant collection. 2.  Aspiration of purulent material supports the diagnosis of ruptured appendicitis with abscess\phlegmon, though continued attention on follow-up is recommended. RECOMMENDATIONS: 1. Drain to bulb drainage. 2.  Flush right lower quadrant drain with 2-5 cc normal saline b.i.d. 3.  Record daily drain output. Labs:    No results found for this or any previous visit (from the past 24 hour(s)).       Diagnosis:  Patient Active Problem List   Diagnosis    Weight loss, unintentional    History of colon polyps    Pelvic mass    Perforated appendicitis           Assessment & Plan:    Perf appy, needs a colonoscopy, will proceed to r/o cecal abnormalities.     ____________________________________________    Micheline Sever, MD, FACS, FICS    9/18/2020  4:59 PM

## 2020-12-31 NOTE — PROGRESS NOTES
2 tablet Oral 4x Daily    And    entacapone  200 mg Oral 4x Daily      Infusions:   PRN Meds: cloNIDine, 0.1 mg, TID PRN  potassium chloride, 10 mEq, PRN  sodium chloride flush, 10 mL, PRN  acetaminophen, 650 mg, Q6H PRN    Or  acetaminophen, 650 mg, Q6H PRN  polyethylene glycol, 17 g, Daily PRN  promethazine, 12.5 mg, Q6H PRN    Or  ondansetron, 4 mg, Q6H PRN  morphine, 2 mg, Q4H PRN            Pertinent New Labs & Imaging Studies     CBC with Differential:    Lab Results   Component Value Date    WBC 7.2 08/26/2020    RBC 3.57 08/26/2020    HGB 10.7 08/26/2020    HCT 34.9 08/26/2020     08/26/2020    MCV 97.8 08/26/2020    MCH 30.0 08/26/2020    MCHC 30.7 08/26/2020    RDW 14.3 08/26/2020    SEGSPCT 64.7 08/25/2020    LYMPHOPCT 23.2 08/25/2020    MONOPCT 7.4 08/25/2020    BASOPCT 0.8 08/25/2020    MONOSABS 0.5 08/25/2020    LYMPHSABS 1.5 08/25/2020    EOSABS 0.2 08/25/2020    BASOSABS 0.1 08/25/2020    DIFFTYPE AUTOMATED DIFFERENTIAL 08/25/2020     CMP:    Lab Results   Component Value Date     08/28/2020    K 4.4 08/28/2020     08/28/2020    CO2 26 08/28/2020    BUN 17 08/28/2020    CREATININE 1.0 08/28/2020    GFRAA >60 08/28/2020    LABGLOM >60 08/28/2020    GLUCOSE 87 08/28/2020    PROT 6.8 08/14/2020    LABALBU 3.3 08/28/2020    CALCIUM 10.0 08/28/2020    BILITOT 0.4 08/14/2020    ALKPHOS 98 08/14/2020    AST 11 08/14/2020    ALT <5 08/14/2020     Ct Abdomen Pelvis Wo Contrast Additional Contrast? None    Result Date: 8/16/2020  EXAMINATION: CT OF THE ABDOMEN AND PELVIS WITHOUT CONTRAST 8/14/2020 9:52 pm TECHNIQUE: CT of the abdomen and pelvis was performed without the administration of intravenous contrast. Multiplanar reformatted images are provided for review. Dose modulation, iterative reconstruction, and/or weight based adjustment of the mA/kV was utilized to reduce the radiation dose to as low as reasonably achievable. COMPARISON: None.  HISTORY: ORDERING SYSTEM PROVIDED HISTORY: nausea, vomiting, abdominal pain TECHNOLOGIST PROVIDED HISTORY: Reason for exam:->nausea, vomiting, abdominal pain Additional Contrast?->None Reason for Exam: nausea, vomiting, abdominal pain Acuity: Acute Type of Exam: Initial FINDINGS: Lower Chest: There is dependent consolidation in the lungs. Organs: There is mild right hydronephrosis. There is no obstructing stone. No acute abnormality of the liver, spleen, pancreas, adrenals, gallbladder, or left kidney. GI/Bowel: Bowel caliber is normal.  There is no evidence of active bowel inflammation. There is no evidence of acute appendicitis. Pelvis: In the right pelvic sidewall is a 56 x 67 x 66 mm (approximately) ill-defined mass adjacent to the iliac vessels. Borders of the mass are ill-defined. It medially displaces the right ureter. The mass appears somewhat elongated along the course of the iliac vein. The mass has an average density of 35 Hounsfield units. The urinary bladder is unremarkable. There are clips from prostatectomy. Peritoneum/Retroperitoneum: See above. No free air or free fluid. Bones/Soft Tissues: No acute osseous abnormality. Right pelvic mass. Differential diagnosis includes subacute hematoma or infiltrative malignancy. Mild right hydronephrosis due to compression of the ureter by the mass. Bibasilar pulmonary consolidation or atelectasis. Ct Head Wo Contrast    Result Date: 8/15/2020  EXAMINATION: CT OF THE HEAD WITHOUT CONTRAST 8/15/2020 6:09 pm TECHNIQUE: CT of the head was performed without the administration of intravenous contrast. Dose modulation, iterative reconstruction, and/or weight based adjustment of the mA/kV was utilized to reduce the radiation dose to as low as reasonably achievable. COMPARISON: Brain MRI 04/09/2018 HISTORY: ORDERING SYSTEM PROVIDED HISTORY: lethargic TECHNOLOGIST PROVIDED HISTORY: Reason for exam:->lethargic Has a \"code stroke\" or \"stroke alert\" been called? ->No Reason for Exam: lethargic Acuity: Acute Type of Exam: Initial FINDINGS: Patient motion in some areas, partially limiting evaluation of those areas. BRAIN/VENTRICLES:  No masses nor acute intracranial hemorrhage. Intact gray/white matter differentiation without findings of acute ischemia. No mass effect nor midline shift. Patent basilar cisterns and foramen magnum. No hydrocephalus. Age-appropriate mild to moderate diffuse atrophy. Mild to moderate subcortical, deep, and periventricular white matter hypodensities likely due to chronic small vessel ischemia with additional pontine involvement. ORBITS:  Bilateral lens implants. Otherwise normal without acute abnormality. SINUSES:  Normally pneumatized and aerated. SOFT TISSUES/SKULL:  No acute soft tissue abnormality. Moderate atherosclerotic calcifications. No acute fracture. 1. No acute intracranial abnormality. 2. Chronic changes as above. Assessment and Plan:   John Carson is a 79 y.o.  male  who presents with Pelvic mass    Pelvic abscess s/p IR assisted drainage  CT A/P with right pelvic mass with right hydronephrosis  CT urography with 5.5 x 4.7 cm collection in the right pelvis with midl peripheral enhancement extending into the right iliopsoas muscle as well as obstruction of the right ureter with hydroureteronephrosis. Also fluid filled thick walled structure extending from the cecum towards this collection raising suspicion for perforated appendicitis with associated abscess   Not sure about the origin ? Perforated appendicitis  Fluids appear to be purulent  Fluid cultures with GPC and Gram negative bacilli, Cytology pending  Blood cultures negative  Continue Meropenem   MRSA screening negative  General surgery recommendations appreciated  Urology recommendations appreciated  Discussed with Dr Lynn Kearney, plan to wait until the cytology report, if negative for malignancy can avoid surgery and can be discharged with oral antibiotics, needing 7 more days.   Has drain in right Yes

## 2021-07-12 ENCOUNTER — APPOINTMENT (OUTPATIENT)
Dept: GENERAL RADIOLOGY | Age: 81
End: 2021-07-12
Payer: MEDICARE

## 2021-07-12 ENCOUNTER — HOSPITAL ENCOUNTER (EMERGENCY)
Age: 81
Discharge: HOME | End: 2021-07-12
Payer: MEDICARE

## 2021-07-12 ENCOUNTER — APPOINTMENT (OUTPATIENT)
Dept: CT IMAGING | Age: 81
End: 2021-07-12
Payer: MEDICARE

## 2021-07-12 VITALS
DIASTOLIC BLOOD PRESSURE: 76 MMHG | SYSTOLIC BLOOD PRESSURE: 118 MMHG | TEMPERATURE: 97 F | HEIGHT: 72 IN | HEART RATE: 75 BPM | RESPIRATION RATE: 17 BRPM | OXYGEN SATURATION: 98 % | BODY MASS INDEX: 18.69 KG/M2 | WEIGHT: 138 LBS

## 2021-07-12 DIAGNOSIS — M54.6 ACUTE MIDLINE THORACIC BACK PAIN: ICD-10-CM
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
## 2021-07-12 DIAGNOSIS — S09.90XA INJURY OF HEAD, INITIAL ENCOUNTER: Primary | ICD-10-CM

## 2021-07-12 DIAGNOSIS — M54.50 ACUTE LEFT-SIDED LOW BACK PAIN WITHOUT SCIATICA: ICD-10-CM

## 2021-07-12 DIAGNOSIS — S16.1XXA STRAIN OF NECK MUSCLE, INITIAL ENCOUNTER: ICD-10-CM

## 2021-07-12 PROCEDURE — 6360000002 HC RX W HCPCS: Performed by: PHYSICIAN ASSISTANT

## 2021-07-12 PROCEDURE — 99285 EMERGENCY DEPT VISIT HI MDM: CPT

## 2021-07-12 PROCEDURE — 72072 X-RAY EXAM THORAC SPINE 3VWS: CPT

## 2021-07-12 PROCEDURE — 6370000000 HC RX 637 (ALT 250 FOR IP): Performed by: PHYSICIAN ASSISTANT

## 2021-07-12 PROCEDURE — 72100 X-RAY EXAM L-S SPINE 2/3 VWS: CPT

## 2021-07-12 PROCEDURE — 70450 CT HEAD/BRAIN W/O DYE: CPT

## 2021-07-12 PROCEDURE — 72125 CT NECK SPINE W/O DYE: CPT

## 2021-07-12 RX ORDER — CARBIDOPA, LEVODOPA AND ENTACAPONE 37.5; 200; 15 MG/1; MG/1; MG/1
1 TABLET, FILM COATED ORAL 4 TIMES DAILY
COMMUNITY

## 2021-07-12 RX ORDER — AMLODIPINE BESYLATE 2.5 MG/1
2.5 TABLET ORAL DAILY
COMMUNITY

## 2021-07-12 RX ORDER — CARBIDOPA AND LEVODOPA 50; 200 MG/1; MG/1
2 TABLET, EXTENDED RELEASE ORAL DAILY
COMMUNITY

## 2021-07-12 RX ORDER — HYDROMORPHONE HYDROCHLORIDE 2 MG/1
4 TABLET ORAL ONCE
Status: COMPLETED | OUTPATIENT
Start: 2021-07-12 | End: 2021-07-12

## 2021-07-12 RX ORDER — ROSUVASTATIN CALCIUM 20 MG/1
20 TABLET, COATED ORAL ONCE
Status: COMPLETED | OUTPATIENT
Start: 2021-07-12 | End: 2021-07-12

## 2021-07-12 RX ORDER — ACETAMINOPHEN 325 MG/1
650 TABLET ORAL EVERY 6 HOURS PRN
COMMUNITY

## 2021-07-12 RX ORDER — TAMSULOSIN HYDROCHLORIDE 0.4 MG/1
0.4 CAPSULE ORAL ONCE
Status: COMPLETED | OUTPATIENT
Start: 2021-07-12 | End: 2021-07-12

## 2021-07-12 RX ORDER — VITAMIN E 268 MG
400 CAPSULE ORAL DAILY
COMMUNITY

## 2021-07-12 RX ORDER — NICOTINE POLACRILEX 4 MG
15 LOZENGE BUCCAL PRN
COMMUNITY

## 2021-07-12 RX ORDER — DESIPRAMINE HYDROCHLORIDE 25 MG/1
50 TABLET ORAL NIGHTLY
COMMUNITY

## 2021-07-12 RX ORDER — ENTACAPONE 200 MG/1
200 TABLET ORAL ONCE
Status: COMPLETED | OUTPATIENT
Start: 2021-07-12 | End: 2021-07-12

## 2021-07-12 RX ORDER — DESIPRAMINE HYDROCHLORIDE 25 MG/1
50 TABLET ORAL ONCE
Status: COMPLETED | OUTPATIENT
Start: 2021-07-12 | End: 2021-07-12

## 2021-07-12 RX ORDER — DEXAMETHASONE 4 MG/1
4 TABLET ORAL ONCE
Status: COMPLETED | OUTPATIENT
Start: 2021-07-12 | End: 2021-07-12

## 2021-07-12 RX ORDER — RIVASTIGMINE TARTRATE 1.5 MG/1
1.5 CAPSULE ORAL ONCE
Status: COMPLETED | OUTPATIENT
Start: 2021-07-12 | End: 2021-07-12

## 2021-07-12 RX ORDER — HYDROMORPHONE HYDROCHLORIDE 4 MG/1
4 TABLET ORAL EVERY 4 HOURS PRN
COMMUNITY

## 2021-07-12 RX ORDER — DEXAMETHASONE 4 MG/1
4 TABLET ORAL 2 TIMES DAILY WITH MEALS
COMMUNITY

## 2021-07-12 RX ORDER — TAMSULOSIN HYDROCHLORIDE 0.4 MG/1
0.4 CAPSULE ORAL DAILY
COMMUNITY

## 2021-07-12 RX ORDER — BISACODYL 10 MG
10 SUPPOSITORY, RECTAL RECTAL DAILY PRN
COMMUNITY

## 2021-07-12 RX ORDER — SENNA PLUS 8.6 MG/1
1 TABLET ORAL DAILY
COMMUNITY

## 2021-07-12 RX ADMIN — DEXAMETHASONE 4 MG: 4 TABLET ORAL at 19:45

## 2021-07-12 RX ADMIN — TAMSULOSIN HYDROCHLORIDE 0.4 MG: 0.4 CAPSULE ORAL at 19:45

## 2021-07-12 RX ADMIN — ROSUVASTATIN CALCIUM 20 MG: 20 TABLET, FILM COATED ORAL at 20:34

## 2021-07-12 RX ADMIN — CARBIDOPA AND LEVODOPA 1.5 TABLET: 25; 100 TABLET ORAL at 20:34

## 2021-07-12 RX ADMIN — HYDROMORPHONE HYDROCHLORIDE 4 MG: 2 TABLET ORAL at 19:45

## 2021-07-12 RX ADMIN — DESIPRAMINE HYDROCHLORIDE 50 MG: 25 TABLET ORAL at 20:37

## 2021-07-12 RX ADMIN — RIVASTIGMINE TARTRATE 1.5 MG: 1.5 CAPSULE ORAL at 20:36

## 2021-07-12 RX ADMIN — ENTACAPONE 200 MG: 200 TABLET, FILM COATED ORAL at 20:34

## 2021-07-12 ASSESSMENT — ENCOUNTER SYMPTOMS
BACK PAIN: 1
NAUSEA: 0
RHINORRHEA: 0
SHORTNESS OF BREATH: 0
ABDOMINAL PAIN: 0
PHOTOPHOBIA: 0
VOMITING: 0

## 2021-07-12 ASSESSMENT — PAIN SCALES - GENERAL: PAINLEVEL_OUTOF10: 6

## 2021-07-12 ASSESSMENT — VISUAL ACUITY: OU: 1

## 2021-07-12 ASSESSMENT — PAIN DESCRIPTION - DESCRIPTORS: DESCRIPTORS: SHARP

## 2021-07-12 ASSESSMENT — PAIN DESCRIPTION - PAIN TYPE: TYPE: ACUTE PAIN

## 2021-07-12 ASSESSMENT — PAIN DESCRIPTION - LOCATION: LOCATION: NECK

## 2021-07-12 NOTE — ED TRIAGE NOTES
Pt in bed. Stephy reported that pt was in a nursing home and used his lift chair. PT fell onto his head and face from the lift chair. PT is not on blood thinners. PT stated that he has pain in his neck and throat. PT in no distress.

## 2021-07-12 NOTE — ED PROVIDER NOTES
[facility] EMERGENCY DEPT      CHIEF COMPLAINT       Chief Complaint   Patient presents with    Fall       Nurses Notes reviewed and I agree except as noted in the HPI. HISTORY OF PRESENT ILLNESS    Christy Solano is a 79 y.o. male who presents for injuries sustained in a fall. Patient is a nonambulatory resident of [facility]. Prior to arrival patient fell out of a lift chair. He fell face forward and admits to loss of consciousness. Patient informs me he hurts all over. Main complaints are head, neck, and back pain. Patient denies vision changes, nausea, vomiting, dyspnea, abdominal pain, or other complaints. REVIEW OF SYSTEMS     Review of Systems   Constitutional: Negative for chills and fever. HENT: Negative for nosebleeds and rhinorrhea. Eyes: Negative for photophobia and visual disturbance. Respiratory: Negative for shortness of breath. Cardiovascular: Negative for chest pain. Gastrointestinal: Negative for abdominal pain, nausea and vomiting. Genitourinary: Negative for difficulty urinating. Patient has indwelling Renee catheter   Musculoskeletal: Positive for back pain, gait problem (Patient does not ambulate at baseline), myalgias and neck pain. Skin: Positive for wound (Forehead abrasion). Negative for rash. Neurological: Positive for headaches. Negative for weakness and numbness. Psychiatric/Behavioral: Negative for confusion. PAST MEDICAL HISTORY    has a past medical history of SLIM (acute kidney injury) ([address] Anemia, Arthritis, Cancer ([address] Crohn's disease ([address] Dementia ([address] [place] (hard of hearing), Hx of blood clots, Hyperlipidemia, Hypotension, Metabolic encephalopathy, Parkinson disease ([address] Pelvic abscess in [facility]), Scleroderma ([address] Wears glasses, and Weight loss. SURGICAL HISTORY      has a past surgical history that includes Prostatectomy (2011); Colonoscopy (3/8/13); Colonoscopy (10/6/2015);  Colonoscopy (08/10/2018); Endoscopy, colon, diagnostic (10/6/2015); Colonoscopy (N/A, 9/17/2020); and CT NEEDLE BIOPSY LUNG PERCUTANEOUS (5/17/2021). CURRENT MEDICATIONS       Discharge Medication List as of 7/12/2021  6:37 PM      CONTINUE these medications which have NOT CHANGED    Details   carbidopa-levodopa (SINEMET)  MG per tablet Take 1 tablet by mouth nightlyHistorical Med      melatonin 3 MG TABS tablet Take 1 tablet by mouth nightlyHistorical Med      rivastigmine (EXELON) 4.5 MG capsule Take 6 mg by mouth 2 times daily Historical Med      polyethylene glycol (GLYCOLAX) powder Take 17 g by mouth daily Historical Med      vitamin D3 (CHOLECALCIFEROL) 25 MCG (1000 UT) TABS tablet Take 10,000 Units by mouth every morningHistorical Med      rosuvastatin (CRESTOR) 20 MG tablet Take 20 mg by mouth every morning Historical Med      escitalopram (LEXAPRO) 10 MG tablet Take 10 mg by mouth nightlyHistorical Med      Nutritional Supplements (ENSURE ORIGINAL PO) Take by mouth 2 times dailyHistorical Med      lidocaine (LIDODERM) 5 % Place 1 patch onto the skin every 24 hours 12 hours on, 12 hours off. Historical Med      VITAMIN E PO Take 1 capsule by mouth dailyHistorical Med      apixaban (ELIQUIS) 2.5 MG TABS tablet Take 1 tablet by mouth 2 times daily, Disp-60 tablet,R-0Normal      clopidogrel (PLAVIX) 75 MG tablet Take 1 tablet by mouth daily, Disp-30 tablet,R-0Normal      amLODIPine (NORVASC) 5 MG tablet Take 1 tablet by mouth daily, Disp-30 tablet,R-0Normal      bicalutamide (CASODEX) 50 MG chemo tablet Take 50 mg by mouth dailyHistorical Med      leuprolide (LUPRON DEPOT, 6-MONTH,) 45 MG injection Inject 45 mg into the muscle onceHistorical Med      amantadine (SYMMETREL) 100 MG capsule Take 100 mg by mouth 2 times dailyHistorical Med      carbidopa-levodopa-entacapone (STALEVO 200) -200 MG TABS per tablet Take 1 tablet by mouth 4 times daily 37.5-150-200 mg is current doseHistorical Med      vitamin B-12 (CYANOCOBALAMIN) 1000 MCG tablet Take 5,000 mcg by mouth 2 times daily              ALLERGIES     has No Known Allergies. FAMILY HISTORY     He indicated that his mother is . He indicated that his father is . family history includes Cancer in his father; Heart Disease in his father and mother. SOCIAL HISTORY    reports that he has never smoked. He has never used smokeless tobacco. He reports previous alcohol use. He reports that he does not use drugs. PHYSICAL EXAM     INITIAL VITALS:  height is 6' (1.829 m) and weight is 138 lb (62.6 kg). His axillary temperature is 97 °F (36.1 °C). His blood pressure is 118/76 and his pulse is 75. His respiration is 17 and oxygen saturation is 98%. Physical Exam  Vitals and nursing note reviewed. Constitutional:       General: He is not in acute distress. Appearance: He is well-developed. He is not toxic-appearing or diaphoretic. Interventions: Cervical collar in place. HENT:      Head: Normocephalic. Contusion present. No Hammond's sign. Jaw: There is normal jaw occlusion. Right Ear: Tympanic membrane, ear canal and external ear normal. No hemotympanum. Left Ear: Tympanic membrane, ear canal and external ear normal. No hemotympanum. Nose: Signs of injury present. No nasal deformity, septal deviation or rhinorrhea. Mouth/Throat:      Pharynx: Uvula midline. Eyes:      General: Lids are normal. Vision grossly intact. Gaze aligned appropriately. Extraocular Movements: Extraocular movements intact. Conjunctiva/sclera: Conjunctivae normal.      Pupils: Pupils are equal, round, and reactive to light. Comments: No periorbital trauma   Neck:      Trachea: Trachea normal. No tracheal deviation. Comments: Nonspecific tenderness  Cardiovascular:      Rate and Rhythm: Normal rate and regular rhythm. Heart sounds: Normal heart sounds. Pulmonary:      Effort: Pulmonary effort is normal. No respiratory distress. Breath sounds: Normal breath sounds. No decreased breath sounds or wheezing. Abdominal:      Palpations: Abdomen is soft. Tenderness: There is no abdominal tenderness. There is no guarding. Musculoskeletal:         General: Normal range of motion. Cervical back: Normal range of motion and neck supple. Tenderness present. No bony tenderness. Spinous process tenderness and muscular tenderness present. Thoracic back: Tenderness present. No bony tenderness. Lumbar back: Tenderness present. No bony tenderness. Comments: Good range of motion appreciated in upper extremities. Patient does not move lower extremities at baseline. There are no signs of trauma to the extremities. Patient endorses pain diffusely over his entire cervical, thoracic, lumbar spine. Lymphadenopathy:      Cervical: No cervical adenopathy. Skin:     General: Skin is warm and dry. Coloration: Skin is not pale. Findings: Abrasion (Forehead), bruising and ecchymosis present. No rash. Neurological:      General: No focal deficit present. Mental Status: He is alert. Mental status is at baseline. GCS: GCS eye subscore is 4. GCS verbal subscore is 5. GCS motor subscore is 6. Cranial Nerves: Cranial nerves are intact. No cranial nerve deficit. Sensory: Sensation is intact. No sensory deficit. Motor: Atrophy present. Coordination: Coordination is intact. Comments: Cranial nerves II-XII intact. Lower extremities are atrophic and weak. Patient does not move lower extremities. Psychiatric:         Mood and Affect: Mood normal.         Speech: Speech is delayed. Behavior: Behavior normal. Behavior is cooperative. Thought Content:  Thought content normal.         DIFFERENTIAL DIAGNOSIS:   Including but not limited to: Contusion, head injury, ICH, cervical strain, fracture    DIAGNOSTIC RESULTS     EKG: All EKG's are interpreted [facility] who either signs or Co-signs this chart in the absence of a cardiologist.  None    RADIOLOGY: non-plain film images(s) such as CT,Ultrasound and MRI are read by the radiologist.  Plain radiographic images are visualized and preliminarily interpreted by the emergency physician unless otherwise stated below. CT HEAD WO CONTRAST   Final Result      1. No acute intracranial hemorrhage, mass effect or midline shift. 2. Chronic senescent changes of the brain including generalized atrophy and chronic microvascular ischemic changes in the white matter. **This report has been created using voice recognition software. It may contain minor errors which are inherent in voice recognition technology. **      Final report electronically signed by Dr Oswald Solares on 7/12/2021 6:03 PM      CT CERVICAL SPINE WO CONTRAST   Final Result   Moderate degenerative changes with no acute fracture. **This report has been created using voice recognition software. It may contain minor errors which are inherent in voice recognition technology. **      Final report electronically signed by Dr Oswald Solares on 7/12/2021 6:12 PM      XR THORACIC SPINE (3 VIEWS)   Final Result   Degenerative changes. Osteopenia. Scoliosis. No fractures are identified. **This report has been created using voice recognition software. It may contain minor errors which are inherent in voice recognition technology. **      Final report electronically signed by Dr Oswald Solares on 7/12/2021 6:06 PM      XR LUMBAR SPINE (2-3 VIEWS)   Final Result   Degenerative changes. No fractures are identified. **This report has been created using voice recognition software. It may contain minor errors which are inherent in voice recognition technology. **      Final report electronically signed by Dr Oswald Solares on 7/12/2021 5:59 PM          LABS:   Labs Reviewed - No data to display    Ryan Solares [address] COURSE:   Vitals:    Vitals:    07/12/21 1648 07/12/21 1836 07/12/21 2047   BP: 100/76 111/70 118/76   Pulse: 80 71 75   Resp: 16  17   Temp: 97 °F (36.1 °C)     TempSrc: Axillary     SpO2: 97% 95% 98%   Weight: 138 lb (62.6 kg)     Height: 6' (1.829 m)       MDM:  The patient was seen and evaluated within the ED today for injuries sustained in a fall. On exam, I appreciated ecchymosis and abrasion to the forehead and nose. Patient expressed diffuse tenderness to palpate over the entire cervical, thoracic, and lumbar spine. No focal neurological deficits were noted. Old records were reviewed. Within the department, I observed the patient's vital signs to be within acceptable range. Radiological studies within the department revealed no acute osseous process. Within the department, the patient declined pain medication. I observed the patient's condition to modestly improve during the duration of their stay. I have considered ICH, cervical fracture and this patient's presentation was not consistent with such entities and therefore, no further testing was warranted. The patient and family were comfortable with the plan of discharge home and to follow up with Dr. Moon Bernal. Anticipatory guidance was given. The patient was instructed to return to the emergency department for any worsening of their symptoms. Patient was discharged from the emergency department in good condition with all questions answered. See disposition below. I have given the patient strict written and verbal instructions about care at home, follow-up, and signs and symptoms of worsening of condition and they did verbalize understanding. CRITICAL CARE:   None    CONSULTS:  None    PROCEDURES:  None    FINAL IMPRESSION      1. Injury of head, initial encounter    2. Fall, initial encounter    3. Strain of neck muscle, initial encounter    4. Acute midline thoracic back pain    5.  Acute left-sided low back pain without sciatica DISPOSITION/PLAN     1. Injury of head, initial encounter    2. Fall, initial encounter    3. Strain of neck muscle, initial encounter    4. Acute midline thoracic back pain    5.  Acute left-sided low back pain without sciatica        PATIENT REFERRED TO:  Rito Purcell MD  [address] [place] [address] 994  [address]  [phone]    Schedule an appointment as soon as possible for a visit         DISCHARGE MEDICATIONS:  Discharge Medication List as of 7/12/2021  6:37 PM          (Please note that portions of this note were completed with a voice recognition program.  Efforts were made to edit the dictations but occasionally words are mis-transcribed.)    Lynda Aguiar PA-C 07/17/21 4:20 AM    KD Smatr PA-C  07/17/21 0423

## 2021-07-12 NOTE — ED NOTES
Pt resting in bed with side rails up 2x2 and call light in reach. Vital signs assessed and stable. Pt updated on plan of care. Pt voiced understanding. Pt verbalized no other needs or concerns at this time.         Law Stephenson RN  07/12/21 9434

## 2022-01-02 NOTE — CONSULTS
- chronic, stable  - continue current home meds  - monitor BP trend  - adjust meds as needed      SURGICAL CONSULTATION    Date of Admission:  8/14/2020  Date of Consultation:  8/15/2020    PCP:  Hilario Taylor MD  Thank you Dr. Vanessa Caro MD very much for your consultation, it's always appreciated. I had the privilege today to see your patient Nam Jones. Chief Complaint / History of Present Illness:  Nam Jones is a very pleasant 79 y.o. male who presents with pain in the diffuse lower abdomen and is acute, worsening and dull compared to patient's normal condition. It is moderate in intensity for a duration of 2 days and is continuous with modifying factors increased by or worse with pressure, had constipation and urinary retention. .  CT revealed a pelvic mass? Hematoma vs neoplasm? Hospitalist eval:  \"--- Pelvic mass  --- Mild right-sided hydronephrosis due to mass  Concerning for possible malignancy especially given history of prostate cancer. Subacute hematoma in the differential but less likely. Colon cancer less likely given that he had colonoscopy in 2018 that just showed polyps. General surgery consulted by ED. Will see patient in the morning. Patient most likely will need tissue biopsy to confirm diagnosis of the mass. Will also ask urology to see him. He follows Dr. Celine Monsivais in the clinic. Will check PSA    PMH:   has a past medical history of Arthritis, Cancer ([address] Hyperlipidemia, and Scleroderma ([address] PSH:   has a past surgical history that includes Prostatectomy (2011); Endoscopy, colon, diagnostic (10/6/2015); Colonoscopy (3/8/13); Colonoscopy (10/6/2015); and Colonoscopy (08/10/2018). Allergies:  No Known Allergies     Home Meds:    Prior to Admission medications    Medication Sig Start Date End Date Taking?  Authorizing Provider   rivastigmine (EXELON) 1.5 MG capsule Take 1.5 mg by mouth 2 times daily    Historical Provider, MD   polyethylene glycol (GLYCOLAX) powder Take 17 g by mouth daily as needed    Historical Provider, MD   Cholecalciferol (VITAMIN D3) 5000 units TABS Take 10,000 Units by mouth every morning    Historical Provider, MD   leuprolide (LUPRON DEPOT, 6-MONTH,) 45 MG injection Inject 45 mg into the muscle once    Historical Provider, MD   amantadine (SYMMETREL) 100 MG capsule Take 100 mg by mouth 2 times daily    Historical Provider, MD   ROSUVASTATIN CALCIUM PO Take 40 mg by mouth every morning    Historical Provider, MD   escitalopram (LEXAPRO) 10 MG tablet Take 10 mg by mouth nightly    Historical Provider, MD   carbidopa-levodopa-entacapone (STALEVO 200) -200 MG TABS per tablet Take 1 tablet by mouth 4 times daily    Historical Provider, MD   aspirin 81 MG tablet Take 81 mg by mouth daily. Historical Provider, MD   vitamin B-12 (CYANOCOBALAMIN) 1000 MCG tablet Take 5,000 mcg by mouth 2 times daily     Historical Provider, MD        Hospital Meds:    Current Facility-Administered Medications   Medication Dose Route Frequency Provider Last Rate Last Dose    ondansetron (ZOFRAN) injection 4 mg  4 mg Intravenous Q30 Min PRN Yolis Po, MD         Current Outpatient Medications   Medication Sig Dispense Refill    rivastigmine (EXELON) 1.5 MG capsule Take 1.5 mg by mouth 2 times daily      polyethylene glycol (GLYCOLAX) powder Take 17 g by mouth daily as needed      Cholecalciferol (VITAMIN D3) 5000 units TABS Take 10,000 Units by mouth every morning      leuprolide (LUPRON DEPOT, 6-MONTH,) 45 MG injection Inject 45 mg into the muscle once      amantadine (SYMMETREL) 100 MG capsule Take 100 mg by mouth 2 times daily      ROSUVASTATIN CALCIUM PO Take 40 mg by mouth every morning      escitalopram (LEXAPRO) 10 MG tablet Take 10 mg by mouth nightly      carbidopa-levodopa-entacapone (STALEVO 200) -200 MG TABS per tablet Take 1 tablet by mouth 4 times daily      aspirin 81 MG tablet Take 81 mg by mouth daily.       vitamin B-12 (CYANOCOBALAMIN) 1000 MCG tablet Take 5,000 mcg by mouth 2 times daily          Social History / Family History:     Social History     Socioeconomic History    Marital status:      Spouse name: None    Number of children: None    Years of education: None    Highest education level: None   Occupational History    None   Social Needs    Financial resource strain: None    Food insecurity     Worry: None     Inability: None    Transportation needs     Medical: None     Non-medical: None   Tobacco Use    Smoking status: Never Smoker    Smokeless tobacco: Never Used   Substance and Sexual Activity    Alcohol use: Yes     Comment: rarely    Drug use: No    Sexual activity: None   Lifestyle    Physical activity     Days per week: None     Minutes per session: None    Stress: None   Relationships    Social connections     Talks on phone: None     Gets together: None     Attends [employer] service: None     Active member of club or organization: None     Attends meetings of clubs or organizations: None     Relationship status: None    Intimate partner violence     Fear of current or ex partner: None     Emotionally abused: None     Physically abused: None     Forced sexual activity: None   Other Topics Concern    None   Social History Narrative    None    History reviewed. No pertinent family history. otherwise irrelevant to this surgical issue. Review of Systems:  Constitutional: Negative for fever, chills, diaphoresis, appetite change and fatigue. HENT: Negative for sore throat, trouble swallowing and voice change. Respiratory: Negative for cough, positive for shortness of breath no wheezing. Cardiovascular: Negative for chest pain positive for SOB with one flight of stairs exertion, no pitting LE edema. Gastrointestinal: positive for abdominal pain, and constipation and urinary retention, Negative for nausea, vomiting, abdominal distention, no diarrhea, blood in stool, anal bleeding or rectal pain. Musculoskeletal: Negative for joint swelling and arthralgias.    Skin: Warm and dry, well NEGATIVE NEGATIVE    Leukocyte Esterase, Urine NEGATIVE NEGATIVE    RBC, UA 4 (H) 0 - 3 /HPF    WBC, UA 4 (H) 0 - 2 /HPF    Bacteria, UA NEGATIVE NEGATIVE /HPF    Trans Epithel, UA <1 /HPF    Mucus, UA RARE (A) NEGATIVE HPF    Trichomonas, UA NONE SEEN NONE SEEN /HPF       Diagnosis:  Patient Active Problem List   Diagnosis    Weight loss, unintentional    History of colon polyps    Pelvic mass       Assessment & plan:  Ezekiel Roy is a very pleasant 79 y.o. male presenting with a pelvic mass. Pelvic mass? Fluid / hematoma? Noted, on CT percutaneous biopsy I recommend first, and then I'll manage according to its result. Thank you doctor Ebony Cristobal MD very much for your consultation and for the opportunity to take care of Mr Ezekiel Roy with you, I'll follow along with you and I'll update you on any new events in his care.    ____________________________________________    Angie Haywood MD, FACS, FICS    8/15/2020  1:46 AM      Patient was seen with total face to face time of 45 minutes. More than 50% of this visit was counseling and education as above in my assessment and plan section of my note.

## 2023-02-16 NOTE — TELEPHONE ENCOUNTER
Called [facility], spoke with Janessa Parkinson notified of message per Dr. Diaz and she verbally understood.    Pt notified

## 2024-10-22 NOTE — PROGRESS NOTES
[id number] [place] OF SPEECH/LANGUAGE PATHOLOGY  DAILY PROGRESS NOTE  Sloan Farias  8/26/2020  [id number]  Pelvic mass [R19.00]  Lower abdominal pain [R10.30]  SLIM (acute kidney injury) ([address] [N17.9]  Other hydronephrosis [N13.39]  Constipation, unspecified constipation type [K59.00]  No Known Allergies      Pt was seen this date for dysphagia treatment. IMPRESSION AND RECOMMENDATIONS:   Sloan Farias was seen for a bedside swallowing treatment and diet tolerance monitoring. Pt was alert and cooperative throughout assessment. He was positioned upright in bed and accepted PO trials of puree, soft solids and thin liquids by cup/straw sips. Prolonged mastication, timely oral A-P transit and minimal lingual residue was observed with trials of soft solids. Pharyngeal swallow appeared delayed with reduced laryngeal elevation. Clear vocal quality and 0 overt s/s of aspiration were observed with all PO trials given. Recommend continue dysphagia 2 diet/thin liquids with strict aspiration precautions. Pills should be given crushed in puree. ST will continue to follow Sloan Farias for diet tolerance monitoring and possible diet level advancement. GOALS (current status in bold):  Short-term Goals  Timeframe for Short-term Goals: length of admission  Goal 1: Pt will tolerate dysphagia II diet/thin liquids with adequate oral manipulation/clearance and no s/s aspiration. Goal partially met- pt reported choking episode with thin liquids by straw sips earlier this AM.  Discussed importance of upright positioning and small bites and sips  Goal 2: Pt will perform effortful swallow exercise x10 given min cues for improved bolus efficiency/pharyngeal clearance. Ongoing- ST encouraged pt to utilize \"hard swallow\" with PO intake  Goal 3: Pt/caregivers will indicate understanding of all recommendations.  Goal being met, continue         EDUCATION: recommendations/POC    PAIN RATING (0-10 Scale): pain reported- nursing made aware  Time in/Time out: SLP Individual Minutes  Time In: 0940  Time Out: 1000  Minutes:  20    Visit number: [address], [place], [facility], 8/26/2020 Detail Level: Zone Initiate Treatment: [place] 0.15 %-3.1 %-1.2 % topical gel \\n\\nSig: Apply to the aa of the face and neck once a day at night\\n\\n\\nPlexion 9.8 %-4.8 % topical cleanser QD-BID\\n\\nSig: Wash face twice a day. Render In Strict Bullet Format?: No Initiate Treatment: [place] 0.15 %-3.1 %-1.2 % topical gel \\n\\nSig: Apply to the aa of the face and neck once a day at night

## (undated) DEVICE — Z DISCONTINUED NO SUB IDED TUBING ETCO2 AD L6.5FT NSL ORAL CVD PRNG NONFLARED TIP OVR